# Patient Record
Sex: FEMALE | Race: WHITE | NOT HISPANIC OR LATINO | Employment: FULL TIME | URBAN - METROPOLITAN AREA
[De-identification: names, ages, dates, MRNs, and addresses within clinical notes are randomized per-mention and may not be internally consistent; named-entity substitution may affect disease eponyms.]

---

## 2017-08-17 RX ORDER — ESTRADIOL AND NORETHINDRONE ACETATE 1; .5 MG/1; MG/1
TABLET ORAL
Qty: 84 TABLET | Refills: 0 | Status: SHIPPED | OUTPATIENT
Start: 2017-08-17 | End: 2017-08-23 | Stop reason: SDUPTHER

## 2017-08-17 NOTE — TELEPHONE ENCOUNTER
----- Message from Rudy Ruff sent at 8/17/2017  9:56 AM CDT -----  Contact: pt  x_ 1st Request  _ 2nd Request  _ 3rd Request    Who: pt    Why: requesting hormone RX to be refilled and called in to her nearest [facility]: [phone] and is needing a g order     What Number to Call Back: [phone]     When to Expect a call back: (Before the end of the day)  -- if call after 3:00 call back will be tomorrow.

## 2017-08-23 ENCOUNTER — HOSPITAL ENCOUNTER (OUTPATIENT)
Dept: RADIOLOGY | Facility: HOSPITAL | Age: 57
Discharge: HOME | End: 2017-08-23
Attending: OBSTETRICS & GYNECOLOGY
Payer: COMMERCIAL

## 2017-08-23 ENCOUNTER — OFFICE VISIT (OUTPATIENT)
Dept: OBSTETRICS AND GYNECOLOGY | Facility: CLINIC | Age: 57
End: 2017-08-23
Attending: OBSTETRICS & GYNECOLOGY
Payer: COMMERCIAL

## 2017-08-23 VITALS
DIASTOLIC BLOOD PRESSURE: 72 MMHG | BODY MASS INDEX: 33.79 KG/M2 | SYSTOLIC BLOOD PRESSURE: 120 MMHG | WEIGHT: 183.63 LBS | HEIGHT: 62 IN

## 2017-08-23 DIAGNOSIS — Z01.419 WELL WOMAN EXAM WITH ROUTINE GYNECOLOGICAL EXAM: ICD-10-CM

## 2017-08-23 DIAGNOSIS — Z01.419 WELL WOMAN EXAM WITH ROUTINE GYNECOLOGICAL EXAM: Primary | ICD-10-CM

## 2017-08-23 DIAGNOSIS — Z79.890 HORMONE REPLACEMENT THERAPY: ICD-10-CM

## 2017-08-23 DIAGNOSIS — Z12.31 VISIT FOR SCREENING MAMMOGRAM: ICD-10-CM

## 2017-08-23 PROCEDURE — 77067 SCR MAMMO BI INCL CAD: CPT | Mod: TC

## 2017-08-23 PROCEDURE — 99396 PREV VISIT EST AGE 40-64: CPT | Mod: S$GLB,,, | Performed by: OBSTETRICS & GYNECOLOGY

## 2017-08-23 PROCEDURE — 88175 CYTOPATH C/V AUTO FLUID REDO: CPT

## 2017-08-23 PROCEDURE — 77063 BREAST TOMOSYNTHESIS BI: CPT | Mod: 26,,, | Performed by: RADIOLOGY

## 2017-08-23 PROCEDURE — 99999 PR PBB SHADOW E&M-EST. PATIENT-LVL III: CPT | Mod: PBBFAC,,, | Performed by: OBSTETRICS & GYNECOLOGY

## 2017-08-23 PROCEDURE — 77067 SCR MAMMO BI INCL CAD: CPT | Mod: 26,,, | Performed by: RADIOLOGY

## 2017-08-23 RX ORDER — ESTRADIOL AND NORETHINDRONE ACETATE 1; .5 MG/1; MG/1
TABLET ORAL
Qty: 84 TABLET | Refills: 4 | Status: SHIPPED | OUTPATIENT
Start: 2017-08-23 | End: 2019-03-21 | Stop reason: ALTCHOICE

## 2017-08-23 NOTE — PROGRESS NOTES
SUBJECTIVE:   57 y.o. female   for annual routine Pap and checkup. No LMP recorded. Patient is postmenopausal..      History reviewed. No pertinent past medical history.  Past Surgical History:   Procedure Laterality Date     SECTION      x2     Social History     Social History    Marital status:      Spouse name: N/A    Number of children: N/A    Years of education: N/A     Occupational History    Not on file.     Social History Main Topics    Smoking status: Former Smoker     Quit date: 2013    Smokeless tobacco: Never Used    Alcohol use Yes      Comment: occasionally    Drug use: No    Sexual activity: Not Currently     Other Topics Concern    Are You Pregnant Or Think You May Be? No     Social History Narrative    No narrative on file     Family History   Problem Relation Age of Onset    Breast cancer Paternal Aunt     Melanoma Neg Hx      OB History    Para Term  AB Living   2 2       2   SAB TAB Ectopic Multiple Live Births                  # Outcome Date GA Lbr Andreas/2nd Weight Sex Delivery Anes PTL Lv   2 Para      CS-Unspec  N    1 Para      CS-Unspec  N           Current Outpatient Prescriptions   Medication Sig Dispense Refill    AMPHETAMINE SALT COMBO 20 MG tablet   0    estradiol-norethindrone (ACTIVELLA) 1-0.5 mg per tablet Take 1 tablet by mouth  daily 84 tablet 4    hydrochlorothiazide (HYDRODIURIL) 25 MG tablet       lovastatin (MEVACOR) 10 MG tablet       MULTIVITAMIN ORAL Take by mouth.      tretinoin (RETIN-A) 0.025 % cream Apply topically nightly. 45 g 3     No current facility-administered medications for this visit.      Allergies: Review of patient's allergies indicates no known allergies.     CHIEF COMPLAINT: She has no unusual complaints.   Annual visit Yes      ROS:  Constitutional: no weight loss, weight gain, fever, fatigue  Eyes:  No vision changes, glasses/contacts  ENT/Mouth: No ulcers, sinus problems, ears ringing,  "headache  Cardiovascular: No inability to lie flat, chest pain, exercise intolerance, swelling, heart palpitations  Respiratory: No wheezing, coughing blood, shortness of breath, or cough  Gastrointestinal: No diarrhea, bloody stool, nausea/vomiting, constipation, gas, hemorrhoids  Genitourinary: No blood in urine, painful urination, urgency of urination, frequency of urination, incomplete emptying, incontinence, abnormal bleeding, painful periods, heavy periods, vaginal discharge, vaginal odor, painful intercourse, sexual problems, bleeding after intercourse.  Musculoskeletal: No muscle weakness  Skin/Breast: No painful breasts, nipple discharge, masses, rash, ulcers  Neurological: No passing out, seizures, numbness, headache  Endocrine: No diabetes, hypothyroid, hyperthyroid, hot flashes, hair loss, abnormal hair growth, ance  Psychiatric: No depression, crying  Hematologic: No bruises, bleeding, swollen lymph nodes, anemia.      OBJECTIVE:   The patient appears well, alert, oriented x 3, in no distress.  /72   Ht 5' 2" (1.575 m)   Wt 83.3 kg (183 lb 10.3 oz)   BMI 33.59 kg/m²   NECK: no thyromegaly, trachea midline  SKIN: no acne, striae, hirsutism  BREAST EXAM: breasts appear normal, no suspicious masses, no skin or nipple changes or axillary nodes  ABDOMEN: no hernias, masses, or hepatosplenomegaly  GENITALIA: normal external genitalia, no erythema, no discharge  URETHRA: normal urethra, normal urethral meatus  VAGINA: Normal  CERVIX: no lesions or cervical motion tenderness  UTERUS: normal size, contour, position, consistency, mobility, non-tender  ADNEXA: no mass, fullness, tenderness      ASSESSMENT:   1. Well woman exam with routine gynecological exam    2. Hormone replacement therapy        PLAN:   mammogram  pap smear  return annually or prn  refilled hrt   "

## 2017-08-24 ENCOUNTER — PATIENT MESSAGE (OUTPATIENT)
Dept: OBSTETRICS AND GYNECOLOGY | Facility: CLINIC | Age: 57
End: 2017-08-24

## 2017-08-27 ENCOUNTER — PATIENT MESSAGE (OUTPATIENT)
Dept: OBSTETRICS AND GYNECOLOGY | Facility: CLINIC | Age: 57
End: 2017-08-27

## 2018-09-04 ENCOUNTER — PATIENT MESSAGE (OUTPATIENT)
Dept: OBSTETRICS AND GYNECOLOGY | Facility: CLINIC | Age: 58
End: 2018-09-04

## 2018-09-26 ENCOUNTER — OFFICE VISIT (OUTPATIENT)
Dept: OBSTETRICS AND GYNECOLOGY | Facility: CLINIC | Age: 58
End: 2018-09-26
Payer: COMMERCIAL

## 2018-09-26 ENCOUNTER — HOSPITAL ENCOUNTER (OUTPATIENT)
Dept: RADIOLOGY | Facility: HOSPITAL | Age: 58
Discharge: HOME | End: 2018-09-26
Attending: OBSTETRICS & GYNECOLOGY
Payer: COMMERCIAL

## 2018-09-26 VITALS
DIASTOLIC BLOOD PRESSURE: 80 MMHG | WEIGHT: 185.88 LBS | HEIGHT: 62 IN | SYSTOLIC BLOOD PRESSURE: 132 MMHG | BODY MASS INDEX: 34.2 KG/M2

## 2018-09-26 DIAGNOSIS — Z79.890 HORMONE REPLACEMENT THERAPY (HRT): ICD-10-CM

## 2018-09-26 DIAGNOSIS — R53.83 FATIGUE, UNSPECIFIED TYPE: ICD-10-CM

## 2018-09-26 DIAGNOSIS — Z12.31 VISIT FOR SCREENING MAMMOGRAM: ICD-10-CM

## 2018-09-26 DIAGNOSIS — Z78.0 MENOPAUSE: ICD-10-CM

## 2018-09-26 DIAGNOSIS — Z01.419 WELL WOMAN EXAM WITH ROUTINE GYNECOLOGICAL EXAM: Primary | ICD-10-CM

## 2018-09-26 DIAGNOSIS — Z12.31 SCREENING MAMMOGRAM, ENCOUNTER FOR: ICD-10-CM

## 2018-09-26 DIAGNOSIS — Z12.31 VISIT FOR SCREENING MAMMOGRAM: Primary | ICD-10-CM

## 2018-09-26 DIAGNOSIS — R32 URINARY INCONTINENCE, UNSPECIFIED TYPE: ICD-10-CM

## 2018-09-26 PROCEDURE — 99396 PREV VISIT EST AGE 40-64: CPT | Mod: S$GLB,,, | Performed by: OBSTETRICS & GYNECOLOGY

## 2018-09-26 PROCEDURE — 77063 BREAST TOMOSYNTHESIS BI: CPT | Mod: 26,,, | Performed by: RADIOLOGY

## 2018-09-26 PROCEDURE — 77067 SCR MAMMO BI INCL CAD: CPT | Mod: TC

## 2018-09-26 PROCEDURE — 99999 PR PBB SHADOW E&M-EST. PATIENT-LVL III: CPT | Mod: PBBFAC,,, | Performed by: OBSTETRICS & GYNECOLOGY

## 2018-09-26 PROCEDURE — 77067 SCR MAMMO BI INCL CAD: CPT | Mod: 26,,, | Performed by: RADIOLOGY

## 2018-09-26 RX ORDER — ESTERIFIED ESTROGEN AND METHYLTESTOSTERONE 1.25; 2.5 MG/1; MG/1
1 TABLET ORAL DAILY
Qty: 30 TABLET | Refills: 5 | Status: SHIPPED | OUTPATIENT
Start: 2018-09-26 | End: 2019-02-05

## 2018-09-26 NOTE — PROGRESS NOTES
Subjective:       Patient ID: Rogelio Davila is a 58 y.o. female.    Chief Complaint:  Well Woman      History of Present Illness  HPI  This 58 yr old P4 female is here for routine gyn exam.  She is a patient of Dr Adamson and had normal pap and mammogram with low risk last yr.  She is taking activella and still having foggy thinking , hair loss , weight gain, bladder changes where she cannot hold it sometimes.  Inflammation all over and is on fluid pill every other day.  Most important is hair loss and general sense of being.  She is sleeping and no hot flashes.  She is not currently sexually active but paps are painful.  Pt is here for routine exam but wants to optimize her HRT.  We had a long discussion on risks and benefits and pt understands  GYN & OB History  No LMP recorded. Patient is postmenopausal.   Date of Last Pap: 2017    OB History    Para Term  AB Living   4 4 2     2   SAB TAB Ectopic Multiple Live Births                  # Outcome Date GA Lbr Andreas/2nd Weight Sex Delivery Anes PTL Lv   4 Term            3 Term            2 Para      CS-Unspec  N    1 Para      CS-Unspec  N           Review of Systems  Review of Systems   Constitutional: Positive for fatigue and unexpected weight change. Negative for chills and fever.   Respiratory: Negative for shortness of breath.    Cardiovascular: Negative for chest pain.   Gastrointestinal: Negative for abdominal pain, nausea and vomiting.   Genitourinary: Negative for difficulty urinating, dyspareunia, genital sores, menstrual problem, pelvic pain, vaginal bleeding, vaginal discharge and vaginal pain.   Skin: Negative for wound.   Hematological: Negative for adenopathy.           Objective:   Physical Exam:   Constitutional: She is oriented to person, place, and time. She appears well-developed and well-nourished.    HENT:   Head: Normocephalic.    Eyes: EOM are normal.    Neck: Normal range of motion.    Cardiovascular: Normal rate.      Pulmonary/Chest: Effort normal. She exhibits no mass and no tenderness. Right breast exhibits no inverted nipple, no mass, no skin change and no tenderness. Left breast exhibits no inverted nipple, no mass, no skin change and no tenderness.        Abdominal: Soft. She exhibits no distension. There is no tenderness.     Genitourinary: Vagina normal and uterus normal. There is no rash, tenderness or lesion on the right labia. There is no rash, tenderness or lesion on the left labia. Uterus is not tender. Cervix is normal. Right adnexum displays no mass, no tenderness and no fullness. Left adnexum displays no mass, no tenderness and no fullness. Cervix exhibits no discharge.           Musculoskeletal: Normal range of motion.       Neurological: She is alert and oriented to person, place, and time.    Skin: Skin is warm and dry.    Psychiatric: She has a normal mood and affect.          Assessment:        1. Well woman exam with routine gynecological exam    2. Menopause    3. Fatigue, unspecified type    4. Urinary incontinence, unspecified type    5. Screening mammogram, encounter for    6. Hormone replacement therapy (HRT)               Plan:       Will start with estratest fs and prometrium from patio 200 mg  Labs today to see where she is on current HRT  Refer to urogyn  Follow up in 3 months  Pap every 3 yrs  mammogrma yrly

## 2018-10-01 ENCOUNTER — PATIENT MESSAGE (OUTPATIENT)
Dept: OBSTETRICS AND GYNECOLOGY | Facility: CLINIC | Age: 58
End: 2018-10-01

## 2018-10-01 DIAGNOSIS — Z78.0 MENOPAUSE: ICD-10-CM

## 2018-10-02 ENCOUNTER — TELEPHONE (OUTPATIENT)
Dept: RADIOLOGY | Facility: HOSPITAL | Age: 58
End: 2018-10-02

## 2018-10-03 ENCOUNTER — HOSPITAL ENCOUNTER (OUTPATIENT)
Dept: RADIOLOGY | Facility: HOSPITAL | Age: 58
Discharge: HOME | End: 2018-10-03
Attending: OBSTETRICS & GYNECOLOGY
Payer: COMMERCIAL

## 2018-10-03 DIAGNOSIS — R92.8 ABNORMAL MAMMOGRAM: ICD-10-CM

## 2018-10-03 PROCEDURE — 77065 DX MAMMO INCL CAD UNI: CPT | Mod: TC,PO

## 2018-10-03 PROCEDURE — 77065 DX MAMMO INCL CAD UNI: CPT | Mod: 26,,, | Performed by: RADIOLOGY

## 2018-10-03 PROCEDURE — 76642 ULTRASOUND BREAST LIMITED: CPT | Mod: 26,RT,, | Performed by: RADIOLOGY

## 2018-10-03 PROCEDURE — 77061 BREAST TOMOSYNTHESIS UNI: CPT | Mod: TC,PO

## 2018-10-03 PROCEDURE — 76642 ULTRASOUND BREAST LIMITED: CPT | Mod: TC,PO,RT

## 2018-10-03 PROCEDURE — 77061 BREAST TOMOSYNTHESIS UNI: CPT | Mod: 26,,, | Performed by: RADIOLOGY

## 2018-10-10 ENCOUNTER — PATIENT MESSAGE (OUTPATIENT)
Dept: OBSTETRICS AND GYNECOLOGY | Facility: CLINIC | Age: 58
End: 2018-10-10

## 2019-02-01 ENCOUNTER — PATIENT MESSAGE (OUTPATIENT)
Dept: OBSTETRICS AND GYNECOLOGY | Facility: CLINIC | Age: 59
End: 2019-02-01

## 2019-02-01 DIAGNOSIS — Z78.0 MENOPAUSE: Primary | ICD-10-CM

## 2019-02-05 ENCOUNTER — PATIENT MESSAGE (OUTPATIENT)
Dept: OBSTETRICS AND GYNECOLOGY | Facility: CLINIC | Age: 59
End: 2019-02-05

## 2019-02-05 RX ORDER — ESTERIFIED ESTROGEN AND METHYLTESTOSTERONE .625; 1.25 MG/1; MG/1
1 TABLET ORAL DAILY
Qty: 30 TABLET | Refills: 5 | Status: SHIPPED | OUTPATIENT
Start: 2019-02-05 | End: 2019-08-13 | Stop reason: SDUPTHER

## 2019-02-13 ENCOUNTER — PATIENT MESSAGE (OUTPATIENT)
Dept: OBSTETRICS AND GYNECOLOGY | Facility: CLINIC | Age: 59
End: 2019-02-13

## 2019-02-18 DIAGNOSIS — Z78.0 MENOPAUSE: ICD-10-CM

## 2019-03-21 ENCOUNTER — OFFICE VISIT (OUTPATIENT)
Dept: OBSTETRICS AND GYNECOLOGY | Facility: CLINIC | Age: 59
End: 2019-03-21
Attending: OBSTETRICS & GYNECOLOGY
Payer: COMMERCIAL

## 2019-03-21 VITALS
SYSTOLIC BLOOD PRESSURE: 116 MMHG | HEIGHT: 62 IN | WEIGHT: 195.13 LBS | BODY MASS INDEX: 35.91 KG/M2 | DIASTOLIC BLOOD PRESSURE: 72 MMHG

## 2019-03-21 DIAGNOSIS — Z79.890 HORMONE REPLACEMENT THERAPY (HRT): ICD-10-CM

## 2019-03-21 DIAGNOSIS — Z78.0 MENOPAUSE: Primary | ICD-10-CM

## 2019-03-21 PROCEDURE — 99213 OFFICE O/P EST LOW 20 MIN: CPT | Mod: S$GLB,,, | Performed by: OBSTETRICS & GYNECOLOGY

## 2019-03-21 PROCEDURE — 3008F BODY MASS INDEX DOCD: CPT | Mod: CPTII,S$GLB,, | Performed by: OBSTETRICS & GYNECOLOGY

## 2019-03-21 PROCEDURE — 3008F PR BODY MASS INDEX (BMI) DOCUMENTED: ICD-10-PCS | Mod: CPTII,S$GLB,, | Performed by: OBSTETRICS & GYNECOLOGY

## 2019-03-21 PROCEDURE — 99213 PR OFFICE/OUTPT VISIT, EST, LEVL III, 20-29 MIN: ICD-10-PCS | Mod: S$GLB,,, | Performed by: OBSTETRICS & GYNECOLOGY

## 2019-03-21 NOTE — PROGRESS NOTES
Subjective:       Patient ID: Rogelio Davila is a 58 y.o. female.    Chief Complaint:  No chief complaint on file.      History of Present Illness  HPI  This 58 yr old P4 female is here to follow up changing from activella to estratest HS and prometrium 200 from patio.  She started this in Sept. She felt great for some time then had depression with holidays as usual and usually does come out of this on her own but did not.  Was groggy and depressed longer so we changed to estratest HS and decreased the dose of progesterone to 100.   She had ultrasound after her mammogram and back to yearly mammograms.  Pap normal 2017 with Dr Tirado.  She has gained about 10 lbs with the depression and holidays.   She is not taking any meds for depression.   and sister  16 yrs ago and comes and goes but usually can snap out of it.    She walks 30 min at work and we discussed and will start back with yoga and walking.  Has access to gym  Hair still thinning but just like her grandmother.    GYN & OB History  No LMP recorded. Patient is postmenopausal.   Date of Last Pap: 2017    OB History    Para Term  AB Living   4 4 2     2   SAB TAB Ectopic Multiple Live Births                  # Outcome Date GA Lbr Andreas/2nd Weight Sex Delivery Anes PTL Lv   4 Term            3 Term            2 Para      CS-Unspec  N    1 Para      CS-Unspec  N           Review of Systems  Review of Systems   Constitutional: Negative for chills and fever.   Respiratory: Negative for shortness of breath.    Cardiovascular: Negative for chest pain.   Gastrointestinal: Negative for abdominal pain, nausea and vomiting.   Genitourinary: Negative for difficulty urinating, dyspareunia, genital sores, menstrual problem, pelvic pain, vaginal bleeding, vaginal discharge and vaginal pain.   Skin: Negative for wound.   Hematological: Negative for adenopathy.           Objective:   Physical Exam       Assessment:        1. Menopause    2.  Hormone replacement therapy (HRT)               Plan:      As above try exercise and diet and nioxin products for hair  Will continue the above but pt may use the full strength and can go up if desires but keep the progesterone the same.

## 2019-03-28 ENCOUNTER — OFFICE VISIT (OUTPATIENT)
Dept: DERMATOLOGY | Facility: CLINIC | Age: 59
End: 2019-03-28
Payer: COMMERCIAL

## 2019-03-28 DIAGNOSIS — D22.9 MULTIPLE BENIGN NEVI: Primary | ICD-10-CM

## 2019-03-28 DIAGNOSIS — Z12.83 SKIN CANCER SCREENING: ICD-10-CM

## 2019-03-28 DIAGNOSIS — D18.00 ANGIOMA: ICD-10-CM

## 2019-03-28 DIAGNOSIS — L91.8 CUTANEOUS SKIN TAGS: ICD-10-CM

## 2019-03-28 DIAGNOSIS — L65.9 HAIR LOSS: ICD-10-CM

## 2019-03-28 DIAGNOSIS — L82.1 SEBORRHEIC KERATOSIS: ICD-10-CM

## 2019-03-28 PROCEDURE — 99202 PR OFFICE/OUTPT VISIT, NEW, LEVL II, 15-29 MIN: ICD-10-PCS | Mod: S$GLB,,, | Performed by: NURSE PRACTITIONER

## 2019-03-28 PROCEDURE — 99202 OFFICE O/P NEW SF 15 MIN: CPT | Mod: S$GLB,,, | Performed by: NURSE PRACTITIONER

## 2019-03-28 PROCEDURE — 99999 PR PBB SHADOW E&M-EST. PATIENT-LVL II: CPT | Mod: PBBFAC,,, | Performed by: NURSE PRACTITIONER

## 2019-03-28 PROCEDURE — 99999 PR PBB SHADOW E&M-EST. PATIENT-LVL II: ICD-10-PCS | Mod: PBBFAC,,, | Performed by: NURSE PRACTITIONER

## 2019-03-28 NOTE — PROGRESS NOTES
"  Subjective:       Patient ID:  Rogelio Davila is a 58 y.o. female who presents for   Chief Complaint   Patient presents with    Skin Check     UBSE      Patient is here today for a "mole" check.   Pt has a history of  moderate sun exposure in the past.   Pt recalls several blistering sunburns in the past- "plenty"  Pt has history of tanning bed use- couple weeks at a time for 2 josé  Pt has  had moles removed in the past- no  Pt has history of melanoma in first degree relatives-  No        Review of Systems   Constitutional: Negative for fever, chills and fatigue.   Skin: Positive for daily sunscreen use and activity-related sunscreen use.        Objective:    Physical Exam   Constitutional: She appears well-developed and well-nourished. No distress.   Neurological: She is alert and oriented to person, place, and time. She is not disoriented.   Psychiatric: She has a normal mood and affect.   Skin:   Areas Examined (abnormalities noted in diagram):   Head / Face Inspection Performed  Neck Inspection Performed  Chest / Axilla Inspection Performed  Back Inspection Performed  RUE Inspected  LUE Inspection Performed                   Diagram Legend     Erythematous scaling macule/papule c/w actinic keratosis       Vascular papule c/w angioma      Pigmented verrucoid papule/plaque c/w seborrheic keratosis      Yellow umbilicated papule c/w sebaceous hyperplasia      Irregularly shaped tan macule c/w lentigo     1-2 mm smooth white papules consistent with Milia      Movable subcutaneous cyst with punctum c/w epidermal inclusion cyst      Subcutaneous movable cyst c/w pilar cyst      Firm pink to brown papule c/w dermatofibroma      Pedunculated fleshy papule(s) c/w skin tag(s)      Evenly pigmented macule c/w junctional nevus     Mildly variegated pigmented, slightly irregular-bordered macule c/w mildly atypical nevus      Flesh colored to evenly pigmented papule c/w intradermal nevus       Pink pearly " papule/plaque c/w basal cell carcinoma      Erythematous hyperkeratotic cursted plaque c/w SCC      Surgical scar with no sign of skin cancer recurrence      Open and closed comedones      Inflammatory papules and pustules      Verrucoid papule consistent consistent with wart     Erythematous eczematous patches and plaques     Dystrophic onycholytic nail with subungual debris c/w onychomycosis     Umbilicated papule    Erythematous-base heme-crusted tan verrucoid plaque consistent with inflamed seborrheic keratosis     Erythematous Silvery Scaling Plaque c/w Psoriasis     See annotation      Assessment / Plan:        Multiple benign nevi  Upper body skin examination performed today including at least 6 points as noted in physical examination. No lesions suspicious for malignancy noted.    Seborrheic keratosis  These are benign inherited growths without a malignant potential. Reassurance given to patient. No treatment is necessary.     Angioma  This is a benign vascular lesion. Reassurance given. No treatment required.     Cutaneous skin tags  These are benign inherited growths without a malignant potential. Reassurance given to patient. No treatment is necessary.     Hair loss  Encouraged hair supplement daily  Encouraged Rogaine 5% foam to affected area 1x per day. Must use consistently and if you stop using, the hair it stimulated to grow may fall out over time. Generic acceptable.       Skin cancer screening  Discussed ABCDE's of nevi.  Monitor for new mole or moles that are becoming bigger, darker, irritated, or developing irregular borders. Brochure provided.             Follow up in about 1 year (around 3/28/2020).

## 2019-08-13 DIAGNOSIS — Z78.0 MENOPAUSE: ICD-10-CM

## 2019-08-13 NOTE — TELEPHONE ENCOUNTER
----- Message from Lisa Horton sent at 8/13/2019  4:17 PM CDT -----  Contact: MANJIT LEMON   Can the clinic reply in [facility]:     Please refill the medication(s) listed below. The patient can be reached at this phone number once it is called into the pharmacy. [phone]    Medication #1 estrogens,conjugated,-methyltestosterone 0.625-1.25mg (ESTRATEST HS) 0.625-1.25 mg per tablet    Medication #2    Preferred Pharmacy: [id number] [address] [place] [address] [place] AT [facility]

## 2019-08-14 RX ORDER — ESTERIFIED ESTROGEN AND METHYLTESTOSTERONE .625; 1.25 MG/1; MG/1
1 TABLET ORAL DAILY
Qty: 30 TABLET | Refills: 5 | Status: SHIPPED | OUTPATIENT
Start: 2019-08-14 | End: 2020-11-30 | Stop reason: SDUPTHER

## 2020-06-10 ENCOUNTER — LAB VISIT (OUTPATIENT)
Dept: PRIMARY CARE CLINIC | Facility: OTHER | Age: 60
End: 2020-06-10
Payer: COMMERCIAL

## 2020-06-10 DIAGNOSIS — Z11.59 SPECIAL SCREENING EXAMINATION FOR UNSPECIFIED VIRAL DISEASE: ICD-10-CM

## 2020-06-10 PROCEDURE — U0003 INFECTIOUS AGENT DETECTION BY NUCLEIC ACID (DNA OR RNA); SEVERE ACUTE RESPIRATORY SYNDROME CORONAVIRUS 2 (SARS-COV-2) (CORONAVIRUS DISEASE [COVID-19]), AMPLIFIED PROBE TECHNIQUE, MAKING USE OF HIGH THROUGHPUT TECHNOLOGIES AS DESCRIBED BY CMS-2020-01-R: HCPCS

## 2020-06-12 LAB — SARS-COV-2 RNA RESP QL NAA+PROBE: NOT DETECTED

## 2020-09-09 ENCOUNTER — OFFICE VISIT (OUTPATIENT)
Dept: DERMATOLOGY | Facility: CLINIC | Age: 60
End: 2020-09-09
Payer: COMMERCIAL

## 2020-09-09 DIAGNOSIS — D18.01 CHERRY ANGIOMA: ICD-10-CM

## 2020-09-09 DIAGNOSIS — D23.9 DERMATOFIBROMA: ICD-10-CM

## 2020-09-09 DIAGNOSIS — L98.8 RHYTIDES: ICD-10-CM

## 2020-09-09 DIAGNOSIS — L81.4 LENTIGINES: ICD-10-CM

## 2020-09-09 DIAGNOSIS — D22.9 MULTIPLE BENIGN NEVI: ICD-10-CM

## 2020-09-09 DIAGNOSIS — Z12.83 SKIN CANCER SCREENING: ICD-10-CM

## 2020-09-09 DIAGNOSIS — L73.8 SEBACEOUS HYPERPLASIA: ICD-10-CM

## 2020-09-09 DIAGNOSIS — L82.1 SK (SEBORRHEIC KERATOSIS): Primary | ICD-10-CM

## 2020-09-09 PROCEDURE — 99999 PR PBB SHADOW E&M-EST. PATIENT-LVL III: ICD-10-PCS | Mod: PBBFAC,,, | Performed by: DERMATOLOGY

## 2020-09-09 PROCEDURE — 99214 PR OFFICE/OUTPT VISIT, EST, LEVL IV, 30-39 MIN: ICD-10-PCS | Mod: S$GLB,,, | Performed by: DERMATOLOGY

## 2020-09-09 PROCEDURE — 99214 OFFICE O/P EST MOD 30 MIN: CPT | Mod: S$GLB,,, | Performed by: DERMATOLOGY

## 2020-09-09 PROCEDURE — 99999 PR PBB SHADOW E&M-EST. PATIENT-LVL III: CPT | Mod: PBBFAC,,, | Performed by: DERMATOLOGY

## 2020-09-09 RX ORDER — TRETINOIN 0.25 MG/G
CREAM TOPICAL NIGHTLY
Qty: 20 G | Refills: 3 | Status: SHIPPED | OUTPATIENT
Start: 2020-09-09 | End: 2021-02-25

## 2020-09-09 NOTE — PATIENT INSTRUCTIONS

## 2020-09-09 NOTE — PROGRESS NOTES
Subjective:       Patient ID:  Rogelio Davila is a 60 y.o. female who presents for   Chief Complaint   Patient presents with    Mole     HPI   Pt presents today for a mole check. Has 2 moles that lie on her bra line that are bothersome. Present x yrs, no prior tx.  No personal history of skin cancer or atypical moles. Would like something to use for bumps and wrinkles on face.  Has had many blistering sunburns.    Review of Systems   Constitutional: Negative for fever, chills, weight loss, weight gain, fatigue, night sweats and malaise.   Respiratory: Negative for cough and shortness of breath.    Skin: Positive for daily sunscreen use. Negative for itching, rash and wears hat.   Hematologic/Lymphatic: Does not bruise/bleed easily.        Objective:    Physical Exam   Constitutional: She appears well-developed and well-nourished. No distress.   Neurological: She is alert and oriented to person, place, and time. She is not disoriented.   Psychiatric: She has a normal mood and affect. She is not agitated.   Skin:   Areas Examined (abnormalities noted in diagram):   Scalp / Hair Palpated and Inspected  Head / Face Inspection Performed  Neck Inspection Performed  Chest / Axilla Inspection Performed  Abdomen Inspection Performed  Genitals / Buttocks / Groin Inspection Performed  Back Inspection Performed  RUE Inspected  LUE Inspection Performed  RLE Inspected  LLE Inspection Performed  Nails and Digits Inspection Performed                   Diagram Legend     Erythematous scaling macule/papule c/w actinic keratosis       Vascular papule c/w angioma      Pigmented verrucoid papule/plaque c/w seborrheic keratosis      Yellow umbilicated papule c/w sebaceous hyperplasia      Irregularly shaped tan macule c/w lentigo     1-2 mm smooth white papules consistent with Milia      Movable subcutaneous cyst with punctum c/w epidermal inclusion cyst      Subcutaneous movable cyst c/w pilar cyst      Firm pink to brown papule  c/w dermatofibroma      Pedunculated fleshy papule(s) c/w skin tag(s)      Evenly pigmented macule c/w junctional nevus     Mildly variegated pigmented, slightly irregular-bordered macule c/w mildly atypical nevus      Flesh colored to evenly pigmented papule c/w intradermal nevus       Pink pearly papule/plaque c/w basal cell carcinoma      Erythematous hyperkeratotic cursted plaque c/w SCC      Surgical scar with no sign of skin cancer recurrence      Open and closed comedones      Inflammatory papules and pustules      Verrucoid papule consistent consistent with wart     Erythematous eczematous patches and plaques     Dystrophic onycholytic nail with subungual debris c/w onychomycosis     Umbilicated papule    Erythematous-base heme-crusted tan verrucoid plaque consistent with inflamed seborrheic keratosis     Erythematous Silvery Scaling Plaque c/w Psoriasis     See annotation      Assessment / Plan:        SK (seborrheic keratosis)  These are benign inherited growths without a malignant potential. Reassurance given to patient. No treatment is necessary.   Treatment of benign, asymptomatic lesions may be considered cosmetic.  Warned about risk of hypo- or hyperpigmentation with treatment and risk of recurrence.    Cherry angioma  This is a benign vascular lesion. Reassurance given. No treatment required. Treatment of benign, asymptomatic lesions may be considered cosmetic.    Multiple benign nevi  Benign-appearing on exam today. Counseled pt to monitor mole(s) and return to clinic if any changes noted or symptoms (bleeding, itching, pain, etc) noted. Brochure provided.    Sebaceous hyperplasia  -     tretinoin (RETIN-A) 0.025 % cream; Apply topically nightly. Wash off in am and apply sunscreen.  Dispense: 20 g; Refill: 3    Lentigines  These are benign sun spots which should be monitored for changes. Patient instructed in importance of daily broad spectrum sunscreen use with spf at least 30. Sun avoidance and  topical protection/protective clothing discussed.    Rhytides  -     tretinoin (RETIN-A) 0.025 % cream; Apply topically nightly. Wash off in am and apply sunscreen.  Dispense: 20 g; Refill: 3  Counseled pt that the retinoid may make the skin dry, red, and irritated. May moisturize daily with a non-comedogenic moisturizer. If still dry, would recommend using the retinoid every other night or less frequently as tolerated. Avoid using around corners of eyes, nose, and mouth.  Patient instructed in importance of daily broad spectrum sunscreen use with spf at least 30. Sun avoidance and topical protection/protective clothing discussed.    Dermatofibroma  Reassurance given to patient. No treatment is necessary.  This is benign scar tissue from previous minor trauma to the skin such as a bug bite.    Skin cancer screening  Total body skin examination performed today including at least 12 points as noted in physical examination. No lesions suspicious for malignancy noted.  Patient instructed in importance of daily broad spectrum sunscreen use with spf at least 30. Sun avoidance and topical protection/protective clothing discussed.    Follow up in about 1 year (around 9/9/2021) for skin check or sooner for any concerns.

## 2020-11-11 ENCOUNTER — TELEPHONE (OUTPATIENT)
Dept: OBSTETRICS AND GYNECOLOGY | Facility: CLINIC | Age: 60
End: 2020-11-11

## 2020-11-11 DIAGNOSIS — Z12.31 VISIT FOR SCREENING MAMMOGRAM: Primary | ICD-10-CM

## 2020-11-19 ENCOUNTER — HOSPITAL ENCOUNTER (OUTPATIENT)
Dept: RADIOLOGY | Facility: HOSPITAL | Age: 60
Discharge: HOME | End: 2020-11-19
Attending: OBSTETRICS & GYNECOLOGY
Payer: COMMERCIAL

## 2020-11-19 DIAGNOSIS — Z12.31 VISIT FOR SCREENING MAMMOGRAM: ICD-10-CM

## 2020-11-19 PROCEDURE — 77067 MAMMO DIGITAL SCREENING BILAT WITH TOMO: ICD-10-PCS | Mod: 26,,, | Performed by: RADIOLOGY

## 2020-11-19 PROCEDURE — 77063 MAMMO DIGITAL SCREENING BILAT WITH TOMO: ICD-10-PCS | Mod: 26,,, | Performed by: RADIOLOGY

## 2020-11-19 PROCEDURE — 77067 SCR MAMMO BI INCL CAD: CPT | Mod: TC,PO

## 2020-11-19 PROCEDURE — 77067 SCR MAMMO BI INCL CAD: CPT | Mod: 26,,, | Performed by: RADIOLOGY

## 2020-11-19 PROCEDURE — 77063 BREAST TOMOSYNTHESIS BI: CPT | Mod: 26,,, | Performed by: RADIOLOGY

## 2020-11-24 ENCOUNTER — OFFICE VISIT (OUTPATIENT)
Dept: OBSTETRICS AND GYNECOLOGY | Facility: CLINIC | Age: 60
End: 2020-11-24
Attending: OBSTETRICS & GYNECOLOGY
Payer: COMMERCIAL

## 2020-11-24 VITALS
HEIGHT: 67 IN | SYSTOLIC BLOOD PRESSURE: 138 MMHG | BODY MASS INDEX: 30 KG/M2 | WEIGHT: 191.13 LBS | DIASTOLIC BLOOD PRESSURE: 78 MMHG

## 2020-11-24 DIAGNOSIS — N95.1 MENOPAUSAL SYNDROME: Primary | ICD-10-CM

## 2020-11-24 PROCEDURE — 99215 OFFICE O/P EST HI 40 MIN: CPT | Mod: S$GLB,,, | Performed by: OBSTETRICS & GYNECOLOGY

## 2020-11-24 PROCEDURE — 1126F AMNT PAIN NOTED NONE PRSNT: CPT | Mod: S$GLB,,, | Performed by: OBSTETRICS & GYNECOLOGY

## 2020-11-24 PROCEDURE — 3008F BODY MASS INDEX DOCD: CPT | Mod: CPTII,S$GLB,, | Performed by: OBSTETRICS & GYNECOLOGY

## 2020-11-24 PROCEDURE — 1126F PR PAIN SEVERITY QUANTIFIED, NO PAIN PRESENT: ICD-10-PCS | Mod: S$GLB,,, | Performed by: OBSTETRICS & GYNECOLOGY

## 2020-11-24 PROCEDURE — 3008F PR BODY MASS INDEX (BMI) DOCUMENTED: ICD-10-PCS | Mod: CPTII,S$GLB,, | Performed by: OBSTETRICS & GYNECOLOGY

## 2020-11-24 PROCEDURE — 99215 PR OFFICE/OUTPT VISIT, EST, LEVL V, 40-54 MIN: ICD-10-PCS | Mod: S$GLB,,, | Performed by: OBSTETRICS & GYNECOLOGY

## 2020-11-24 RX ORDER — DEXTROAMPHETAMINE SULFATE, DEXTROAMPHETAMINE SACCHARATE, AMPHETAMINE SULFATE AND AMPHETAMINE ASPARTATE 3.75; 3.75; 3.75; 3.75 MG/1; MG/1; MG/1; MG/1
15 CAPSULE, EXTENDED RELEASE ORAL DAILY
COMMUNITY
Start: 2020-11-05 | End: 2023-06-05

## 2020-11-24 RX ORDER — DEXTROAMPHETAMINE SACCHARATE, AMPHETAMINE ASPARTATE, DEXTROAMPHETAMINE SULFATE AND AMPHETAMINE SULFATE 2.5; 2.5; 2.5; 2.5 MG/1; MG/1; MG/1; MG/1
10 TABLET ORAL DAILY
COMMUNITY
Start: 2020-11-05 | End: 2023-06-05

## 2020-11-24 RX ORDER — ESCITALOPRAM OXALATE 20 MG/1
20 TABLET ORAL
COMMUNITY
Start: 2020-08-20 | End: 2022-03-30

## 2020-11-24 NOTE — PATIENT INSTRUCTIONS
Endometrial Biopsy    Endometrial biopsy is a procedure used to study the endometrium (lining of the uterus). It is usually done in your health care providers office. During the biopsy, small tissue samples are taken from inside the uterus. These are then sent to a lab for study. If any problems are found, you and your health care provider will discuss treatment options. The biopsy usually takes only a few minutes, and you can often go back to your normal routine as soon as the procedure is over.  Reasons for the procedure  Endometrial biopsy may help pinpoint the cause of certain problems. These include:  · Bleeding after menopause  · Heavy or irregular menstrual periods  · Bleeding associated with hormone therapy  · Prolonged bleeding  · Abnormal Pap test results  · Having certain types of cancer  · Trouble getting pregnant (fertility problems)  What are the risks?  Problems with endometrial biopsy are rare, but can include:  · Bleeding  · Infection  · Damage to the uterine wall (very rare)  Getting ready for the procedure  Your health care provider will ask about your health and any medicines you take, like blood thinners. Before your biopsy, you may have tests to make sure youre not pregnant or have an infection. You may also be asked to sign a consent form. A day or 2 before the procedure:   · Avoid using creams or other vaginal medicines.  · Avoid douching.  · Ask your health care provider if you should take pain medicines shortly before the test.  During the biopsy  During the biopsy, you will likely experience the following:  · You will be asked to lie on an exam table with your knees bent, just as you do for a Pap test.  · You may have a brief pelvic exam. An instrument called a speculum is then inserted into the vagina to hold it open.  · An antiseptic solution may be applied to the cervix. The cervix may also be numbed with an anesthetic or dilated to widen the opening.  · A small tube is passed  through the cervix into the uterus.  · It is normal to feel some cramping when the tube is inserted. But tell your health care provider if you have severe cramping or are very uncomfortable.  · Using mild suction, samples are taken from the uterine lining. You may feel pinching or additional cramping when this is done.  · The tube and speculum are then removed and the samples are sent to a lab for study.  After the procedure  After the procedure, you may experience the following:  · If you feel lightheaded or dizzy, you can rest on the table until youre ready to get dressed.  · For a few hours, you may feel some mild cramping. This can usually be relieved with over-the-counter pain medicines.  · You may have some bleeding for a few days. Use pads instead of tampons.  · Dont douche or use any vaginal medicines unless your health care provider says its OK.  · Ask your health care provider when its OK to have sex again.  Follow-up care  It will take about a week for the biopsy results to come back from the lab. Then you and your health care provider can discuss the results. These may show that no treatment is required. Or, you may be scheduled for a follow-up appointment and more tests. If your biopsy was done for fertility problems, be sure to record the day when your next period begins.     Call your health care provider   Contact your health care provider if you have any of the following:  · Heavy bleeding (more than a pad an hour for 2 hours).  · Severe cramping or increasing pain.  · Fever over 100.4°F (38.0°C)  · Foul-smelling or unusual vaginal discharge.   Date Last Reviewed: 5/10/2015  © 1262-9503 [employer]. [address] [place] [address], [place] [address]. All rights reserved. This information is not intended as a substitute for professional medical care. Always follow your healthcare professional's instructions.

## 2020-11-24 NOTE — PROGRESS NOTES
"   Rogelio Davila is a 60 y.o. female P2 (patient of Dr. Jaeger)  who presents to discuss ongoing hormone replacement therapy.  Menarche occurred at age 11 and the patient went into menopause at 50years of age, which was 10 years ago. The patient is taking hormone replacement therapy. She has been on Estratest HS for the past 2 years, but stopped taking Progesterone after a few months because she felt that it was causing her to have bleeding, and she states the bleeding stopped about 18 months ago , shortly after stopping the prometrium.  Patient reports post-menopausal vaginal bleeding(currently stopped). The patient is not currently sexually active.(She is  and has not resumed dating).  She does report hair loss which worsened on the Testosterone, and has recently had a Telemedicine consult with a hair specialist, and it was suggested that she might need to consider use of Finasteride.   Other than her hair loss, she currently feels "pretty good".  Denies Insomnia, hot flashes or night sweats.      She denies the following contraindications to HRT:   history of VTE/PE, thrombophilia,  breast cancer, or active liver disease.       [place] [facility] [place] labs: pending  Pap smear: 2017  Mammogram: 2020  DEXA: N/A  Colonoscopy: N/A    No visits with results within 3 Month(s) from this visit.   Latest known visit with results is:   Lab Visit on 06/10/2020   Component Date Value Ref Range Status    SARS-CoV2 (COVID-19) Qualitative P* 06/10/2020 Not Detected  Not Detected Final       Past Medical History:   Diagnosis Date    Abnormal Pap smear of cervix     Hyperlipidemia      Past Surgical History:   Procedure Laterality Date     SECTION      x2    COLPOSCOPY       Social History     Tobacco Use    Smoking status: Former Smoker     Quit date: 2013     Years since quittin.4    Smokeless tobacco: Former User   Substance Use Topics    Alcohol use: Yes     Comment: " "occasionally    Drug use: No     Family History   Problem Relation Age of Onset    Breast cancer Paternal Aunt 58    Cancer Maternal Grandfather         lung    Diabetes Maternal Grandfather     Stroke Father 62    Hypertension Father     Heart attacks under age 50 Father         smoker, drinker    Hypertension Mother     Melanoma Neg Hx     Ovarian cancer Neg Hx     Colon cancer Neg Hx      OB History    Para Term  AB Living   2 2 2     2   SAB TAB Ectopic Multiple Live Births           2      # Outcome Date GA Lbr Andreas/2nd Weight Sex Delivery Anes PTL Lv   2 Term      CS-Unspec  N LIDA   1 Term      CS-Unspec  N LIDA       Current Outpatient Medications:     escitalopram oxalate (LEXAPRO) 20 MG tablet, Take 20 mg by mouth., Disp: , Rfl:     ADDERALL XR 15 mg 24 hr capsule, , Disp: , Rfl:     dextroamphetamine-amphetamine 10 mg Tab, , Disp: , Rfl:     estrogens,conjugated,-methyltestosterone 0.625-1.25mg (ESTRATEST HS) 0.625-1.25 mg per tablet, Take 1 tablet by mouth once daily., Disp: 30 tablet, Rfl: 5    hydrochlorothiazide (HYDRODIURIL) 25 MG tablet, , Disp: , Rfl:     MULTIVITAMIN ORAL, Take by mouth., Disp: , Rfl:     tretinoin (RETIN-A) 0.025 % cream, Apply topically nightly. Wash off in am and apply sunscreen., Disp: 20 g, Rfl: 3    Review of Systems:  General: No fever, chills, or weight loss.  Chest: No chest pain, shortness of breath, or palpitations.  Breast: No pain, masses, or nipple discharge.  Vulva: No pain, lesions, or itching.  Vagina: No relaxation, itching, discharge, or lesions.  Abdomen: No pain, nausea, vomiting, diarrhea, or constipation.  Urinary: No incontinence, nocturia, frequency, or dysuria.  Extremities:  No leg cramps, edema, or calf pain.  Neurologic: No headaches, dizziness, or visual changes.    Vitals:    20 0906   BP: 138/78   Weight: 86.7 kg (191 lb 2.2 oz)   Height: 5' 7" (1.702 m)   PainSc: 0-No pain     Body mass index is 29.94 " kg/m².    Assessment:    Menopausal syndrome  -     Cancel: Estradiol; Future; Expected date: 11/24/2020  -     Cancel: Progesterone; Future; Expected date: 11/24/2020  -     Cancel: Testosterone, Free; Future; Expected date: 11/24/2020  -     US Pelvis Comp with Transvag NON-OB (xpd; Future; Expected date: 11/24/2020  -     Ambulatory referral/consult to Cardiology; Future; Expected date: 12/01/2020  -     Lipid panel; Future; Expected date: 11/24/2020  -     Estradiol; Future; Expected date: 11/24/2020  -     Testosterone, Free; Future; Expected date: 11/24/2020  -     Progesterone; Future; Expected date: 11/24/2020        Plan:   Risks and benefits of hormone replacement therapy were discussed.  Hormone replacement therapy options, including bioidentical versus non-bioidentical hormones, as well as alternatives discussed.  We discussed her Postmenopausal bleeding and current unopposed estrogen for the past year, will need evaluation of the endometrium prior to continuing HRT. ALso , in view of the family history will get Cardiology consult for clearance . Advised to resume taking prometrium.    Follow up in a few weeks  Will recheck labs once on typical optimal dose or if having side effects.  Instructed patient to call if she experiences any side effects or has any questions.  I spent 45 minutes with this patient today, >50% counseling.

## 2020-11-27 ENCOUNTER — HOSPITAL ENCOUNTER (OUTPATIENT)
Dept: RADIOLOGY | Facility: OTHER | Age: 60
Discharge: HOME | End: 2020-11-27
Attending: OBSTETRICS & GYNECOLOGY
Payer: COMMERCIAL

## 2020-11-27 DIAGNOSIS — N95.1 MENOPAUSAL SYNDROME: ICD-10-CM

## 2020-11-27 PROCEDURE — 76856 US EXAM PELVIC COMPLETE: CPT | Mod: 26,,, | Performed by: RADIOLOGY

## 2020-11-27 PROCEDURE — 76830 TRANSVAGINAL US NON-OB: CPT | Mod: TC

## 2020-11-27 PROCEDURE — 76856 US PELVIS COMP WITH TRANSVAG NON-OB (XPD): ICD-10-PCS | Mod: 26,,, | Performed by: RADIOLOGY

## 2020-11-27 PROCEDURE — 76830 US PELVIS COMP WITH TRANSVAG NON-OB (XPD): ICD-10-PCS | Mod: 26,,, | Performed by: RADIOLOGY

## 2020-11-27 PROCEDURE — 76830 TRANSVAGINAL US NON-OB: CPT | Mod: 26,,, | Performed by: RADIOLOGY

## 2020-11-29 LAB
CHOLEST SERPL-MCNC: 205 MG/DL (ref 100–199)
ESTRADIOL SERPL-MCNC: 26 PG/ML
HDLC SERPL-MCNC: 66 MG/DL
LDLC SERPL CALC-MCNC: 125 MG/DL (ref 0–99)
PROGEST SERPL-MCNC: 0.2 NG/ML
SPECIMEN STATUS REPORT: NORMAL
TESTOST FREE SERPL-MCNC: 1.7 PG/ML (ref 0–4.2)
TRIGL SERPL-MCNC: 81 MG/DL (ref 0–149)
VLDLC SERPL CALC-MCNC: 14 MG/DL (ref 5–40)

## 2020-11-30 ENCOUNTER — PATIENT MESSAGE (OUTPATIENT)
Dept: OBSTETRICS AND GYNECOLOGY | Facility: CLINIC | Age: 60
End: 2020-11-30

## 2020-11-30 DIAGNOSIS — Z78.0 MENOPAUSE: ICD-10-CM

## 2020-12-01 DIAGNOSIS — N95.1 MENOPAUSAL SYNDROME: Primary | ICD-10-CM

## 2020-12-01 RX ORDER — PROGESTERONE 100 MG/1
100 CAPSULE ORAL NIGHTLY
Qty: 30 CAPSULE | Refills: 12 | Status: SHIPPED | OUTPATIENT
Start: 2020-12-01 | End: 2020-12-28 | Stop reason: DRUGHIGH

## 2020-12-01 RX ORDER — ESTERIFIED ESTROGEN AND METHYLTESTOSTERONE .625; 1.25 MG/1; MG/1
1 TABLET ORAL DAILY
Qty: 30 TABLET | Refills: 5 | Status: SHIPPED | OUTPATIENT
Start: 2020-12-01 | End: 2020-12-28 | Stop reason: SDUPTHER

## 2020-12-01 NOTE — TELEPHONE ENCOUNTER
Called pt and scheduled EMBX on Wednesday 12/16/2020 at 9:45 am at [facility] location on the 5th floor.

## 2020-12-09 ENCOUNTER — OFFICE VISIT (OUTPATIENT)
Dept: URGENT CARE | Facility: CLINIC | Age: 60
End: 2020-12-09
Payer: COMMERCIAL

## 2020-12-09 VITALS
WEIGHT: 191 LBS | TEMPERATURE: 98 F | DIASTOLIC BLOOD PRESSURE: 93 MMHG | OXYGEN SATURATION: 95 % | SYSTOLIC BLOOD PRESSURE: 165 MMHG | HEIGHT: 67 IN | HEART RATE: 76 BPM | BODY MASS INDEX: 29.98 KG/M2

## 2020-12-09 DIAGNOSIS — Z20.822 CLOSE EXPOSURE TO COVID-19 VIRUS: Primary | ICD-10-CM

## 2020-12-09 LAB
CTP QC/QA: YES
SARS-COV-2 RDRP RESP QL NAA+PROBE: NEGATIVE

## 2020-12-09 PROCEDURE — U0002: ICD-10-PCS | Mod: QW,S$GLB,, | Performed by: INTERNAL MEDICINE

## 2020-12-09 PROCEDURE — 99214 PR OFFICE/OUTPT VISIT, EST, LEVL IV, 30-39 MIN: ICD-10-PCS | Mod: S$GLB,,, | Performed by: INTERNAL MEDICINE

## 2020-12-09 PROCEDURE — 3008F PR BODY MASS INDEX (BMI) DOCUMENTED: ICD-10-PCS | Mod: CPTII,S$GLB,, | Performed by: INTERNAL MEDICINE

## 2020-12-09 PROCEDURE — 3008F BODY MASS INDEX DOCD: CPT | Mod: CPTII,S$GLB,, | Performed by: INTERNAL MEDICINE

## 2020-12-09 PROCEDURE — U0002 COVID-19 LAB TEST NON-CDC: HCPCS | Mod: QW,S$GLB,, | Performed by: INTERNAL MEDICINE

## 2020-12-09 PROCEDURE — 99214 OFFICE O/P EST MOD 30 MIN: CPT | Mod: S$GLB,,, | Performed by: INTERNAL MEDICINE

## 2020-12-09 NOTE — PROGRESS NOTES
"Subjective:       Patient ID: Rogelio Davila is a 60 y.o. female.    Vitals:  height is 5' 7" (1.702 m) and weight is 86.6 kg (191 lb). Her temperature is 97.7 °F (36.5 °C). Her blood pressure is 165/93 (abnormal) and her pulse is 76. Her oxygen saturation is 95%.     Chief Complaint: COVID-19 Concerns      61 y/o female with PMHx of hyperlipidemia presents to urgent care c/o gradually worsening sore throat and headache that started 4-5 days ago. Symptoms have been constant and moderate since onset. Pt reports she was exposed to her son who she lives with who tested positive for COVID on Saturday 12/5/2020. Pt has been taking tylenol with minimal relief. Pt denies recent illness/travel, fever, chills, ear pain, nasal congestion, loss of smell/taste, cough, SOB, chest pain, N/V/D, abdominal pain, back pain, neck pain, vision changes.      URI   This is a new problem. The current episode started in the past 7 days. The problem has been unchanged. There has been no fever. Associated symptoms include headaches and a sore throat. Pertinent negatives include no chest pain, congestion, coughing, diarrhea, dysuria, nausea, rash or vomiting. She has tried acetaminophen for the symptoms. The treatment provided mild relief.       Constitution: Negative for chills, fatigue and fever.   HENT: Positive for sore throat. Negative for congestion.    Neck: Negative for painful lymph nodes.   Cardiovascular: Negative for chest pain and leg swelling.   Eyes: Negative for double vision and blurred vision.   Respiratory: Negative for cough and shortness of breath.    Gastrointestinal: Negative for nausea, vomiting and diarrhea.   Genitourinary: Negative for dysuria, frequency, urgency and history of kidney stones.   Musculoskeletal: Negative for joint pain, joint swelling, muscle cramps and muscle ache.   Skin: Negative for color change, pale, rash and bruising.   Allergic/Immunologic: Negative for seasonal allergies.   Neurological: " Positive for headaches. Negative for dizziness, history of vertigo, light-headedness and passing out.   Hematologic/Lymphatic: Negative for swollen lymph nodes.   Psychiatric/Behavioral: Negative for nervous/anxious, sleep disturbance and depression. The patient is not nervous/anxious.        Objective:      Physical Exam      Due to the state of emergency surrounding the COVID-19 pandemic, this exam was completed remotely per [facility]'s current protocol.    Assessment:       1. Close exposure to COVID-19 virus        Plan:         Close exposure to COVID-19 virus  -     POCT COVID-19 Rapid Screening      Results for orders placed or performed in visit on 12/09/20   POCT COVID-19 Rapid Screening   Result Value Ref Range    POC Rapid COVID Negative Negative     Acceptable Yes        *Discussed results/diagnosis/plan with patient in clinic. Educated extensively on COVID19. Answered all of patient's questions and concerns. Patient was given strict ED instructions. Patient verbally understood and agreed with treatment plan.       Patient Instructions   Below are suggestions for symptomatic relief:    - Tylenol every 4 hours OR Ibuprofen every 6 hours as needed for pain/fever/chills/body aches  - Salt water gargles to soothe throat pain.  - Chloroseptic spray also helps to numb throat pain.  - Warm face compresses to help with facial sinus pain/pressure.  - Vicks vapor rub at night.  - Flonase OTC nasal spray for nasal congestion.  - Simple foods like chicken noodle soup, smoothies, hot tea with lemon and honey  - If you were not given a prescription cough medication, then Delsym OTC helps with coughing at night  - Zyrtec/Claritin during the day & Benadryl at night may help with any allergies     If you DO NOT have Hypertension or any history of palpitations, it is ok to take over the counter Sudafed or Mucinex D or Allegra-D/Claritin-D/Zyrtec-D.  If you do take one of the above, it is ok to combine that  with plain over the counter Mucinex or Allegra or Claritin or Zyrtec. If, for example, you are taking Zyrtec -D, you can combine that with Mucinex, but not Mucinex-D.  If you are taking Mucinex-D, you can combine that with plain Allegra or Claritin or Zyrtec.     If you DO have Hypertension or palpitations, it is safe to take Coricidin HBP for relief of sinus symptoms.      *Please follow up with your primary care provider within 2-5 days if your signs and symptoms have not resolved or worsen.    *Please go immediately to the Emergency Department if you develop shortness of breath, chest pain, and uncontrollable fever above 100.4F       Please arrange follow up with your primary care doctor as soon as possible. You must understand that you've received an Urgent Care treatment only and that you may be released before all of your medical problems are known or treated. You, the patient, will arrange for follow up as instructed. If your symptoms worsen or fail to improve you should go to the Emergency Room.       SOCIAL DISTANCE + WEAR A MASK :)      Instructions for Patients with Confirmed or Suspected COVID-19    If you are awaiting your test result, you will either be called or it will be released to the patient portal.  If you have any questions about your test, please visit [email or web address] or call our COVID-19 information line at [phone].      Instructions for non-hospitalized or discharged patients with confirmed or suspected COVID-19:       Stay home except to get medical care.    Separate yourself from other people and animals in your home.    Call ahead before visiting your doctor.    Wear a face mask.    Cover your coughs and sneezes.    Clean your hands often.    Avoid sharing personal household items.    Clean all high-touch surfaces every day.    Monitor your symptoms. Seek prompt medical attention if your illness is worsening (e.g., difficulty breathing). Before seeking care,  call your healthcare provider.    If you have a medical emergency and must call 911, notify the dispatcher that you have or are being evaluated for COVID-19. If possible, put on a face mask before emergency medical services arrive.    Use the following symptom-based strategy to return to normal activity following a suspected or confirmed case of COVID-19. Continue isolation until:   o At least 3 days (72 hours) have passed since recovery defined as resolution of fever without the use of fever-reducing medications and improvement in respiratory symptoms (e.g. cough, shortness of breath), and   o At least 10 days have passed since the first positive test.       As one of the next steps, you will receive a call or text from the [place] Department of Health ([facility]) COVID-19 Tracing Team. See the contact information below so you know not to ignore the health departments call. It is important that you contact them back immediately so they can help.     Contact Tracer Number:  [phone]  Caller ID for most carriers: [facility]    What is contact tracing?   Contact tracing is a process that helps identify everyone who has been in close contact with an infected person. Contact tracers let those people know they may have been exposed and guide them on next steps. Confidentiality is important for everyone; no one will be told who may have exposed them to the virus.   Your involvement is important. The more we know about where and how this virus is spreading, the better chance we have at stopping it from spreading further.  What does exposure mean?   Exposure means you have been within 6 feet for more than 15 minutes with a person who has or had COVID-19.  What kind of questions do the contact tracers ask?   A contact tracer will confirm your basic contact information including name, address, phone number, and next of kin, as well as asking about any symptoms you may have had. Theyll also ask you how you think  you may have gotten sick, such as places where you may have been exposed to the virus, and people you were with. Those names will never be shared with anyone outside of that call, and will only be used to help trace and stop the spread of the virus.   I have privacy concerns. How will the state use my information?   Your privacy about your health is important. All calls are completed using call centers that use the appropriate health privacy protection measures (HIPAA compliance), meaning that your patient information is safe. No one will ever ask you any questions related to immigration status. Your health comes first.   Do I have to participate?   You do not have to participate, but we strongly encourage you to. Contact tracing can help us catch and control new outbreaks as theyre developing to keep your friends and family safe.   What if I dont hear from anyone?   If you dont receive a call within 24 hours, you can call the number above right away to inquire about your status. That line is open from 8:00 am - 8:00 p.m., 7 days a week.  Contact tracing saves lives! Together, we have the power to beat this virus and keep our loved ones and neighbors safe.       Instructions for household members, intimate partners and caregivers in a non-healthcare setting of a patient with confirmed or suspected COVID-19:         Close contacts should monitor their health and call their healthcare provider right away if they develop symptoms suggestive of COVID-19 (e.g., fever, cough, shortness of breath).    Stay home except to get medical care. Separate yourself from other people and animals in the home.   Monitor the patients symptoms. If the patient is getting sicker, call his or her healthcare provider. If the patient has a medical emergency and you need to call 911, notify the dispatch personnel that the patient has or is being evaluated for COVID-19.    Wear a facemask when around other people such as sharing a  room or vehicle and before entering a healthcare provider's office.   Cover coughs and sneezes with a tissue. Throw used tissues in a lined trash can immediately and wash hands.   Clean hands often with soap and water for at least 20 seconds or with an alcohol-based hand , rubbing hands together until they feel dry. Avoid touching your eyes, nose, and mouth with unwashed hands.   Clean all high-touch; surfaces every day, including counters, tabletops, doorknobs, bathroom fixtures, toilets, phones, keyboards, tablets, bedside tables, etc. Use a household cleaning spray or wipe according to label instructions.   Avoid sharing personal household items such as dishes, drinking glasses, cups, towels, bedding, etc. After these items are used, they should be washed thoroughly with soap and water.   Continue isolation until:   At least 3 days (72 hours) have passed since recovery defined as resolution of fever without the use of fever-reducing medications and improvement in respiratory symptoms (e.g. cough, shortness of breath), and    At least 10 days have passed since the patients first positive test.    https://www.cdc.gov/coronavirus/2019-ncov/your-health/index.htm

## 2020-12-09 NOTE — PATIENT INSTRUCTIONS
Below are suggestions for symptomatic relief:    - Tylenol every 4 hours OR Ibuprofen every 6 hours as needed for pain/fever/chills/body aches  - Salt water gargles to soothe throat pain.  - Chloroseptic spray also helps to numb throat pain.  - Warm face compresses to help with facial sinus pain/pressure.  - Vicks vapor rub at night.  - Flonase OTC nasal spray for nasal congestion.  - Simple foods like chicken noodle soup, smoothies, hot tea with lemon and honey  - If you were not given a prescription cough medication, then Delsym OTC helps with coughing at night  - Zyrtec/Claritin during the day & Benadryl at night may help with any allergies     If you DO NOT have Hypertension or any history of palpitations, it is ok to take over the counter Sudafed or Mucinex D or Allegra-D/Claritin-D/Zyrtec-D.  If you do take one of the above, it is ok to combine that with plain over the counter Mucinex or Allegra or Claritin or Zyrtec. If, for example, you are taking Zyrtec -D, you can combine that with Mucinex, but not Mucinex-D.  If you are taking Mucinex-D, you can combine that with plain Allegra or Claritin or Zyrtec.     If you DO have Hypertension or palpitations, it is safe to take Coricidin HBP for relief of sinus symptoms.      *Please follow up with your primary care provider within 2-5 days if your signs and symptoms have not resolved or worsen.    *Please go immediately to the Emergency Department if you develop shortness of breath, chest pain, and uncontrollable fever above 100.4F       Please arrange follow up with your primary care doctor as soon as possible. You must understand that you've received an Urgent Care treatment only and that you may be released before all of your medical problems are known or treated. You, the patient, will arrange for follow up as instructed. If your symptoms worsen or fail to improve you should go to the Emergency Room.       SOCIAL DISTANCE + WEAR A MASK :)      Instructions for  Patients with Confirmed or Suspected COVID-19    If you are awaiting your test result, you will either be called or it will be released to the patient portal.  If you have any questions about your test, please visit [email or web address] or call our COVID-19 information line at [phone].      Instructions for non-hospitalized or discharged patients with confirmed or suspected COVID-19:       Stay home except to get medical care.    Separate yourself from other people and animals in your home.    Call ahead before visiting your doctor.    Wear a face mask.    Cover your coughs and sneezes.    Clean your hands often.    Avoid sharing personal household items.    Clean all high-touch surfaces every day.    Monitor your symptoms. Seek prompt medical attention if your illness is worsening (e.g., difficulty breathing). Before seeking care, call your healthcare provider.    If you have a medical emergency and must call 911, notify the dispatcher that you have or are being evaluated for COVID-19. If possible, put on a face mask before emergency medical services arrive.    Use the following symptom-based strategy to return to normal activity following a suspected or confirmed case of COVID-19. Continue isolation until:   o At least 3 days (72 hours) have passed since recovery defined as resolution of fever without the use of fever-reducing medications and improvement in respiratory symptoms (e.g. cough, shortness of breath), and   o At least 10 days have passed since the first positive test.       As one of the next steps, you will receive a call or text from the [place] Department of Health ([facility]) COVID-19 Tracing Team. See the contact information below so you know not to ignore the health departments call. It is important that you contact them back immediately so they can help.     Contact Tracer Number:  [phone]  Caller ID for most carriers: [place] Dept Health    What is contact  tracing?   Contact tracing is a process that helps identify everyone who has been in close contact with an infected person. Contact tracers let those people know they may have been exposed and guide them on next steps. Confidentiality is important for everyone; no one will be told who may have exposed them to the virus.   Your involvement is important. The more we know about where and how this virus is spreading, the better chance we have at stopping it from spreading further.  What does exposure mean?   Exposure means you have been within 6 feet for more than 15 minutes with a person who has or had COVID-19.  What kind of questions do the contact tracers ask?   A contact tracer will confirm your basic contact information including name, address, phone number, and next of kin, as well as asking about any symptoms you may have had. Theyll also ask you how you think you may have gotten sick, such as places where you may have been exposed to the virus, and people you were with. Those names will never be shared with anyone outside of that call, and will only be used to help trace and stop the spread of the virus.   I have privacy concerns. How will the state use my information?   Your privacy about your health is important. All calls are completed using call centers that use the appropriate health privacy protection measures (HIPAA compliance), meaning that your patient information is safe. No one will ever ask you any questions related to immigration status. Your health comes first.   Do I have to participate?   You do not have to participate, but we strongly encourage you to. Contact tracing can help us catch and control new outbreaks as theyre developing to keep your friends and family safe.   What if I dont hear from anyone?   If you dont receive a call within 24 hours, you can call the number above right away to inquire about your status. That line is open from 8:00 am - 8:00 p.m., 7 days a  week.  Contact tracing saves lives! Together, we have the power to beat this virus and keep our loved ones and neighbors safe.       Instructions for household members, intimate partners and caregivers in a non-healthcare setting of a patient with confirmed or suspected COVID-19:         Close contacts should monitor their health and call their healthcare provider right away if they develop symptoms suggestive of COVID-19 (e.g., fever, cough, shortness of breath).    Stay home except to get medical care. Separate yourself from other people and animals in the home.   Monitor the patients symptoms. If the patient is getting sicker, call his or her healthcare provider. If the patient has a medical emergency and you need to call 911, notify the dispatch personnel that the patient has or is being evaluated for COVID-19.    Wear a facemask when around other people such as sharing a room or vehicle and before entering a healthcare provider's office.   Cover coughs and sneezes with a tissue. Throw used tissues in a lined trash can immediately and wash hands.   Clean hands often with soap and water for at least 20 seconds or with an alcohol-based hand , rubbing hands together until they feel dry. Avoid touching your eyes, nose, and mouth with unwashed hands.   Clean all high-touch; surfaces every day, including counters, tabletops, doorknobs, bathroom fixtures, toilets, phones, keyboards, tablets, bedside tables, etc. Use a household cleaning spray or wipe according to label instructions.   Avoid sharing personal household items such as dishes, drinking glasses, cups, towels, bedding, etc. After these items are used, they should be washed thoroughly with soap and water.   Continue isolation until:   At least 3 days (72 hours) have passed since recovery defined as resolution of fever without the use of fever-reducing medications and improvement in respiratory symptoms (e.g. cough, shortness of breath),  and    At least 10 days have passed since the patients first positive test.    https://www.cdc.gov/coronavirus/2019-ncov/your-health/index.htm

## 2020-12-09 NOTE — LETTER
[address], [address] ? [place], [address] ? Phone [phone] ? Fax [phone]           Return to Work/School    Patient: Rogelio Davila  YOB: 1960   Date: 12/09/2020      To Whom It May Concern:     Rogelio Davila was in contact with/seen in my office on 12/09/2020. COVID-19 is present in our communities across the [facility]. Not all patients are eligible or appropriate to be tested. In this situation, your employee meets the following criteria:     Rogelio Davila has met the criteria for COVID-19 testing and has a NEGATIVE result. The employee can return to work once they quarantine for 14 days from initial exposure (12/5/2020) AND are asymptomatic for 24 hours without the use of fever reducing medications (Tylenol, Motrin, etc).     If you have any questions or concerns, or if I can be of further assistance, please do not hesitate to contact me.     Sincerely,    Pretty Jay PA-C

## 2020-12-14 ENCOUNTER — CLINICAL SUPPORT (OUTPATIENT)
Dept: URGENT CARE | Facility: CLINIC | Age: 60
End: 2020-12-14
Payer: COMMERCIAL

## 2020-12-14 ENCOUNTER — PATIENT MESSAGE (OUTPATIENT)
Dept: OBSTETRICS AND GYNECOLOGY | Facility: CLINIC | Age: 60
End: 2020-12-14

## 2020-12-14 DIAGNOSIS — Z11.9 SCREENING EXAMINATION FOR UNSPECIFIED INFECTIOUS DISEASE: Primary | ICD-10-CM

## 2020-12-14 LAB
CTP QC/QA: YES
SARS-COV-2 RDRP RESP QL NAA+PROBE: NEGATIVE

## 2020-12-14 PROCEDURE — U0002 COVID-19 LAB TEST NON-CDC: HCPCS | Mod: QW,S$GLB,, | Performed by: PHYSICIAN ASSISTANT

## 2020-12-14 PROCEDURE — U0002: ICD-10-PCS | Mod: QW,S$GLB,, | Performed by: PHYSICIAN ASSISTANT

## 2020-12-14 NOTE — PATIENT INSTRUCTIONS
"NEGATIVE COVID TEST  -You have tested negative for COVID-19 today.  If you did not have a close exposure (as defined below) you can return to your normal daily activities to include social distancing, wearing a mask and frequent handwashing.  -A "close exposure" is defined as anyone who has had an exposure (masked or unmasked) to a known COVID -19 positive person within 6 ft for longer than 15 minutes. If your exposure meets this definition, you are required by CDC guidelines to quarantine for at least 7-10 days from time of exposure.  -The CDC states that a test can be performed for an asymptomatic patient (someone who does not have any symptoms) after a close exposure, and that a test should be done if you develop symptoms after a close exposure as described above.  -Specifically, you can test at day 5 or later if asymptomatic in order to get released from quarantine on day 7 or later.  If you develop symptoms sooner, you should test when your symptoms start.  -If you developed symptoms since the exposure, and your test was negative today and less than 5 days from your exposure, you still have to quarantine for 7-10 days from the date of the exposure.  -The 7-10 day quarantine begins from the day you were exposed, not the day of your test.  For example, if your exposure was on a Monday, and you waited until Friday of the same week to get tested and it was negative, your 7-10 day quarantine begins from that Monday, not the Friday you tested negative.  -Please note, if you decide to test as an asymptomatic during your quarantine and you are positive, you will be restarting your quarantine and moving from a possible 10 day quarantine (if you do not test), to a 11 day or greater quarantine.  "

## 2020-12-14 NOTE — LETTER
[address], [place] [address]  Phone: [phone]  Fax: [phone]          Return to Work/School    Patient: Rogelio Davila  YOB: 1960   Date: 12/14/2020     To Whom It May Concern:     Rogelio Davila was in contact with/seen in my office on 12/14/2020. COVID-19 is present in our communities across the state. There is limited testing for COVID at this time, so not all patients can be tested. In this situation, your employee meets the following criteria:     Rogelio Davila has met the criteria for COVID-19 testing and has a NEGATIVE result. The employee can return to work once they are asymptomatic for 24 hours without the use of fever reducing medications (Tylenol, Motrin, etc).     If you have any questions or concerns, or if I can be of further assistance, please do not hesitate to contact me.     Sincerely,    NURSE URGENT CARE, [facility]

## 2020-12-14 NOTE — PROGRESS NOTES
CDC Testing and Quarantine Guidelines for patients with exposure to a known-positive COVID-19 person:  -A close exposure is defined as anyone who has had an exposure (masked or unmasked) to a known COVID -19 positive person within 6 ft for longer than 15 minutes. If your exposure meets this definition you are required by CDC guidelines to quarantine for at least 7-10 days from time of exposure. The CDC states that a test can be performed for an asymptomatic patient (someone who does not have any symptoms) after a close exposure, and that a test should be done if you develop symptoms after a close exposure as described above.  Specifically, you can test at day 5 or later if asymptomatic, in order to get released from quarantine on day 7 or later.  -If you develop symptoms sooner, you should test when your symptoms start.  -If you meet the definition of a close exposure, it will not matter whether you are experiencing symptoms- a quarantine for at least 7-10 days after a close exposure is required by CDC guidelines.  -Please note, if you decide to test as an asymptomatic during your quarantine and you are positive, you will be restarting your quarantine and moving from a possible 10 day quarantine (if you do not test), to a 11 day or greater quarantine.  -The CDC also suggests people still monitor for symptoms for a full 14 days and remember that the shorter quarantine options do not replace initial CDC guidance.  The CDC continues to recommend quarantining for 14 days as the best way to reduce risk for spreading COVID-19 - however, this is only a recommendation.  -If your exposure does not meet the above definition, you can return to your normal daily activities to include social distancing, wearing a mask and frequent handwashing.

## 2020-12-14 NOTE — LETTER
"     [address], [address] [place] [address]  Phone: [phone]  Fax: [phone]          Return to Work/School    Patient: Rogelio Davila  YOB: 1960   Date: 12/14/2020     To Whom It May Concern:     Rogelio Davila was in contact with/seen in my office on 12/14/2020. COVID-19 is present in our communities across the state. There is limited testing for COVID at this time, so not all patients can be tested. In this situation, your employee meets the following criteria:     Rogelio Davila has met the criteria for COVID-19 testing and has a NEGATIVE result. The employee can return to work once they are asymptomatic for 24 hours without the use of fever reducing medications (Tylenol, Motrin, etc).  NEGATIVE COVID TEST  -You have tested negative for COVID-19 today.  If you did not have a close exposure (as defined below) you can return to your normal daily activities to include social distancing, wearing a mask and frequent handwashing.  -A "close exposure" is defined as anyone who has had an exposure (masked or unmasked) to a known COVID -19 positive person within 6 ft for longer than 15 minutes. If your exposure meets this definition, you are required by CDC guidelines to quarantine for at least 7-10 days from time of exposure.  -The CDC states that a test can be performed for an asymptomatic patient (someone who does not have any symptoms) after a close exposure, and that a test should be done if you develop symptoms after a close exposure as described above.  -Specifically, you can test at day 5 or later if asymptomatic in order to get released from quarantine on day 7 or later.  If you develop symptoms sooner, you should test when your symptoms start.  -If you developed symptoms since the exposure, and your test was negative today and less than 5 days from your exposure, you still have to quarantine for 7-10 days from the date of the exposure.  -The 7-10 day quarantine begins " from the day you were exposed, not the day of your test.  For example, if your exposure was on a Monday, and you waited until Friday of the same week to get tested and it was negative, your 7-10 day quarantine begins from that Monday, not the Friday you tested negative.  -Please note, if you decide to test as an asymptomatic during your quarantine and you are positive, you will be restarting your quarantine and moving from a possible 10 day quarantine (if you do not test), to a 11 day or greater quarantine.       If you have any questions or concerns, or if I can be of further assistance, please do not hesitate to contact me.     Sincerely,    NURSE URGENT CARE, [facility]

## 2020-12-16 ENCOUNTER — TELEPHONE (OUTPATIENT)
Dept: OBSTETRICS AND GYNECOLOGY | Facility: CLINIC | Age: 60
End: 2020-12-16

## 2020-12-16 ENCOUNTER — PROCEDURE VISIT (OUTPATIENT)
Dept: OBSTETRICS AND GYNECOLOGY | Facility: CLINIC | Age: 60
End: 2020-12-16
Payer: COMMERCIAL

## 2020-12-16 VITALS
WEIGHT: 193.81 LBS | DIASTOLIC BLOOD PRESSURE: 86 MMHG | BODY MASS INDEX: 30.42 KG/M2 | HEIGHT: 67 IN | SYSTOLIC BLOOD PRESSURE: 162 MMHG

## 2020-12-16 DIAGNOSIS — R93.89 THICKENED ENDOMETRIUM: Primary | ICD-10-CM

## 2020-12-16 LAB
B-HCG UR QL: NEGATIVE
CTP QC/QA: YES

## 2020-12-16 PROCEDURE — 88305 TISSUE EXAM BY PATHOLOGIST: CPT | Mod: 26,,, | Performed by: PATHOLOGY

## 2020-12-16 PROCEDURE — 58100 ENDOMETRIAL BIOPSY: ICD-10-PCS | Mod: S$GLB,,, | Performed by: OBSTETRICS & GYNECOLOGY

## 2020-12-16 PROCEDURE — 88305 TISSUE EXAM BY PATHOLOGIST: CPT | Performed by: PATHOLOGY

## 2020-12-16 PROCEDURE — 58100 BIOPSY OF UTERUS LINING: CPT | Mod: S$GLB,,, | Performed by: OBSTETRICS & GYNECOLOGY

## 2020-12-16 PROCEDURE — 88305 TISSUE EXAM BY PATHOLOGIST: ICD-10-PCS | Mod: 26,,, | Performed by: PATHOLOGY

## 2020-12-16 NOTE — PROCEDURES
Endometrial biopsy    Date/Time: 2020 9:45 AM  Performed by: Rachell Brewster MD  Authorized by: Rachell Brewster MD     Consent:     Consent obtained:  Written    Consent given by:  Patient    Procedural risks discussed:  Bleeding and infection    Patient questions answered: yes      Patient agrees, verbalizes understanding, and wants to proceed: yes      Educational handouts given: yes      Instructions and paperwork completed: yes    Indication:     Indications: Post-menopausal bleeding      Progression of post-menopausal bleeding:  Resolved  Pre-procedure:     Pre-procedure timeout performed: yes    Procedure:     A bivalve speculum was placed in the vagina: yes      Cervix cleaned and prepped: yes      A paracervical block was performed: no      An intracervical block was performed: no      The cervix was dilated: no      Uterus sounded: yes      Uterus sound depth (cm):  8    Specimen collected: specimen collected and sent to pathology      Patient tolerated procedure well with no complications: yes    Comments:     Procedure comments:  CC: ENDOMETRIAL BIOPSPY    Rogelio Davila is a 60 y.o. female  presents for an endometrial biopsy secondary to PMB when first began HRT, stopped her progesterone a year ago , and has thickened endometrium on ultrasound.       UPT is negative.    PRE ENDOMETRIAL BIOPSY COUNSELING:  The patient was informed of the risk of bleeding, infection, uterine perforation and pain and that the test will rule-out endometrial cancer with accuracy greater than 95%. She was counseled on the alternatives to endometrial biopsy and agrees to proceed.    TIME OUT PERFORMED.  The cervix was visualized with a speculum and prepped with betadine  A sterile endometrial pipelle was passed without difficulty to a depth of 8 cm.   Scant Endometrial tissue was obtained.    The specimen was placed in formalyn and sent to Pathology for histology evaluation.  THe patient tolerated the  procedure well.          ASSESSMENT and PLAN  Thickened endometrium  (primary encounter diagnosis)  Plan: Endometrial biopsy, Specimen to Pathology,         Ob/Gyn, POCT Urine Pregnancy      POST ENDOMETRIAL BIOPSY COUNSELING:  Manage post biopsy cramping with NSAIDs or Tylenol.  Expect spotting or light bleeding for a few days.  Report bleeding heavier than a period, fever > 101.0 F, worsening pain or a foul smelling vaginal discharge.    FOLLOW-UP: Pending biopsy results.  To call us in several days to discuss biopsy results and treatment plan.

## 2020-12-21 LAB
FINAL PATHOLOGIC DIAGNOSIS: NORMAL
GROSS: NORMAL

## 2020-12-28 ENCOUNTER — OFFICE VISIT (OUTPATIENT)
Dept: OBSTETRICS AND GYNECOLOGY | Facility: CLINIC | Age: 60
End: 2020-12-28
Attending: OBSTETRICS & GYNECOLOGY
Payer: COMMERCIAL

## 2020-12-28 VITALS
SYSTOLIC BLOOD PRESSURE: 140 MMHG | WEIGHT: 192.38 LBS | BODY MASS INDEX: 30.19 KG/M2 | HEIGHT: 67 IN | DIASTOLIC BLOOD PRESSURE: 82 MMHG

## 2020-12-28 DIAGNOSIS — Z78.0 MENOPAUSE: ICD-10-CM

## 2020-12-28 DIAGNOSIS — R93.89 THICKENED ENDOMETRIUM: ICD-10-CM

## 2020-12-28 DIAGNOSIS — Z01.419 ENCOUNTER FOR GYNECOLOGICAL EXAMINATION WITHOUT ABNORMAL FINDING: Primary | ICD-10-CM

## 2020-12-28 DIAGNOSIS — Z12.4 PAP SMEAR FOR CERVICAL CANCER SCREENING: ICD-10-CM

## 2020-12-28 PROCEDURE — 99396 PREV VISIT EST AGE 40-64: CPT | Mod: S$GLB,,, | Performed by: OBSTETRICS & GYNECOLOGY

## 2020-12-28 PROCEDURE — 3008F PR BODY MASS INDEX (BMI) DOCUMENTED: ICD-10-PCS | Mod: CPTII,S$GLB,, | Performed by: OBSTETRICS & GYNECOLOGY

## 2020-12-28 PROCEDURE — 88142 CYTOPATH C/V THIN LAYER: CPT

## 2020-12-28 PROCEDURE — 87624 HPV HI-RISK TYP POOLED RSLT: CPT

## 2020-12-28 PROCEDURE — 1126F AMNT PAIN NOTED NONE PRSNT: CPT | Mod: S$GLB,,, | Performed by: OBSTETRICS & GYNECOLOGY

## 2020-12-28 PROCEDURE — 99396 PR PREVENTIVE VISIT,EST,40-64: ICD-10-PCS | Mod: S$GLB,,, | Performed by: OBSTETRICS & GYNECOLOGY

## 2020-12-28 PROCEDURE — 1126F PR PAIN SEVERITY QUANTIFIED, NO PAIN PRESENT: ICD-10-PCS | Mod: S$GLB,,, | Performed by: OBSTETRICS & GYNECOLOGY

## 2020-12-28 PROCEDURE — 3008F BODY MASS INDEX DOCD: CPT | Mod: CPTII,S$GLB,, | Performed by: OBSTETRICS & GYNECOLOGY

## 2020-12-28 RX ORDER — ESTERIFIED ESTROGEN AND METHYLTESTOSTERONE .625; 1.25 MG/1; MG/1
1 TABLET ORAL DAILY
Qty: 30 TABLET | Refills: 5 | Status: SHIPPED | OUTPATIENT
Start: 2020-12-28 | End: 2021-02-01

## 2020-12-28 RX ORDER — PROGESTERONE 200 MG/1
200 CAPSULE ORAL NIGHTLY
Qty: 30 CAPSULE | Refills: 12 | Status: SHIPPED | OUTPATIENT
Start: 2020-12-28 | End: 2022-01-18

## 2020-12-28 NOTE — PROGRESS NOTES
Subjective:       Patient ID: Rogelio Davila is a 60 y.o. female.    Chief Complaint:  Well Woman      History of Present Illness  HPI    Rogelio Davila is a 60 y.o. female  here for her annual GYN exam. (former patient of Dr. Jaeger)  She underwent an EMB about 2 weeks ago for thickened endometrium on Ultrasound after having taken unopposed estrogen for about 9 months and the biopsy was insufficient showing:  Endometrium (curettage):   - Scant benign  endocervical tissue   - Insufficient endometrial tissue present for evaluation     She describes her periods as STOPPED WITH MENOPAUSE about 10 years ago, but had breakthrough bleeding last year  So she had stopped her progesterone as she thought it was the cause of her bleeding.   denies break through bleeding.   denies vaginal itching or irritation.  Denies vaginal discharge.  She is not sexually active. ()   History of abnormal pap: Yes - several years ago  Last Pap: approximate date  and was normal  Last MMG: normal-2020-routine follow-up in 12 months  Last Colonoscopy:  colonoscopy 10 years ago without abnormalities.  denies domestic violence. She does feel safe at home.     Past Medical History:   Diagnosis Date    Abnormal Pap smear of cervix     Hyperlipidemia      Past Surgical History:   Procedure Laterality Date     SECTION      x2    COLPOSCOPY       Social History     Socioeconomic History    Marital status:      Spouse name: Not on file    Number of children: Not on file    Years of education: Not on file    Highest education level: Not on file   Occupational History    Not on file   Social Needs    Financial resource strain: Not on file    Food insecurity     Worry: Not on file     Inability: Not on file    Transportation needs     Medical: Not on file     Non-medical: Not on file   Tobacco Use    Smoking status: Former Smoker     Quit date: 2013     Years since quittin.4    Smokeless  "tobacco: Former User   Substance and Sexual Activity    Alcohol use: Yes     Comment: occasionally    Drug use: No    Sexual activity: Not Currently     Comment:    Lifestyle    Physical activity     Days per week: Not on file     Minutes per session: Not on file    Stress: Not on file   Relationships    Social connections     Talks on phone: Not on file     Gets together: Not on file     Attends [employer] service: Not on file     Active member of club or organization: Not on file     Attends meetings of clubs or organizations: Not on file     Relationship status: Not on file   Other Topics Concern    Are you pregnant or think you may be? No    Breast-feeding Not Asked   Social History Narrative    Not on file     Family History   Problem Relation Age of Onset    Breast cancer Paternal Aunt 58    Cancer Maternal Grandfather         lung    Diabetes Maternal Grandfather     Stroke Father 62    Hypertension Father     Heart attacks under age 50 Father         smoker, drinker    Hypertension Mother     Melanoma Neg Hx     Ovarian cancer Neg Hx     Colon cancer Neg Hx      OB History        2    Para   2    Term   2            AB        Living   2       SAB        TAB        Ectopic        Multiple        Live Births   2                 BP (!) 140/82   Ht 5' 7" (1.702 m)   Wt 87.2 kg (192 lb 5.6 oz)   LMP 2010   BMI 30.13 kg/m²         GYN & OB History  Patient's last menstrual period was 2010.   Date of Last Pap: 2020    OB History    Para Term  AB Living   2 2 2     2   SAB TAB Ectopic Multiple Live Births           2      # Outcome Date GA Lbr Andreas/2nd Weight Sex Delivery Anes PTL Lv   2 Term      CS-Unspec  N LIDA   1 Term      CS-Unspec  N LIDA       Review of Systems  Review of Systems   Constitutional: Negative for activity change, appetite change, fatigue and unexpected weight change.   HENT: Negative.    Eyes: Negative for visual " disturbance.   Respiratory: Negative for shortness of breath and wheezing.    Cardiovascular: Negative for chest pain, palpitations and leg swelling.   Gastrointestinal: Negative for abdominal pain, bloating and blood in stool.   Endocrine: Negative for diabetes, hair loss and hot flashes.   Genitourinary: Positive for postmenopausal bleeding and vaginal dryness. Negative for decreased libido, dyspareunia and hot flashes.   Musculoskeletal: Negative for back pain and joint swelling.   Integumentary:  Negative for acne, hair changes and nipple discharge.   Neurological: Negative for headaches.   Hematological: Does not bruise/bleed easily.   Psychiatric/Behavioral: Negative for depression and sleep disturbance. The patient is not nervous/anxious.    Breast: Negative for mastodynia and nipple discharge          Objective:      Physical Exam:   Constitutional: She is oriented to person, place, and time. She appears well-developed and well-nourished.    HENT:   Head: Normocephalic and atraumatic.    Eyes: Pupils are equal, round, and reactive to light. EOM are normal.    Neck: Normal range of motion. Neck supple.    Cardiovascular: Normal rate and regular rhythm.     Pulmonary/Chest: Effort normal and breath sounds normal.   BREASTS:  no mass, no tenderness, no deformity and no retraction. Right breast exhibits no inverted nipple, no mass, no nipple discharge, no skin change, no tenderness, no bleeding and no swelling. Left breast exhibits no inverted nipple, no mass, no nipple discharge, no skin change, no tenderness, no bleeding and no swelling. Breasts are symmetrical.                Abdominal: Soft. Bowel sounds are normal.     Genitourinary:    Pelvic exam was performed with patient supine.      Genitourinary Comments: PELVIC: Normal external genitalia without lesions.  Normal hair distribution.  Adequate perineal body, normal urethral meatus.  Vagina  Dry and poorly rugated, atrophic, without lesions or discharge.   Cervix pink, without lesions, discharge or tenderness.  No significant cystocele or rectocele.  Bimanual exam shows uterus to be NOT PALPABLE SECONDARY TO HABITUS, nontender.  Adnexa without masses or tenderness.    RECTAL:Deferred               Musculoskeletal: Normal range of motion and moves all extremeties.       Neurological: She is alert and oriented to person, place, and time.    Skin: Skin is warm and dry.    Psychiatric: She has a normal mood and affect.              Assessment:        1. Encounter for gynecological examination without abnormal finding    2. Pap smear for cervical cancer screening    3. Thickened endometrium    4. Menopause                  Plan:        1. Encounter for gynecological examination without abnormal finding  COUNSELING:  The patient was counseled today on regular weight bearing exercise. Patient was counseled today on the new ACS guidelines for cervical cytology screening as well as the current recommendations for breast cancer screening. Counseling session lasted approximately 10 minutes, and all her questions were answered. She was advised to see her primary care physician for all other health maintenance.   FOLLOW-UP with me for next routine visit.         2. Pap smear for cervical cancer screening      - Liquid-Based Pap Smear, Screening  - HPV High Risk Genotypes, PCR    3. Thickened endometrium  (HAD emb, SHOWED ONLY ENDOCERVICAL CELLS.WILL NEED HYSTEROSCOPY, AS PATIENT WOULD PREFER TO AVOID HOSPITAL, WILL PLAN ON OFFICE HYSTEROSCOPY).    4. Menopause      - estrogens,conjugated,-methyltestosterone 0.625-1.25mg (ESTRATEST HS) 0.625-1.25 mg per tablet; Take 1 tablet by mouth once daily.  Dispense: 30 tablet; Refill: 5  - progesterone (PROMETRIUM) 200 MG capsule; Take 1 capsule (200 mg total) by mouth nightly.  Dispense: 30 capsule; Refill: 12       Follow up if symptoms worsen or fail to improve.

## 2021-01-08 ENCOUNTER — OFFICE VISIT (OUTPATIENT)
Dept: CARDIOLOGY | Facility: CLINIC | Age: 61
End: 2021-01-08
Attending: OBSTETRICS & GYNECOLOGY
Payer: COMMERCIAL

## 2021-01-08 VITALS
WEIGHT: 193.44 LBS | HEIGHT: 62 IN | SYSTOLIC BLOOD PRESSURE: 138 MMHG | BODY MASS INDEX: 35.6 KG/M2 | HEART RATE: 73 BPM | DIASTOLIC BLOOD PRESSURE: 93 MMHG

## 2021-01-08 DIAGNOSIS — N95.1 MENOPAUSAL SYNDROME: ICD-10-CM

## 2021-01-08 DIAGNOSIS — Z91.89 AT RISK FOR CORONARY ARTERY DISEASE: Primary | ICD-10-CM

## 2021-01-08 DIAGNOSIS — E66.9 OBESITY (BMI 30-39.9): ICD-10-CM

## 2021-01-08 DIAGNOSIS — I10 ESSENTIAL HYPERTENSION: ICD-10-CM

## 2021-01-08 PROCEDURE — 1126F AMNT PAIN NOTED NONE PRSNT: CPT | Mod: S$GLB,,, | Performed by: INTERNAL MEDICINE

## 2021-01-08 PROCEDURE — 3080F DIAST BP >= 90 MM HG: CPT | Mod: CPTII,S$GLB,, | Performed by: INTERNAL MEDICINE

## 2021-01-08 PROCEDURE — 1126F PR PAIN SEVERITY QUANTIFIED, NO PAIN PRESENT: ICD-10-PCS | Mod: S$GLB,,, | Performed by: INTERNAL MEDICINE

## 2021-01-08 PROCEDURE — 3075F PR MOST RECENT SYSTOLIC BLOOD PRESS GE 130-139MM HG: ICD-10-PCS | Mod: CPTII,S$GLB,, | Performed by: INTERNAL MEDICINE

## 2021-01-08 PROCEDURE — 99205 PR OFFICE/OUTPT VISIT, NEW, LEVL V, 60-74 MIN: ICD-10-PCS | Mod: S$GLB,,, | Performed by: INTERNAL MEDICINE

## 2021-01-08 PROCEDURE — 99205 OFFICE O/P NEW HI 60 MIN: CPT | Mod: S$GLB,,, | Performed by: INTERNAL MEDICINE

## 2021-01-08 PROCEDURE — 3008F PR BODY MASS INDEX (BMI) DOCUMENTED: ICD-10-PCS | Mod: CPTII,S$GLB,, | Performed by: INTERNAL MEDICINE

## 2021-01-08 PROCEDURE — 3008F BODY MASS INDEX DOCD: CPT | Mod: CPTII,S$GLB,, | Performed by: INTERNAL MEDICINE

## 2021-01-08 PROCEDURE — 3080F PR MOST RECENT DIASTOLIC BLOOD PRESSURE >= 90 MM HG: ICD-10-PCS | Mod: CPTII,S$GLB,, | Performed by: INTERNAL MEDICINE

## 2021-01-08 PROCEDURE — 3075F SYST BP GE 130 - 139MM HG: CPT | Mod: CPTII,S$GLB,, | Performed by: INTERNAL MEDICINE

## 2021-01-08 RX ORDER — CALCIUM CARBONATE 600 MG
600 TABLET ORAL ONCE
COMMUNITY

## 2021-01-12 LAB
HPV HR 12 DNA SPEC QL NAA+PROBE: NEGATIVE
HPV16 AG SPEC QL: NEGATIVE
HPV18 DNA SPEC QL NAA+PROBE: NEGATIVE

## 2021-01-22 ENCOUNTER — HOSPITAL ENCOUNTER (OUTPATIENT)
Dept: RADIOLOGY | Facility: HOSPITAL | Age: 61
Discharge: HOME | End: 2021-01-22
Attending: INTERNAL MEDICINE
Payer: COMMERCIAL

## 2021-01-22 DIAGNOSIS — Z91.89 AT RISK FOR CORONARY ARTERY DISEASE: ICD-10-CM

## 2021-01-22 PROCEDURE — 75571 CT HRT W/O DYE W/CA TEST: CPT | Mod: TC

## 2021-01-22 PROCEDURE — 75571 CT HRT W/O DYE W/CA TEST: CPT | Mod: 26,,, | Performed by: RADIOLOGY

## 2021-01-22 PROCEDURE — 75571 CT CALCIUM SCORING CARDIAC: ICD-10-PCS | Mod: 26,,, | Performed by: RADIOLOGY

## 2021-01-25 ENCOUNTER — TELEPHONE (OUTPATIENT)
Dept: OBSTETRICS AND GYNECOLOGY | Facility: CLINIC | Age: 61
End: 2021-01-25

## 2021-01-25 ENCOUNTER — TELEPHONE (OUTPATIENT)
Dept: CARDIOLOGY | Facility: CLINIC | Age: 61
End: 2021-01-25

## 2021-01-28 ENCOUNTER — PATIENT MESSAGE (OUTPATIENT)
Dept: OBSTETRICS AND GYNECOLOGY | Facility: CLINIC | Age: 61
End: 2021-01-28

## 2021-01-29 ENCOUNTER — TELEPHONE (OUTPATIENT)
Dept: OBSTETRICS AND GYNECOLOGY | Facility: CLINIC | Age: 61
End: 2021-01-29

## 2021-02-01 ENCOUNTER — TELEPHONE (OUTPATIENT)
Dept: OBSTETRICS AND GYNECOLOGY | Facility: CLINIC | Age: 61
End: 2021-02-01

## 2021-02-01 DIAGNOSIS — N95.1 MENOPAUSAL SYNDROME: Primary | ICD-10-CM

## 2021-02-01 LAB
FINAL PATHOLOGIC DIAGNOSIS: NORMAL
Lab: NORMAL

## 2021-02-01 RX ORDER — ESTRADIOL 0.05 MG/D
1 FILM, EXTENDED RELEASE TRANSDERMAL
Qty: 8 PATCH | Refills: 12 | Status: SHIPPED | OUTPATIENT
Start: 2021-02-01 | End: 2022-02-21 | Stop reason: SDUPTHER

## 2021-02-19 ENCOUNTER — PATIENT MESSAGE (OUTPATIENT)
Dept: OBSTETRICS AND GYNECOLOGY | Facility: CLINIC | Age: 61
End: 2021-02-19

## 2021-02-19 DIAGNOSIS — N88.2 STRICTURE AND STENOSIS OF CERVIX: Primary | ICD-10-CM

## 2021-02-19 RX ORDER — MISOPROSTOL 200 UG/1
200 TABLET ORAL EVERY 12 HOURS
Qty: 2 TABLET | Refills: 0 | Status: SHIPPED | OUTPATIENT
Start: 2021-02-19 | End: 2021-03-08

## 2021-02-25 ENCOUNTER — PROCEDURE VISIT (OUTPATIENT)
Dept: OBSTETRICS AND GYNECOLOGY | Facility: CLINIC | Age: 61
End: 2021-02-25
Payer: COMMERCIAL

## 2021-02-25 VITALS
DIASTOLIC BLOOD PRESSURE: 84 MMHG | SYSTOLIC BLOOD PRESSURE: 128 MMHG | WEIGHT: 191.56 LBS | HEIGHT: 62 IN | BODY MASS INDEX: 35.25 KG/M2

## 2021-02-25 DIAGNOSIS — N95.0 PMB (POSTMENOPAUSAL BLEEDING): Primary | ICD-10-CM

## 2021-02-25 DIAGNOSIS — R93.89 THICKENED ENDOMETRIUM: ICD-10-CM

## 2021-02-25 PROCEDURE — 58558 PR HYSTEROSCOPY,W/ENDO BX: ICD-10-PCS | Mod: S$GLB,,, | Performed by: OBSTETRICS & GYNECOLOGY

## 2021-02-25 PROCEDURE — 88305 TISSUE EXAM BY PATHOLOGIST: ICD-10-PCS | Mod: 26,,, | Performed by: PATHOLOGY

## 2021-02-25 PROCEDURE — 96372 THER/PROPH/DIAG INJ SC/IM: CPT | Mod: 59,S$GLB,, | Performed by: OBSTETRICS & GYNECOLOGY

## 2021-02-25 PROCEDURE — 58558 HYSTEROSCOPY BIOPSY: CPT | Mod: S$GLB,,, | Performed by: OBSTETRICS & GYNECOLOGY

## 2021-02-25 PROCEDURE — 88305 TISSUE EXAM BY PATHOLOGIST: CPT | Mod: 26,,, | Performed by: PATHOLOGY

## 2021-02-25 PROCEDURE — 88305 TISSUE EXAM BY PATHOLOGIST: CPT | Performed by: PATHOLOGY

## 2021-02-25 PROCEDURE — 96372 PR INJECTION,THERAP/PROPH/DIAG2ST, IM OR SUBCUT: ICD-10-PCS | Mod: 59,S$GLB,, | Performed by: OBSTETRICS & GYNECOLOGY

## 2021-02-25 RX ORDER — KETOROLAC TROMETHAMINE 30 MG/ML
30 INJECTION, SOLUTION INTRAMUSCULAR; INTRAVENOUS
Status: COMPLETED | OUTPATIENT
Start: 2021-02-25 | End: 2021-02-25

## 2021-02-25 RX ADMIN — KETOROLAC TROMETHAMINE 30 MG: 30 INJECTION, SOLUTION INTRAMUSCULAR; INTRAVENOUS at 09:02

## 2021-03-02 LAB
FINAL PATHOLOGIC DIAGNOSIS: NORMAL
GROSS: NORMAL

## 2021-03-08 ENCOUNTER — OFFICE VISIT (OUTPATIENT)
Dept: CARDIOLOGY | Facility: CLINIC | Age: 61
End: 2021-03-08
Payer: COMMERCIAL

## 2021-03-08 VITALS
HEART RATE: 75 BPM | SYSTOLIC BLOOD PRESSURE: 139 MMHG | HEIGHT: 62 IN | DIASTOLIC BLOOD PRESSURE: 76 MMHG | BODY MASS INDEX: 35.41 KG/M2 | WEIGHT: 192.44 LBS | OXYGEN SATURATION: 95 %

## 2021-03-08 DIAGNOSIS — Z91.89 AT RISK FOR CORONARY ARTERY DISEASE: ICD-10-CM

## 2021-03-08 DIAGNOSIS — E78.00 HYPERCHOLESTEREMIA: Primary | ICD-10-CM

## 2021-03-08 DIAGNOSIS — E66.9 OBESITY (BMI 30-39.9): ICD-10-CM

## 2021-03-08 DIAGNOSIS — I10 ESSENTIAL HYPERTENSION: ICD-10-CM

## 2021-03-08 PROCEDURE — 99214 OFFICE O/P EST MOD 30 MIN: CPT | Mod: S$GLB,,, | Performed by: INTERNAL MEDICINE

## 2021-03-08 PROCEDURE — 1126F AMNT PAIN NOTED NONE PRSNT: CPT | Mod: S$GLB,,, | Performed by: INTERNAL MEDICINE

## 2021-03-08 PROCEDURE — 3008F BODY MASS INDEX DOCD: CPT | Mod: CPTII,S$GLB,, | Performed by: INTERNAL MEDICINE

## 2021-03-08 PROCEDURE — 99999 PR PBB SHADOW E&M-EST. PATIENT-LVL IV: ICD-10-PCS | Mod: PBBFAC,,, | Performed by: INTERNAL MEDICINE

## 2021-03-08 PROCEDURE — 99999 PR PBB SHADOW E&M-EST. PATIENT-LVL IV: CPT | Mod: PBBFAC,,, | Performed by: INTERNAL MEDICINE

## 2021-03-08 PROCEDURE — 1126F PR PAIN SEVERITY QUANTIFIED, NO PAIN PRESENT: ICD-10-PCS | Mod: S$GLB,,, | Performed by: INTERNAL MEDICINE

## 2021-03-08 PROCEDURE — 3078F PR MOST RECENT DIASTOLIC BLOOD PRESSURE < 80 MM HG: ICD-10-PCS | Mod: CPTII,S$GLB,, | Performed by: INTERNAL MEDICINE

## 2021-03-08 PROCEDURE — 3078F DIAST BP <80 MM HG: CPT | Mod: CPTII,S$GLB,, | Performed by: INTERNAL MEDICINE

## 2021-03-08 PROCEDURE — 3008F PR BODY MASS INDEX (BMI) DOCUMENTED: ICD-10-PCS | Mod: CPTII,S$GLB,, | Performed by: INTERNAL MEDICINE

## 2021-03-08 PROCEDURE — 3075F PR MOST RECENT SYSTOLIC BLOOD PRESS GE 130-139MM HG: ICD-10-PCS | Mod: CPTII,S$GLB,, | Performed by: INTERNAL MEDICINE

## 2021-03-08 PROCEDURE — 99214 PR OFFICE/OUTPT VISIT, EST, LEVL IV, 30-39 MIN: ICD-10-PCS | Mod: S$GLB,,, | Performed by: INTERNAL MEDICINE

## 2021-03-08 PROCEDURE — 3075F SYST BP GE 130 - 139MM HG: CPT | Mod: CPTII,S$GLB,, | Performed by: INTERNAL MEDICINE

## 2021-03-08 RX ORDER — ASPIRIN 81 MG/1
TABLET ORAL DAILY
COMMUNITY
Start: 2015-07-01

## 2021-03-08 RX ORDER — EZETIMIBE 10 MG/1
10 TABLET ORAL DAILY
Qty: 90 TABLET | Refills: 3 | Status: SHIPPED | OUTPATIENT
Start: 2021-03-08 | End: 2023-06-05

## 2021-03-08 RX ORDER — ALIROCUMAB 75 MG/ML
INJECTION, SOLUTION SUBCUTANEOUS
Qty: 6 SYRINGE | Refills: 3 | OUTPATIENT
Start: 2021-03-08 | End: 2021-04-01 | Stop reason: SDUPTHER

## 2021-03-12 ENCOUNTER — SPECIALTY PHARMACY (OUTPATIENT)
Dept: PHARMACY | Facility: CLINIC | Age: 61
End: 2021-03-12

## 2021-03-13 LAB
25(OH)D3+25(OH)D2 SERPL-MCNC: 49.2 NG/ML (ref 30–100)
ALBUMIN SERPL-MCNC: 4.6 G/DL (ref 3.8–4.8)
ALP SERPL-CCNC: 87 IU/L (ref 39–117)
ALT SERPL-CCNC: 24 IU/L (ref 0–32)
AST SERPL-CCNC: 24 IU/L (ref 0–40)
BILIRUB DIRECT SERPL-MCNC: 0.08 MG/DL (ref 0–0.4)
BILIRUB SERPL-MCNC: 0.3 MG/DL (ref 0–1.2)
CHOLEST SERPL-MCNC: 227 MG/DL (ref 100–199)
HDLC SERPL-MCNC: 64 MG/DL
LDLC SERPL CALC-MCNC: 145 MG/DL (ref 0–99)
PROT SERPL-MCNC: 6.8 G/DL (ref 6–8.5)
TRIGL SERPL-MCNC: 105 MG/DL (ref 0–149)
VLDLC SERPL CALC-MCNC: 18 MG/DL (ref 5–40)

## 2021-03-15 ENCOUNTER — TELEPHONE (OUTPATIENT)
Dept: CARDIOLOGY | Facility: CLINIC | Age: 61
End: 2021-03-15

## 2021-03-29 ENCOUNTER — PATIENT MESSAGE (OUTPATIENT)
Dept: CARDIOLOGY | Facility: CLINIC | Age: 61
End: 2021-03-29

## 2021-04-01 DIAGNOSIS — E78.00 HYPERCHOLESTEREMIA: ICD-10-CM

## 2021-04-05 RX ORDER — ALIROCUMAB 75 MG/ML
INJECTION, SOLUTION SUBCUTANEOUS
Qty: 6 SYRINGE | Refills: 3 | Status: SHIPPED | OUTPATIENT
Start: 2021-04-05 | End: 2021-09-16

## 2021-09-14 ENCOUNTER — OFFICE VISIT (OUTPATIENT)
Dept: URGENT CARE | Facility: CLINIC | Age: 61
End: 2021-09-14
Payer: COMMERCIAL

## 2021-09-14 VITALS
RESPIRATION RATE: 18 BRPM | OXYGEN SATURATION: 97 % | SYSTOLIC BLOOD PRESSURE: 157 MMHG | HEART RATE: 69 BPM | DIASTOLIC BLOOD PRESSURE: 99 MMHG | WEIGHT: 192 LBS | BODY MASS INDEX: 35.33 KG/M2 | TEMPERATURE: 98 F | HEIGHT: 62 IN

## 2021-09-14 DIAGNOSIS — S52.125A CLOSED NONDISPLACED FRACTURE OF HEAD OF LEFT RADIUS, INITIAL ENCOUNTER: Primary | ICD-10-CM

## 2021-09-14 DIAGNOSIS — R29.6 FREQUENT FALLS: ICD-10-CM

## 2021-09-14 LAB
CTP QC/QA: YES
SARS-COV-2 RDRP RESP QL NAA+PROBE: NEGATIVE

## 2021-09-14 PROCEDURE — 3077F SYST BP >= 140 MM HG: CPT | Mod: CPTII,S$GLB,, | Performed by: INTERNAL MEDICINE

## 2021-09-14 PROCEDURE — 73070 X-RAY EXAM OF ELBOW: CPT | Mod: LT,S$GLB,, | Performed by: RADIOLOGY

## 2021-09-14 PROCEDURE — 3008F PR BODY MASS INDEX (BMI) DOCUMENTED: ICD-10-PCS | Mod: CPTII,S$GLB,, | Performed by: INTERNAL MEDICINE

## 2021-09-14 PROCEDURE — U0002: ICD-10-PCS | Mod: QW,S$GLB,, | Performed by: INTERNAL MEDICINE

## 2021-09-14 PROCEDURE — 3080F DIAST BP >= 90 MM HG: CPT | Mod: CPTII,S$GLB,, | Performed by: INTERNAL MEDICINE

## 2021-09-14 PROCEDURE — 1159F MED LIST DOCD IN RCRD: CPT | Mod: CPTII,S$GLB,, | Performed by: INTERNAL MEDICINE

## 2021-09-14 PROCEDURE — 3077F PR MOST RECENT SYSTOLIC BLOOD PRESSURE >= 140 MM HG: ICD-10-PCS | Mod: CPTII,S$GLB,, | Performed by: INTERNAL MEDICINE

## 2021-09-14 PROCEDURE — 3008F BODY MASS INDEX DOCD: CPT | Mod: CPTII,S$GLB,, | Performed by: INTERNAL MEDICINE

## 2021-09-14 PROCEDURE — 99215 PR OFFICE/OUTPT VISIT, EST, LEVL V, 40-54 MIN: ICD-10-PCS | Mod: 25,S$GLB,CS, | Performed by: INTERNAL MEDICINE

## 2021-09-14 PROCEDURE — 1160F RVW MEDS BY RX/DR IN RCRD: CPT | Mod: CPTII,S$GLB,, | Performed by: INTERNAL MEDICINE

## 2021-09-14 PROCEDURE — U0002 COVID-19 LAB TEST NON-CDC: HCPCS | Mod: QW,S$GLB,, | Performed by: INTERNAL MEDICINE

## 2021-09-14 PROCEDURE — 73030 X-RAY EXAM OF SHOULDER: CPT | Mod: LT,S$GLB,, | Performed by: RADIOLOGY

## 2021-09-14 PROCEDURE — 1160F PR REVIEW ALL MEDS BY PRESCRIBER/CLIN PHARMACIST DOCUMENTED: ICD-10-PCS | Mod: CPTII,S$GLB,, | Performed by: INTERNAL MEDICINE

## 2021-09-14 PROCEDURE — 1159F PR MEDICATION LIST DOCUMENTED IN MEDICAL RECORD: ICD-10-PCS | Mod: CPTII,S$GLB,, | Performed by: INTERNAL MEDICINE

## 2021-09-14 PROCEDURE — 3080F PR MOST RECENT DIASTOLIC BLOOD PRESSURE >= 90 MM HG: ICD-10-PCS | Mod: CPTII,S$GLB,, | Performed by: INTERNAL MEDICINE

## 2021-09-14 PROCEDURE — 96372 PR INJECTION,THERAP/PROPH/DIAG2ST, IM OR SUBCUT: ICD-10-PCS | Mod: S$GLB,,, | Performed by: EMERGENCY MEDICINE

## 2021-09-14 PROCEDURE — 96372 THER/PROPH/DIAG INJ SC/IM: CPT | Mod: S$GLB,,, | Performed by: EMERGENCY MEDICINE

## 2021-09-14 PROCEDURE — 73030 XR SHOULDER COMPLETE 2 OR MORE VIEWS LEFT: ICD-10-PCS | Mod: LT,S$GLB,, | Performed by: RADIOLOGY

## 2021-09-14 PROCEDURE — 99215 OFFICE O/P EST HI 40 MIN: CPT | Mod: 25,S$GLB,CS, | Performed by: INTERNAL MEDICINE

## 2021-09-14 PROCEDURE — 73070 XR ELBOW 2 VIEWS LEFT: ICD-10-PCS | Mod: LT,S$GLB,, | Performed by: RADIOLOGY

## 2021-09-14 RX ORDER — NAPROXEN 500 MG/1
500 TABLET ORAL 2 TIMES DAILY WITH MEALS
Qty: 14 TABLET | Refills: 0 | Status: SHIPPED | OUTPATIENT
Start: 2021-09-14 | End: 2021-09-16 | Stop reason: SDUPTHER

## 2021-09-14 RX ORDER — KETOROLAC TROMETHAMINE 30 MG/ML
30 INJECTION, SOLUTION INTRAMUSCULAR; INTRAVENOUS
Status: COMPLETED | OUTPATIENT
Start: 2021-09-14 | End: 2021-09-14

## 2021-09-14 RX ADMIN — KETOROLAC TROMETHAMINE 30 MG: 30 INJECTION, SOLUTION INTRAMUSCULAR; INTRAVENOUS at 02:09

## 2021-09-15 ENCOUNTER — TELEPHONE (OUTPATIENT)
Dept: NEUROLOGY | Facility: CLINIC | Age: 61
End: 2021-09-15

## 2021-09-16 ENCOUNTER — OFFICE VISIT (OUTPATIENT)
Dept: ORTHOPEDICS | Facility: CLINIC | Age: 61
End: 2021-09-16
Payer: COMMERCIAL

## 2021-09-16 ENCOUNTER — TELEPHONE (OUTPATIENT)
Dept: NEUROLOGY | Facility: CLINIC | Age: 61
End: 2021-09-16

## 2021-09-16 VITALS
TEMPERATURE: 99 F | WEIGHT: 193.56 LBS | HEIGHT: 62 IN | SYSTOLIC BLOOD PRESSURE: 136 MMHG | HEART RATE: 70 BPM | DIASTOLIC BLOOD PRESSURE: 80 MMHG | BODY MASS INDEX: 35.62 KG/M2 | OXYGEN SATURATION: 96 % | RESPIRATION RATE: 18 BRPM

## 2021-09-16 DIAGNOSIS — S52.125A CLOSED NONDISPLACED FRACTURE OF HEAD OF LEFT RADIUS, INITIAL ENCOUNTER: ICD-10-CM

## 2021-09-16 DIAGNOSIS — Z00.00 HEALTHCARE MAINTENANCE: Primary | ICD-10-CM

## 2021-09-16 PROCEDURE — 1159F MED LIST DOCD IN RCRD: CPT | Mod: CPTII,S$GLB,, | Performed by: ORTHOPAEDIC SURGERY

## 2021-09-16 PROCEDURE — 99204 PR OFFICE/OUTPT VISIT, NEW, LEVL IV, 45-59 MIN: ICD-10-PCS | Mod: S$GLB,,, | Performed by: ORTHOPAEDIC SURGERY

## 2021-09-16 PROCEDURE — 3008F PR BODY MASS INDEX (BMI) DOCUMENTED: ICD-10-PCS | Mod: CPTII,S$GLB,, | Performed by: ORTHOPAEDIC SURGERY

## 2021-09-16 PROCEDURE — 99999 PR PBB SHADOW E&M-EST. PATIENT-LVL V: ICD-10-PCS | Mod: PBBFAC,,, | Performed by: ORTHOPAEDIC SURGERY

## 2021-09-16 PROCEDURE — 1160F PR REVIEW ALL MEDS BY PRESCRIBER/CLIN PHARMACIST DOCUMENTED: ICD-10-PCS | Mod: CPTII,S$GLB,, | Performed by: ORTHOPAEDIC SURGERY

## 2021-09-16 PROCEDURE — 3079F PR MOST RECENT DIASTOLIC BLOOD PRESSURE 80-89 MM HG: ICD-10-PCS | Mod: CPTII,S$GLB,, | Performed by: ORTHOPAEDIC SURGERY

## 2021-09-16 PROCEDURE — 1160F RVW MEDS BY RX/DR IN RCRD: CPT | Mod: CPTII,S$GLB,, | Performed by: ORTHOPAEDIC SURGERY

## 2021-09-16 PROCEDURE — 3075F SYST BP GE 130 - 139MM HG: CPT | Mod: CPTII,S$GLB,, | Performed by: ORTHOPAEDIC SURGERY

## 2021-09-16 PROCEDURE — 99999 PR PBB SHADOW E&M-EST. PATIENT-LVL V: CPT | Mod: PBBFAC,,, | Performed by: ORTHOPAEDIC SURGERY

## 2021-09-16 PROCEDURE — 99204 OFFICE O/P NEW MOD 45 MIN: CPT | Mod: S$GLB,,, | Performed by: ORTHOPAEDIC SURGERY

## 2021-09-16 PROCEDURE — 1159F PR MEDICATION LIST DOCUMENTED IN MEDICAL RECORD: ICD-10-PCS | Mod: CPTII,S$GLB,, | Performed by: ORTHOPAEDIC SURGERY

## 2021-09-16 PROCEDURE — 3075F PR MOST RECENT SYSTOLIC BLOOD PRESS GE 130-139MM HG: ICD-10-PCS | Mod: CPTII,S$GLB,, | Performed by: ORTHOPAEDIC SURGERY

## 2021-09-16 PROCEDURE — 3079F DIAST BP 80-89 MM HG: CPT | Mod: CPTII,S$GLB,, | Performed by: ORTHOPAEDIC SURGERY

## 2021-09-16 PROCEDURE — 3008F BODY MASS INDEX DOCD: CPT | Mod: CPTII,S$GLB,, | Performed by: ORTHOPAEDIC SURGERY

## 2021-09-16 RX ORDER — AMOXICILLIN AND CLAVULANATE POTASSIUM 875; 125 MG/1; MG/1
TABLET, FILM COATED ORAL
COMMUNITY
Start: 2021-09-13 | End: 2021-09-20

## 2021-09-16 RX ORDER — NAPROXEN 500 MG/1
500 TABLET ORAL 2 TIMES DAILY WITH MEALS
Qty: 60 TABLET | Refills: 0 | Status: SHIPPED | OUTPATIENT
Start: 2021-09-16 | End: 2021-11-09

## 2021-09-17 ENCOUNTER — PATIENT MESSAGE (OUTPATIENT)
Dept: ORTHOPEDICS | Facility: CLINIC | Age: 61
End: 2021-09-17

## 2021-10-13 DIAGNOSIS — M25.522 LEFT ELBOW PAIN: Primary | ICD-10-CM

## 2021-10-14 ENCOUNTER — APPOINTMENT (OUTPATIENT)
Dept: RADIOLOGY | Facility: HOSPITAL | Age: 61
End: 2021-10-14
Attending: ORTHOPAEDIC SURGERY
Payer: COMMERCIAL

## 2021-10-14 ENCOUNTER — OFFICE VISIT (OUTPATIENT)
Dept: ORTHOPEDICS | Facility: CLINIC | Age: 61
End: 2021-10-14
Payer: COMMERCIAL

## 2021-10-14 VITALS
BODY MASS INDEX: 34.93 KG/M2 | WEIGHT: 189.81 LBS | DIASTOLIC BLOOD PRESSURE: 86 MMHG | HEIGHT: 62 IN | RESPIRATION RATE: 18 BRPM | HEART RATE: 76 BPM | SYSTOLIC BLOOD PRESSURE: 138 MMHG | OXYGEN SATURATION: 97 %

## 2021-10-14 DIAGNOSIS — S52.125A CLOSED NONDISPLACED FRACTURE OF HEAD OF LEFT RADIUS, INITIAL ENCOUNTER: Primary | ICD-10-CM

## 2021-10-14 DIAGNOSIS — M25.522 LEFT ELBOW PAIN: ICD-10-CM

## 2021-10-14 PROCEDURE — 1160F RVW MEDS BY RX/DR IN RCRD: CPT | Mod: CPTII,S$GLB,, | Performed by: ORTHOPAEDIC SURGERY

## 2021-10-14 PROCEDURE — 73080 X-RAY EXAM OF ELBOW: CPT | Mod: 26,LT,, | Performed by: RADIOLOGY

## 2021-10-14 PROCEDURE — 3008F BODY MASS INDEX DOCD: CPT | Mod: CPTII,S$GLB,, | Performed by: ORTHOPAEDIC SURGERY

## 2021-10-14 PROCEDURE — 73080 X-RAY EXAM OF ELBOW: CPT | Mod: TC,FY,PN,LT

## 2021-10-14 PROCEDURE — 99999 PR PBB SHADOW E&M-EST. PATIENT-LVL IV: CPT | Mod: PBBFAC,,, | Performed by: ORTHOPAEDIC SURGERY

## 2021-10-14 PROCEDURE — 3008F PR BODY MASS INDEX (BMI) DOCUMENTED: ICD-10-PCS | Mod: CPTII,S$GLB,, | Performed by: ORTHOPAEDIC SURGERY

## 2021-10-14 PROCEDURE — 1159F MED LIST DOCD IN RCRD: CPT | Mod: CPTII,S$GLB,, | Performed by: ORTHOPAEDIC SURGERY

## 2021-10-14 PROCEDURE — 3075F SYST BP GE 130 - 139MM HG: CPT | Mod: CPTII,S$GLB,, | Performed by: ORTHOPAEDIC SURGERY

## 2021-10-14 PROCEDURE — 99212 OFFICE O/P EST SF 10 MIN: CPT | Mod: S$GLB,,, | Performed by: ORTHOPAEDIC SURGERY

## 2021-10-14 PROCEDURE — 99212 PR OFFICE/OUTPT VISIT, EST, LEVL II, 10-19 MIN: ICD-10-PCS | Mod: S$GLB,,, | Performed by: ORTHOPAEDIC SURGERY

## 2021-10-14 PROCEDURE — 3079F DIAST BP 80-89 MM HG: CPT | Mod: CPTII,S$GLB,, | Performed by: ORTHOPAEDIC SURGERY

## 2021-10-14 PROCEDURE — 3079F PR MOST RECENT DIASTOLIC BLOOD PRESSURE 80-89 MM HG: ICD-10-PCS | Mod: CPTII,S$GLB,, | Performed by: ORTHOPAEDIC SURGERY

## 2021-10-14 PROCEDURE — 1159F PR MEDICATION LIST DOCUMENTED IN MEDICAL RECORD: ICD-10-PCS | Mod: CPTII,S$GLB,, | Performed by: ORTHOPAEDIC SURGERY

## 2021-10-14 PROCEDURE — 1160F PR REVIEW ALL MEDS BY PRESCRIBER/CLIN PHARMACIST DOCUMENTED: ICD-10-PCS | Mod: CPTII,S$GLB,, | Performed by: ORTHOPAEDIC SURGERY

## 2021-10-14 PROCEDURE — 3075F PR MOST RECENT SYSTOLIC BLOOD PRESS GE 130-139MM HG: ICD-10-PCS | Mod: CPTII,S$GLB,, | Performed by: ORTHOPAEDIC SURGERY

## 2021-10-14 PROCEDURE — 99999 PR PBB SHADOW E&M-EST. PATIENT-LVL IV: ICD-10-PCS | Mod: PBBFAC,,, | Performed by: ORTHOPAEDIC SURGERY

## 2021-10-14 PROCEDURE — 73080 XR ELBOW COMPLETE 3 VIEW LEFT: ICD-10-PCS | Mod: 26,LT,, | Performed by: RADIOLOGY

## 2021-11-19 ENCOUNTER — PATIENT MESSAGE (OUTPATIENT)
Dept: OBSTETRICS AND GYNECOLOGY | Facility: CLINIC | Age: 61
End: 2021-11-19
Payer: COMMERCIAL

## 2021-11-19 ENCOUNTER — TELEPHONE (OUTPATIENT)
Dept: OBSTETRICS AND GYNECOLOGY | Facility: CLINIC | Age: 61
End: 2021-11-19
Payer: COMMERCIAL

## 2021-11-19 DIAGNOSIS — Z12.31 SCREENING MAMMOGRAM, ENCOUNTER FOR: Primary | ICD-10-CM

## 2021-11-23 ENCOUNTER — HOSPITAL ENCOUNTER (OUTPATIENT)
Dept: RADIOLOGY | Facility: HOSPITAL | Age: 61
Discharge: HOME | End: 2021-11-23
Attending: OBSTETRICS & GYNECOLOGY
Payer: COMMERCIAL

## 2021-11-23 DIAGNOSIS — Z12.31 SCREENING MAMMOGRAM, ENCOUNTER FOR: ICD-10-CM

## 2021-11-23 PROCEDURE — 77067 SCR MAMMO BI INCL CAD: CPT | Mod: TC,PO

## 2021-11-23 PROCEDURE — 77067 SCR MAMMO BI INCL CAD: CPT | Mod: 26,,, | Performed by: RADIOLOGY

## 2021-11-23 PROCEDURE — 77063 BREAST TOMOSYNTHESIS BI: CPT | Mod: 26,,, | Performed by: RADIOLOGY

## 2021-11-23 PROCEDURE — 77063 MAMMO DIGITAL SCREENING BILAT WITH TOMO: ICD-10-PCS | Mod: 26,,, | Performed by: RADIOLOGY

## 2021-11-23 PROCEDURE — 77067 MAMMO DIGITAL SCREENING BILAT WITH TOMO: ICD-10-PCS | Mod: 26,,, | Performed by: RADIOLOGY

## 2021-12-15 ENCOUNTER — OFFICE VISIT (OUTPATIENT)
Dept: NEUROLOGY | Facility: CLINIC | Age: 61
End: 2021-12-15
Payer: COMMERCIAL

## 2021-12-15 VITALS
HEART RATE: 77 BPM | BODY MASS INDEX: 34.78 KG/M2 | DIASTOLIC BLOOD PRESSURE: 94 MMHG | WEIGHT: 189 LBS | HEIGHT: 62 IN | SYSTOLIC BLOOD PRESSURE: 150 MMHG

## 2021-12-15 DIAGNOSIS — G44.86 CERVICOGENIC HEADACHE: ICD-10-CM

## 2021-12-15 DIAGNOSIS — H81.92 VESTIBULOPATHY OF LEFT EAR: ICD-10-CM

## 2021-12-15 DIAGNOSIS — F09 COGNITIVE AND NEUROBEHAVIORAL DYSFUNCTION FOLLOWING BRAIN INJURY: ICD-10-CM

## 2021-12-15 DIAGNOSIS — S13.4XXA WHIPLASH, INITIAL ENCOUNTER: ICD-10-CM

## 2021-12-15 DIAGNOSIS — F07.81 POST-CONCUSSION SYNDROME: ICD-10-CM

## 2021-12-15 DIAGNOSIS — R42 DIZZINESS AND GIDDINESS: ICD-10-CM

## 2021-12-15 DIAGNOSIS — G31.89 COGNITIVE AND NEUROBEHAVIORAL DYSFUNCTION FOLLOWING BRAIN INJURY: ICD-10-CM

## 2021-12-15 DIAGNOSIS — G44.329 CHRONIC POST-TRAUMATIC HEADACHE, NOT INTRACTABLE: ICD-10-CM

## 2021-12-15 DIAGNOSIS — R29.6 FALLS FREQUENTLY: ICD-10-CM

## 2021-12-15 DIAGNOSIS — S06.0X1A CONCUSSION WITH LOSS OF CONSCIOUSNESS OF 30 MINUTES OR LESS, INITIAL ENCOUNTER: Primary | ICD-10-CM

## 2021-12-15 DIAGNOSIS — M54.81 BILATERAL OCCIPITAL NEURALGIA: ICD-10-CM

## 2021-12-15 DIAGNOSIS — S06.9XAS COGNITIVE AND NEUROBEHAVIORAL DYSFUNCTION FOLLOWING BRAIN INJURY: ICD-10-CM

## 2021-12-15 DIAGNOSIS — Z86.73 HISTORY OF TIA (TRANSIENT ISCHEMIC ATTACK): ICD-10-CM

## 2021-12-15 PROCEDURE — 99999 PR PBB SHADOW E&M-EST. PATIENT-LVL IV: CPT | Mod: PBBFAC,,, | Performed by: PSYCHIATRY & NEUROLOGY

## 2021-12-15 PROCEDURE — 99204 PR OFFICE/OUTPT VISIT, NEW, LEVL IV, 45-59 MIN: ICD-10-PCS | Mod: S$GLB,,, | Performed by: PSYCHIATRY & NEUROLOGY

## 2021-12-15 PROCEDURE — 99204 OFFICE O/P NEW MOD 45 MIN: CPT | Mod: S$GLB,,, | Performed by: PSYCHIATRY & NEUROLOGY

## 2021-12-15 PROCEDURE — 99999 PR PBB SHADOW E&M-EST. PATIENT-LVL IV: ICD-10-PCS | Mod: PBBFAC,,, | Performed by: PSYCHIATRY & NEUROLOGY

## 2021-12-15 RX ORDER — GABAPENTIN 300 MG/1
300 CAPSULE ORAL NIGHTLY
Qty: 30 CAPSULE | Refills: 3 | Status: SHIPPED | OUTPATIENT
Start: 2021-12-15 | End: 2022-03-30

## 2021-12-23 ENCOUNTER — CLINICAL SUPPORT (OUTPATIENT)
Dept: REHABILITATION | Facility: HOSPITAL | Age: 61
End: 2021-12-23
Attending: PSYCHIATRY & NEUROLOGY
Payer: COMMERCIAL

## 2021-12-23 DIAGNOSIS — S13.4XXA WHIPLASH, INITIAL ENCOUNTER: ICD-10-CM

## 2021-12-23 DIAGNOSIS — H81.92 VESTIBULOPATHY OF LEFT EAR: ICD-10-CM

## 2021-12-23 DIAGNOSIS — G44.309 POST-TRAUMATIC HEADACHE, NOT INTRACTABLE, UNSPECIFIED CHRONICITY PATTERN: ICD-10-CM

## 2021-12-23 DIAGNOSIS — R29.898 DECREASED ROM OF NECK: ICD-10-CM

## 2021-12-23 DIAGNOSIS — F07.81 POST-CONCUSSION SYNDROME: ICD-10-CM

## 2021-12-23 DIAGNOSIS — Z74.09 IMPAIRED FUNCTIONAL MOBILITY, BALANCE, AND ENDURANCE: ICD-10-CM

## 2021-12-23 DIAGNOSIS — H81.12 BPPV (BENIGN PAROXYSMAL POSITIONAL VERTIGO), LEFT: ICD-10-CM

## 2021-12-23 PROCEDURE — 95992 CANALITH REPOSITIONING PROC: CPT | Mod: PN | Performed by: PHYSICAL THERAPIST

## 2021-12-23 PROCEDURE — 97161 PT EVAL LOW COMPLEX 20 MIN: CPT | Mod: PN | Performed by: PHYSICAL THERAPIST

## 2021-12-24 PROBLEM — Z74.09 IMPAIRED FUNCTIONAL MOBILITY, BALANCE, AND ENDURANCE: Status: ACTIVE | Noted: 2021-12-24

## 2021-12-24 PROBLEM — G44.309 POST-TRAUMATIC HEADACHE, NOT INTRACTABLE: Status: ACTIVE | Noted: 2021-12-24

## 2021-12-24 PROBLEM — R29.898 DECREASED ROM OF NECK: Status: ACTIVE | Noted: 2021-12-24

## 2021-12-24 PROBLEM — H81.12 BPPV (BENIGN PAROXYSMAL POSITIONAL VERTIGO), LEFT: Status: ACTIVE | Noted: 2021-12-24

## 2021-12-24 PROBLEM — H81.92 VESTIBULOPATHY OF LEFT EAR: Status: ACTIVE | Noted: 2021-12-24

## 2021-12-29 ENCOUNTER — CLINICAL SUPPORT (OUTPATIENT)
Dept: REHABILITATION | Facility: HOSPITAL | Age: 61
End: 2021-12-29
Attending: PSYCHIATRY & NEUROLOGY
Payer: COMMERCIAL

## 2021-12-29 DIAGNOSIS — H81.92 VESTIBULOPATHY OF LEFT EAR: ICD-10-CM

## 2021-12-29 DIAGNOSIS — H81.12 BPPV (BENIGN PAROXYSMAL POSITIONAL VERTIGO), LEFT: ICD-10-CM

## 2021-12-29 DIAGNOSIS — R29.898 DECREASED ROM OF NECK: ICD-10-CM

## 2021-12-29 DIAGNOSIS — Z74.09 IMPAIRED FUNCTIONAL MOBILITY, BALANCE, AND ENDURANCE: ICD-10-CM

## 2021-12-29 DIAGNOSIS — G44.309 POST-TRAUMATIC HEADACHE, NOT INTRACTABLE, UNSPECIFIED CHRONICITY PATTERN: ICD-10-CM

## 2021-12-29 PROCEDURE — 97110 THERAPEUTIC EXERCISES: CPT | Mod: PN | Performed by: PHYSICAL THERAPIST

## 2021-12-29 PROCEDURE — 97112 NEUROMUSCULAR REEDUCATION: CPT | Mod: PN,59 | Performed by: PHYSICAL THERAPIST

## 2021-12-29 PROCEDURE — 95992 CANALITH REPOSITIONING PROC: CPT | Mod: PN | Performed by: PHYSICAL THERAPIST

## 2021-12-30 ENCOUNTER — DOCUMENTATION ONLY (OUTPATIENT)
Dept: REHABILITATION | Facility: HOSPITAL | Age: 61
End: 2021-12-30
Payer: COMMERCIAL

## 2021-12-30 ENCOUNTER — HOSPITAL ENCOUNTER (OUTPATIENT)
Dept: RADIOLOGY | Facility: HOSPITAL | Age: 61
Discharge: HOME | End: 2021-12-30
Attending: PSYCHIATRY & NEUROLOGY
Payer: COMMERCIAL

## 2021-12-30 DIAGNOSIS — Z86.73 HISTORY OF TIA (TRANSIENT ISCHEMIC ATTACK): ICD-10-CM

## 2021-12-30 DIAGNOSIS — R42 DIZZINESS AND GIDDINESS: ICD-10-CM

## 2021-12-30 DIAGNOSIS — R29.6 FALLS FREQUENTLY: ICD-10-CM

## 2021-12-30 PROCEDURE — 70551 MRI BRAIN STEM W/O DYE: CPT | Mod: 26,,, | Performed by: RADIOLOGY

## 2021-12-30 PROCEDURE — 70551 MRI BRAIN WITHOUT CONTRAST: ICD-10-PCS | Mod: 26,,, | Performed by: RADIOLOGY

## 2021-12-30 PROCEDURE — 70551 MRI BRAIN STEM W/O DYE: CPT | Mod: TC

## 2022-01-04 ENCOUNTER — OFFICE VISIT (OUTPATIENT)
Dept: NEUROLOGY | Facility: CLINIC | Age: 62
End: 2022-01-04
Payer: COMMERCIAL

## 2022-01-04 ENCOUNTER — CLINICAL SUPPORT (OUTPATIENT)
Dept: REHABILITATION | Facility: HOSPITAL | Age: 62
End: 2022-01-04
Attending: PSYCHIATRY & NEUROLOGY
Payer: COMMERCIAL

## 2022-01-04 DIAGNOSIS — H81.92 VESTIBULOPATHY OF LEFT EAR: ICD-10-CM

## 2022-01-04 DIAGNOSIS — H81.12 BPPV (BENIGN PAROXYSMAL POSITIONAL VERTIGO), LEFT: ICD-10-CM

## 2022-01-04 DIAGNOSIS — F41.1 GAD (GENERALIZED ANXIETY DISORDER): Primary | ICD-10-CM

## 2022-01-04 DIAGNOSIS — F90.0 ATTENTION DEFICIT HYPERACTIVITY DISORDER (ADHD), PREDOMINANTLY INATTENTIVE TYPE: ICD-10-CM

## 2022-01-04 DIAGNOSIS — R29.898 DECREASED ROM OF NECK: ICD-10-CM

## 2022-01-04 DIAGNOSIS — Z74.09 IMPAIRED FUNCTIONAL MOBILITY, BALANCE, AND ENDURANCE: ICD-10-CM

## 2022-01-04 DIAGNOSIS — G44.309 POST-TRAUMATIC HEADACHE, NOT INTRACTABLE, UNSPECIFIED CHRONICITY PATTERN: ICD-10-CM

## 2022-01-04 PROCEDURE — 90791 PR PSYCHIATRIC DIAGNOSTIC EVALUATION: ICD-10-PCS | Mod: 95,,, | Performed by: CLINICAL NEUROPSYCHOLOGIST

## 2022-01-04 PROCEDURE — 99499 NO LOS: ICD-10-PCS | Mod: 95,,, | Performed by: CLINICAL NEUROPSYCHOLOGIST

## 2022-01-04 PROCEDURE — 97110 THERAPEUTIC EXERCISES: CPT | Mod: PN | Performed by: PHYSICAL THERAPIST

## 2022-01-04 PROCEDURE — 99499 UNLISTED E&M SERVICE: CPT | Mod: 95,,, | Performed by: CLINICAL NEUROPSYCHOLOGIST

## 2022-01-04 PROCEDURE — 90791 PSYCH DIAGNOSTIC EVALUATION: CPT | Mod: 95,,, | Performed by: CLINICAL NEUROPSYCHOLOGIST

## 2022-01-04 PROCEDURE — 97112 NEUROMUSCULAR REEDUCATION: CPT | Mod: PN | Performed by: PHYSICAL THERAPIST

## 2022-01-04 NOTE — ASSESSMENT & PLAN NOTE
Assessment:  -longstanding symptoms primarily consisting of somatic symptoms (sleep trouble, muscle tension) and worry (milder)  -worse since her concussion and likely impacting cognition  -also, this is a risk factor for prolonged congitive sxs post-concussion  Plan:  -will assess in neuropsych exam to what extent anxiety is primarily or partially causing cognitive symptoms and make treatment plan for her

## 2022-01-04 NOTE — PROGRESS NOTES
NEUROPSYCHOLOGY CONSULT (TELEHEALTH)    Referral Information  Name: Rogelio Davila  MRN: [id number]  : 1960  Age: 61 y.o.  Gender: female  Referring Provider: Derik Tejada Iii, Md  [address]  [address]  [place] [address]  Billing: See below for details as coding/billing has changed   Telemedicine:   The patient location is: Home  The provider location is: [facility]  The chief complaint leading to consultation/medical necessity is: Cognitive and mood concerns s/p concussion  Visit type: Virtual visit with synchronous audio and video     Total time spent with patient: 38-minutes with patient + 15-minutes records/note  Each patient to whom he or she provides medical services by telemedicine is:  (1) informed of the relationship between the physician and patient and the respective role of any other health care provider with respect to management of the patient; and (2) notified that he or she may decline to receive medical services by telemedicine and may withdraw from such care at any time.  Consent/Emergency Plan: The patient expressed an understanding of the purpose of the evaluation and consented to all procedures.     SUMMARY/TREATMENT PLAN   Results from the interview indicate the following diagnoses and treatment plan recommendations. This was discussed with the patient who can follow a treatment plan independently.    Diagnoses/Plan:  Problem List Items Addressed This Visit        Psychiatric    Attention deficit hyperactivity disorder (ADHD), predominantly inattentive type    SUN (generalized anxiety disorder) - Primary    Current Assessment & Plan     Assessment:  -longstanding symptoms primarily consisting of somatic symptoms (sleep trouble, muscle tension) and worry (milder)  -worse since her concussion and likely impacting cognition  -also, this is a risk factor for prolonged congitive sxs post-concussion  Plan:  -will assess in neuropsych exam to what extent anxiety is primarily or partially  "causing cognitive symptoms and make treatment plan for her             Other Visit Diagnoses     Mild neurocognitive disorder due to traumatic brain injury, sequela        neuropsych testing upcoming               HISTORY OF PRESENT ILLNESS AND CURRENT SYMPTOMS     Ms. Davila has active problems noted below.    Cognitive Symptoms:   Onset: Historically, ADHD just treated 3-years ago. Has noticed some waning stimulant effectiveness in the past year (predating concussion) with med changes to good effect.  In August 2021, she experienced fall and mild concussion.  Afterwards she noticed various concussion symptoms including cognitive slowing, increased attention trouble, and more forgetfulness.   Course: Some cognitive improvement over the past 5-months. Currently, she is 75% of her baseline and she particularly notices slowed processing speed and forgetfulness.  No significant day-to-day cognitive variability but worsening of vertigo and nausea do worsen cognitive symptoms.    Current Functional Status/Needs:  ADLs  Self-Care Eating Safety   Bathing: Independent  Bathroom: Independent  Other: Independent Independent     Instrumental IADLs:   Driving Medications/Health Household Finances   -Independent -Independent -Independent -Independent     Psychiatric/Behavioral Symptoms:  Mood:  Depression/Dysphoria Anxiety/Fearfulness Irritability   -Feels more depressed (flat, less interactive) and attributes to staying home and more restrictions 2/2 vertigo -"I'm an optimist" but more open later to acknowledging anxiety  -Currently, anxiety is quite significant but reported longstanding anxiety since young. Sxs are characterized by muscle tension, sleep trouble, some looping around triggering interpersonal interactions.  -None     Behavior:  Agitation/Resistance Delusions/Paranoia Hallucinations   None None None     Apathy/Motivation Repetitive/Restlessness Other   None None None     Neurovegetative:  Sleep/Nighttime  " "[employer]   -"Off and on" attributes to neck tension long term  -Recently, gabapentin is helping somewhat Good Good with Adderall but notices she fatigues more     Suicidal/Homicidal Ideation: None reported    Physical Symptoms:   +Somatic anxiety symptoms (clenching, neck tightening) longstanding and worse post-concussion  +Vestibular symptoms post August 2021 concussion and "a lot better" with vestibular rehab     PERTINENT BACKGROUND INFORMATION   SOCIAL HISTORY  · Family Status:  in 2003 + 1-son (lives with her for the past 2-years) and 1-daughter  · Current Living Situation: Home with adult son  · Primary Source of Support: Son  · Daily Activities: Walks her dog, socializing, but reduced in the past 5-6months 2/2 vertigo  · Stressors: Some family stressors, health,   · Other Factors:  · Educational Level: HS + 1-year  · Longstanding attention trouble but could compensate fairly well.   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 ) for 18-years    Family History   Problem Relation Age of Onset    Breast cancer Paternal Aunt 58    Cancer Maternal Grandfather         lung    Diabetes Maternal Grandfather     Stroke Father 62    Hypertension Father     Heart attacks under age 50 Father         smoker, drinker    Hypertension Mother     Melanoma Neg Hx     Ovarian cancer Neg Hx     Colon cancer Neg Hx      Family Neurologic History: Negative for heritable risk factors  Family Psychiatric History: Anxiety, Alcohol trouble (Father)    MEDICAL STATUS  Patient Active Problem List   Diagnosis    Obesity    Menopause present    BPPV (benign paroxysmal positional vertigo), left    Impaired functional mobility, balance, and endurance    Post-traumatic headache, not intractable    Decreased ROM of neck    Vestibulopathy of left ear    Attention deficit hyperactivity disorder (ADHD), predominantly inattentive type    SUN " (generalized anxiety disorder)     Past Medical History:   Diagnosis Date    Abnormal Pap smear of cervix     Hyperlipidemia      Past Surgical History:   Procedure Laterality Date     SECTION      x2    COLPOSCOPY         Updated/Relevant Neurologic History:  · Falls: Yes previously but none recently  · TBI: Yes, mild concussion 20 (see ED notes and Neurology follow-up)  · Seizures: None  · Stroke: None  · Movement Concerns: None      Recent Labs  No results found for: [id number]  No results found for: RPR  No results found for: FOLATE  Lab Results   Component Value Date    TSH 1.237 2018    Y3JKKYJ 113 2007    D1ANIBP 6.7 2007     Lab Results   Component Value Date    HGBA1C 5.7 2011     No results found for: HIV1X2, ABV79LGCM    Imaging  Results for orders placed or performed during the hospital encounter of 21   MRI Brain Without Contrast    Narrative    EXAMINATION:  MRI BRAIN WITHOUT CONTRAST    CLINICAL HISTORY:  Dizziness, persistent/recurrent, cardiac or vascular cause suspected; Repeated falls    TECHNIQUE:  Multiplanar multisequence MR imaging of the brain was performed without contrast.    COMPARISON:  None.    FINDINGS:  Additional comment: Examination is mildly motion compromised    Parenchyma: There is no restricted diffusion to suggest acute or subacute ischemic infarct.Generalized pattern of age-related parenchymal volume loss is noted.  Scattered areas of nonspecific frontoparietal predominant T2/FLAIR hyperintense signal are nonspecific, but may relate to sequela of chronic small vessel ischemic disease.    Additional comments: There is no midline shift, abnormal extra-axial fluid collection, or acute intracranial hemorrhage. The basal cisterns are patent.    Ventricles: Normal.    Flow voids: The normal major intracranial arterial flow voids are visualized.    Sinuses and mastoid air cells: Essentially clear    Orbits: Normal    Midline structures:  The pituitary and craniocervical junction are normal.    Marrow: Normal        Impression    1. No acute intracranial findings.  No definite etiology of the patient's symptoms identified.  2. Nonspecific areas of frontoparietal white matter signal abnormality could relate to sequelae of chronic small vessel ischemic disease.      Electronically signed by: Pancho Rosales  Date:    2021  Time:    08:35         Current Outpatient Medications:     ADDERALL XR 15 mg 24 hr capsule, Take 15 mg by mouth once daily. , Disp: , Rfl:     aspirin (ASPIRIN LOW DOSE) 81 MG EC tablet, once daily., Disp: , Rfl:     calcium carbonate (CALCIUM 600) 600 mg calcium (1,500 mg) Tab, Take 600 mg by mouth once., Disp: , Rfl:     dextroamphetamine-amphetamine 10 mg Tab, Take 10 mg by mouth once daily. Pt states she takes in the afternoon, Disp: , Rfl:     escitalopram oxalate (LEXAPRO) 20 MG tablet, Take 20 mg by mouth., Disp: , Rfl:     estradiol 0.05 mg/24 hr td ptsw (VIVELLE-DOT) 0.05 mg/24 hr, Place 1 patch onto the skin twice a week., Disp: 8 patch, Rfl: 12    ezetimibe (ZETIA) 10 mg tablet, Take 1 tablet (10 mg total) by mouth once daily., Disp: 90 tablet, Rfl: 3    gabapentin (NEURONTIN) 300 MG capsule, Take 1 capsule (300 mg total) by mouth every evening., Disp: 30 capsule, Rfl: 3    hydrochlorothiazide (HYDRODIURIL) 25 MG tablet, Take 25 mg by mouth once daily. , Disp: , Rfl:     MULTIVITAMIN ORAL, Take by mouth., Disp: , Rfl:     naproxen (EC NAPROSYN) 500 MG EC tablet, TAKE 1 TABLET(500 MG) BY MOUTH TWICE DAILY WITH MEALS, Disp: 60 tablet, Rfl: 0    progesterone (PROMETRIUM) 200 MG capsule, Take 1 capsule (200 mg total) by mouth nightly., Disp: 30 capsule, Rfl: 12    Updated/Relevant Psychiatric History:  +Adulthood:   ++Symptoms: Longstanding ADHD and anxiety but no treatment until later in adulthood  ++Treatment:  2003: Some grief counseling after     -Current: Diagnosedwith ADHD and prescribed  "Adderall (Current: 15mg XR and 10mg in afternoon)  2020-Current: Started lexapro for depression and anxiety by PCP  Substance Use: Occasional etoh use    MENTAL STATUS AND OBSERVATIONS:  APPEARANCE: Casually dressed and adequate grooming/hygiene.   ALERTNESS/ORIENTATION: Attentive and alert. Fully oriented (x5) to time and place  GAIT: Not assessed  MOTOR MOVEMENTS/MANNERISMS: Not assessed  SPEECH/LANGUAGE: Normal in rate, rhythm, tone, and volume. No significant word finding difficulty noted. Expressive and receptive language was normal.  STATED MOOD/AFFECT: The patients stated mood was " anxious" Affect was congruent with stated mood.   INTERPERSONAL BEHAVIOR: Rapport was quickly and easily established   SUICIDALITY/HOMICIDALITY: None reported  HALLUCINATIONS/DELUSIONS: None evidenced or endorsed  THOUGHT PROCESSES/INSIGHT: Thoughts seemed logical and goal-directed.     BILLING  Service Description CPT Code Minutes Units   Psychiatric diagnostic evaluation by physician [phone]   Neurobehavioral status exam by physician [id number]  0   Each additional hour by physician [id number]  0                     "

## 2022-01-04 NOTE — PATIENT INSTRUCTIONS
Levator Stretch        Grasp seat or sit on hand on side to be stretched. Turn head toward other side and look down. Use hand on head to gently stretch neck in that position. Hold 15-30 seconds. Repeat on other side.  Repeat 3 times each side. Do 1-2 sessions per day.  Upper Trapezius Stretch        Look straight ahead. Gently grasp right side of head while reaching behind back with other hand. Tilt head away until a gentle stretch is felt. Hold 15-30 seconds. Repeat on opposite side.   Repeat 3 times each side. Do 1-2 sessions per day.    Copyright © 4687-9362 [employer]

## 2022-01-06 ENCOUNTER — CLINICAL SUPPORT (OUTPATIENT)
Dept: REHABILITATION | Facility: HOSPITAL | Age: 62
End: 2022-01-06
Attending: PSYCHIATRY & NEUROLOGY
Payer: COMMERCIAL

## 2022-01-06 DIAGNOSIS — G44.309 POST-TRAUMATIC HEADACHE, NOT INTRACTABLE, UNSPECIFIED CHRONICITY PATTERN: ICD-10-CM

## 2022-01-06 DIAGNOSIS — Z74.09 IMPAIRED FUNCTIONAL MOBILITY, BALANCE, AND ENDURANCE: ICD-10-CM

## 2022-01-06 DIAGNOSIS — R29.898 DECREASED ROM OF NECK: ICD-10-CM

## 2022-01-06 DIAGNOSIS — H81.92 VESTIBULOPATHY OF LEFT EAR: ICD-10-CM

## 2022-01-06 DIAGNOSIS — H81.12 BPPV (BENIGN PAROXYSMAL POSITIONAL VERTIGO), LEFT: ICD-10-CM

## 2022-01-06 PROCEDURE — 97140 MANUAL THERAPY 1/> REGIONS: CPT | Mod: PN

## 2022-01-06 PROCEDURE — 97110 THERAPEUTIC EXERCISES: CPT | Mod: PN

## 2022-01-06 NOTE — PROGRESS NOTES
Physical Therapy Daily Treatment Note     Name: Rogelio Davila  Clinic Number: [id number]    Therapy Diagnosis:   Encounter Diagnoses   Name Primary?    BPPV (benign paroxysmal positional vertigo), left     Impaired functional mobility, balance, and endurance     Post-traumatic headache, not intractable, unspecified chronicity pattern     Decreased ROM of neck     Vestibulopathy of left ear      Physician: Derik Tejada III, MD  Visit Date: 1/6/2022    Physician Orders: Derik Tejada III, MD  Physician Orders: PT Eval and Treat vestibular rehab, neck  Medical Diagnosis from Referral:   S13.4XXA (ICD-10-CM) - Whiplash, initial encounter   H81.92 (ICD-10-CM) - Vestibulopathy of left ear   F07.81 (ICD-10-CM) - Post-concussion syndrome      Evaluation Date: 12/23/2021  Authorization Period Expiration: 12/31/2022  Plan of Care Expiration: 2/18/2022  Visit # / Visits authorized: 2/ 20 (+ eval)     Time In: 1115  Time Out: 1200  Total Billable Time: 45 minutes    Precautions: Standard    Subjective     Pt reports: dizziness/veritgo symptoms have improved dramatically. Continues to suffer with significant tension/tightness in neck and shoulders  She was compliant with home exercise program for balance  Response to previous treatment:nauseous, but then felt good the next day  Functional change: denies dizziness    Pain: 5/10  Location: headache/B neck+shoulders     Objective     Rogelio participated in neuromuscular re-education activities to improve: Balance and vestibular system for 20 minutes. The following activities were included:  Functional Gait Assessment:   1. Gait on level surface =  3, 5.5 sec   (3) Normal: less than 5.5 sec, no A.D., no imbalance, normal gait pattern, deviates< 6in   (2) Mild impairment: 7-5.6 sec, uses A.D., mild gait deviations, or deviates 6-10 in   (1) Moderate impairment: > 7 sec, slow speed, imbalance, deviates 10-15 in.   (0) Severe impairment: needs assist, deviates >15 in,  reach/touch wall  2. Change in Gait Speed = 3   (3) Normal: smooth change w/o loss of balance or gait deviation, deviates < 6 in, significant difference between speeds   (2) Mild impairment: changes speed, but demonstrates mild gait deviations, deviates 6-10 in, OR no deviations but unable to significantly speed, OR uses A.D.   (1) Moderate impairment: minor changes to speed, OR changes speed w/ significant deviations, deviates 10-15 in, OR  Changes speed , but loses balance & recovers   (0) Severe impairment: cannot change speed, deviates >15 in, or loses balance & needs assist  3. Gait with horizontal head turns  = 2   (3) Normal: no change in gait, deviates <6 in   (2) Mild impairment: slight change in speed, deviates 6-10 in, OR uses A.D.   (1) Moderate impairment: moderate change in speed, deviates 10-15 in   (0) Severe impairment: severe disruption of gait, deviates >15in  4. Gait with vertical head turns = 3   (3) Normal: no change in gait, deviates <6 in   (2) Mild impairment: slight change in speed, deviates 6-10 in OR uses A.D.   (1) Moderate impairment: moderate change in speed, deviates 10-15 in   (0) Severe impairment: severe disruption of gait, deviates >15 in  5. Gait with pivot turns = 3   (3) Normal: performs safely in 3 sec, no LOB   (2) Mild impairment: performs in >3 sec & no LOB, OR turns safely & requires several steps to regain LOB   (1) Moderate impairment: turns slow, OR requires several small steps for balance following turn & stop   (0) Severe impairment: cannot turn safely, needs assist  6. Step over obstacle = 3   (3) Normal: steps over 2 stacked boxes w/o change in speed or LOB   (2) Mild impairment: able to step over 1 box w/o change in speed or LOB   (1) Moderate impairment: steps over 1 box but must slow down, may require VC   (0) Severe impairment: cannot perform w/o assist  7. Gait with Narrow COMPA = 3   (3) Normal: 10 steps no staggering   (2) Mild impairment: 7-9 steps   (1)  Moderate impairment: 4-7 steps   (0) Severe impairment: < 4 steps or cannot perform w/o assist  8. Gait with eyes closed = 0- veers L   (3) Normal: < 7 sec, no A.D., no LOB, normal gait pattern, deviates <6 in   (2) Mild impairment: 7.1-9 sec, mild gait deviations, deviates 6-10 in   (1) Moderate impairment: > 9 sec, abnormal pattern, LOB, deviates 10-15 in   (0) Severe impairment: cannot perform w/o assist, LOB, deviates >15in  9. Ambulating Backwards = 2   (3) Normal: no A.D., no LOB, normal gait pattern, deviates <6in   (2) Mild impairment: uses A.D., slower speed, mild gait deviations, deviates 6-10 in   (1) Moderate impairment: slow speed, abnormal gait pattern, LOB, deviates 10-15 in   (0) Severe impairment: severe gait deviations or LOB, deviates >15in  10. Steps = 3   (3) Normal: alternating feet, no rail   (2) Mild Impairment: alternating feet, uses rail   (1) Moderate impairment: step-to, uses rail   (0) Severe impairment: cannot perform safely  Score 25/30     Score:   <22/30 fall risk   <20/30 fall risk in older adults   <18/30 fall risk in Parkinsons      // bar:    Foam, romberg, horizontal head turns 2 x 30 sec= min-mod sway   Foam, romberg, EC 2 x30 sec= mod sway   Tandem stance on foam x 30 sec   Tandem, horizontal head turns 2 x 30 sec= min-mod sway   Tandem, firm, EC x 30 sec= poor to fair balance      Rogelio received therapeutic exercises to develop strength, flexibility, posture and core stabilization for 23 minutes including:  UBE L1 3/3    Sitting: Upper trapezius  Stretch 2 x 30 sec   Levator scapulae  Stretch 2 x 30 sec   Cervical isometrics for flexion and SB YTB 10x    Standing: wall slides with pillow case 3 x 10   Serratus stars YTB x10   Rowing YTB 2 x 10   scapular depression YTB 2 x 10       Rogelio received manual therapy with soft tissue mobilization to B Cervical 25 minutes including:  Dry Needling    Pt cleared of contraindications and verbal and written consent acquired. Pt given  option of copy of consent form. Pt educated on benefits and potential side effects of DN. DN performed for 25 minutes with Pt in prone position with involved side B Cervical. DN performed at B Upper Trap Trigger-point and Cervical Paraspinal/suboccipital musculature (4 needles) for migraine protocol with NMES + winding.     Patient provided written and verbal consent to receive functional dry needling at today's visit (see consent form scanned into chart). FDN performed to reduce pain and muscle tension, promote blood flow, and improve ROM and function x 25 minutes. Pt tolerated tx well without adverse effects. Pt was educated on what to expect following the procedure and Pt verbalized understanding.    Home Exercises Provided and Patient Education Provided     Education provided:   - continue with HEP, advanced to include stretching and cervical isometrics    Written Home Exercises Provided: Patient instructed to cont prior HEP.  Exercises were reviewed and Rogelio was able to demonstrate them prior to the end of the session.  Rogelio demonstrated good  understanding of the education provided.     See EMR under Patient Instructions for exercises provided 12/29/2021.    Assessment     Rogelio tolerated treatment session very well, presented with no dizziness symptoms at beginning of session, chief complaint of neck tightness and tenison headache. Pt consented to dry needling to B upper trap and cervical paraspinal/suboccipital musculature; no adverse events; significant reduction of symptoms at conclusion of manual techniques. Increased tissue elongation sesnation noted during flexibility training following FDN today.      Rogelio Is progressing well towards her goals.   Pt prognosis is Good.     Pt will continue to benefit from skilled outpatient physical therapy to address the deficits listed in the problem list box on initial evaluation, provide pt/family education and to maximize pt's level of independence in the home  and community environment.     Pt's spiritual, cultural and educational needs considered and pt agreeable to plan of care and goals.     Anticipated barriers to physical therapy: history of prior vertigo    Goals:   Short Term Goals: 5 weeks   1. Pt will present with a (-) [place] Hallpike/ roll test to demonstrate a resolution of BPPV. ongoing  2. Pt will report decreased impact of dizziness on daily life to moderate by scoring 45/100 on DHI. ongoing  3. Assess FGA and set goals as needed. Met 1/4/22- 25/30  4. Pt will report decreased maximal cervical tightness to 5/10 to demonstrate improved muscle integrity for decreasing headache occurrence. ongoing  5. Pt will demonstrate ability to maintain a chin tuck for 20 sec to demonstrate improved endurance of postural muscles for decreased headaches and cervical tightness.  ongoing  6. Pt will demonstrate normal Visual Dynamic Acuity by performing with <4 line difference for improved tolerance to positional changes. ongoing     Long Term Goals: 10 weeks   1. Pt will perform HEP at least 3x/week for balance and cervical impairments to improve self management of sx. ongoing  2. Pt will report decreased impact of dizziness on daily life to minimal by scoring 20/100 on DHI. ongoing  3. Pt will deny significant dizziness associated with positional changes x 2 weeks to demonstrate improved ability to complete normal household tasks. ongoing  4. Pt will demonstrate improved cervical rotation to >65 deg to improve safety with driving. ongoing  5. Pt will report decreased headache occurrence to <25% of the time to demonstrate improved tolerance to daily mobility/ tasks. ongoing  6. Pt will perform romberg on foam with EC x 30 sec to demonstrate improvement of vestibular function for improved balance in dim environments. ongoing     Plan   Outpatient Physical Therapy 2 times weekly to include the following interventions: Cervical/Lumbar Traction, Manual Therapy, Moist Heat/ Ice,  Neuromuscular Re-ed, Patient Education, Therapeutic Activities, Therapeutic Exercise and canalith repositioning manuevers.      Address balance with horizontal head turns and eyes closed. Perform cervical strengthening and stretching. perform activities to inhibit UT recruitment.    Assess response to dry needling     Dashawn Seth, PT  1/6/2022

## 2022-01-10 ENCOUNTER — OFFICE VISIT (OUTPATIENT)
Dept: NEUROLOGY | Facility: CLINIC | Age: 62
End: 2022-01-10
Payer: COMMERCIAL

## 2022-01-10 DIAGNOSIS — S06.9XAS COGNITIVE AND NEUROBEHAVIORAL DYSFUNCTION FOLLOWING BRAIN INJURY: Primary | ICD-10-CM

## 2022-01-10 DIAGNOSIS — Z86.73 HISTORY OF TIA (TRANSIENT ISCHEMIC ATTACK): ICD-10-CM

## 2022-01-10 DIAGNOSIS — F41.1 GAD (GENERALIZED ANXIETY DISORDER): ICD-10-CM

## 2022-01-10 DIAGNOSIS — F07.81 POST-CONCUSSION SYNDROME: ICD-10-CM

## 2022-01-10 DIAGNOSIS — F09 COGNITIVE AND NEUROBEHAVIORAL DYSFUNCTION FOLLOWING BRAIN INJURY: Primary | ICD-10-CM

## 2022-01-10 DIAGNOSIS — F90.0 ATTENTION DEFICIT HYPERACTIVITY DISORDER (ADHD), PREDOMINANTLY INATTENTIVE TYPE: ICD-10-CM

## 2022-01-10 DIAGNOSIS — G31.89 COGNITIVE AND NEUROBEHAVIORAL DYSFUNCTION FOLLOWING BRAIN INJURY: Primary | ICD-10-CM

## 2022-01-10 PROCEDURE — 96139 PR PSYCH/NEUROPSYCH TEST ADMIN/SCORING, BY TECH, 2+ TESTS, EA ADDTL 30 MIN: ICD-10-PCS | Mod: S$GLB,,, | Performed by: CLINICAL NEUROPSYCHOLOGIST

## 2022-01-10 PROCEDURE — 96138 PSYCL/NRPSYC TECH 1ST: CPT | Mod: S$GLB,,, | Performed by: CLINICAL NEUROPSYCHOLOGIST

## 2022-01-10 PROCEDURE — 96132 NRPSYC TST EVAL PHYS/QHP 1ST: CPT | Mod: S$GLB,,, | Performed by: CLINICAL NEUROPSYCHOLOGIST

## 2022-01-10 PROCEDURE — 99499 NO LOS: ICD-10-PCS | Mod: S$GLB,,, | Performed by: CLINICAL NEUROPSYCHOLOGIST

## 2022-01-10 PROCEDURE — 96133 NRPSYC TST EVAL PHYS/QHP EA: CPT | Mod: S$GLB,,, | Performed by: CLINICAL NEUROPSYCHOLOGIST

## 2022-01-10 PROCEDURE — 96138 PR PSYCH/NEUROPSYCH TEST ADMIN/SCORING, BY TECH, 2+ TESTS, 1ST 30 MIN: ICD-10-PCS | Mod: S$GLB,,, | Performed by: CLINICAL NEUROPSYCHOLOGIST

## 2022-01-10 PROCEDURE — 99499 UNLISTED E&M SERVICE: CPT | Mod: S$GLB,,, | Performed by: CLINICAL NEUROPSYCHOLOGIST

## 2022-01-10 PROCEDURE — 96132 PR NEUROPSYCHOLOGIC TEST EVAL SVCS, 1ST HR: ICD-10-PCS | Mod: S$GLB,,, | Performed by: CLINICAL NEUROPSYCHOLOGIST

## 2022-01-10 PROCEDURE — 96133 PR NEUROPSYCHOLOGIC TEST EVAL SVCS, EA ADDTL HR: ICD-10-PCS | Mod: S$GLB,,, | Performed by: CLINICAL NEUROPSYCHOLOGIST

## 2022-01-10 PROCEDURE — 96139 PSYCL/NRPSYC TST TECH EA: CPT | Mod: S$GLB,,, | Performed by: CLINICAL NEUROPSYCHOLOGIST

## 2022-01-12 ENCOUNTER — CLINICAL SUPPORT (OUTPATIENT)
Dept: REHABILITATION | Facility: HOSPITAL | Age: 62
End: 2022-01-12
Attending: PSYCHIATRY & NEUROLOGY
Payer: COMMERCIAL

## 2022-01-12 DIAGNOSIS — Z74.09 IMPAIRED FUNCTIONAL MOBILITY, BALANCE, AND ENDURANCE: ICD-10-CM

## 2022-01-12 DIAGNOSIS — G44.319 ACUTE POST-TRAUMATIC HEADACHE, NOT INTRACTABLE: ICD-10-CM

## 2022-01-12 DIAGNOSIS — H81.12 BPPV (BENIGN PAROXYSMAL POSITIONAL VERTIGO), LEFT: Primary | ICD-10-CM

## 2022-01-12 DIAGNOSIS — H81.92 VESTIBULOPATHY OF LEFT EAR: ICD-10-CM

## 2022-01-12 DIAGNOSIS — R29.898 DECREASED ROM OF NECK: ICD-10-CM

## 2022-01-12 PROCEDURE — 97112 NEUROMUSCULAR REEDUCATION: CPT | Mod: PN,CQ

## 2022-01-12 PROCEDURE — 97110 THERAPEUTIC EXERCISES: CPT | Mod: PN,CQ

## 2022-01-12 NOTE — PROGRESS NOTES
Physical Therapy Daily Treatment Note     Name: Rogelio Davila  Clinic Number: [id number]    Therapy Diagnosis:   Encounter Diagnoses   Name Primary?    BPPV (benign paroxysmal positional vertigo), left Yes    Impaired functional mobility, balance, and endurance     Acute post-traumatic headache, not intractable     Decreased ROM of neck     Vestibulopathy of left ear      Physician: Derik Tejada III, MD    Visit Date: 1/12/2022    Physician Orders: Derik Tejada III, MD     Physician Orders: PT Eval and Treat vestibular rehab, neck  Medical Diagnosis from Referral:   S13.4XXA (ICD-10-CM) - Whiplash, initial encounter   H81.92 (ICD-10-CM) - Vestibulopathy of left ear   F07.81 (ICD-10-CM) - Post-concussion syndrome      Evaluation Date: 12/23/2021  Authorization Period Expiration: 12/31/2022  Plan of Care Expiration: 2/18/2022  Visit # / Visits authorized: 3/ 20 (+ eval)     Time In: 945  Time Out: 1030  Total Billable Time: 45 minutes    Precautions: Standard    Subjective     Pt reports:she is feeling great. Feels like the therapy is working. She was very painful the day after needles but was better day 2.  She was compliant with home exercise program for balance  Response to previous treatment:nauseous, but then felt good the next day  Functional change: denies dizziness    Pain: 0/10  Location: headache     Objective     Rogelio participated in neuromuscular re-education activities to improve: Balance and vestibular system for 18 minutes. The following activities were included:    // bar:    Foam, romberg, horizontal head turns 2 x 30 sec= min-mod sway   Foam, romberg, EC 2 x30 sec=min sway   Tandem stance on foam x 30 sec              Foam, romberg, vertical head turns [id number] min sway   Tandem, horizontal head turns 2 x 30 sec= min-mod sway   Tandem, firm, EC x 30 sec= poor to fair balance      Rogelio received therapeutic exercises to develop strength, flexibility, posture and core stabilization for 23  minutes including:  UBE L1 2/2    Sitting: Upper trapezius  Stretch 2 x 30 sec   Levator scapulae  Stretch 2 x 30 sec   Cervical isometrics for flexion and SB YTB 10x    Standing: wall slides with pillow case 3 x 10   Serratus stars YTB x10   Rowing YTB 2 x 10   scapular depression YTB 2 x 10       Home Exercises Provided and Patient Education Provided     Education provided:   - continue with HEP, advanced to include stretching and cervical isometrics    Written Home Exercises Provided: Patient instructed to cont prior HEP.  Exercises were reviewed and Rogelio was able to demonstrate them prior to the end of the session.  Rogelio demonstrated good  understanding of the education provided.     See EMR under Patient Instructions for exercises provided 12/29/2021.    Assessment   Rogelio tolerated PT session well. Pt denies dizziness and headache. Pt performed well with all therex and balance activities. Added deep cervical flexor strengthening with chin tucks and supine horizontal abduction for periscapular strengthening. Pt showed decreased sway with all balance activities. Slight semi tandem stance for balance to decrease sway.       Rogelio Is progressing well towards her goals.   Pt prognosis is Good.     Pt will continue to benefit from skilled outpatient physical therapy to address the deficits listed in the problem list box on initial evaluation, provide pt/family education and to maximize pt's level of independence in the home and community environment.     Pt's spiritual, cultural and educational needs considered and pt agreeable to plan of care and goals.     Anticipated barriers to physical therapy: history of prior vertigo    Goals:   Short Term Goals: 5 weeks   1. Pt will present with a (-) [place] Hallpike/ roll test to demonstrate a resolution of BPPV. ongoing  2. Pt will report decreased impact of dizziness on daily life to moderate by scoring 45/100 on DHI. ongoing  3. Assess FGA and set goals as needed. Met  1/4/22- 25/30  4. Pt will report decreased maximal cervical tightness to 5/10 to demonstrate improved muscle integrity for decreasing headache occurrence. ongoing  5. Pt will demonstrate ability to maintain a chin tuck for 20 sec to demonstrate improved endurance of postural muscles for decreased headaches and cervical tightness.  ongoing  6. Pt will demonstrate normal Visual Dynamic Acuity by performing with <4 line difference for improved tolerance to positional changes. ongoing     Long Term Goals: 10 weeks   1. Pt will perform HEP at least 3x/week for balance and cervical impairments to improve self management of sx. ongoing  2. Pt will report decreased impact of dizziness on daily life to minimal by scoring 20/100 on DHI. ongoing  3. Pt will deny significant dizziness associated with positional changes x 2 weeks to demonstrate improved ability to complete normal household tasks. ongoing  4. Pt will demonstrate improved cervical rotation to >65 deg to improve safety with driving. ongoing  5. Pt will report decreased headache occurrence to <25% of the time to demonstrate improved tolerance to daily mobility/ tasks. ongoing  6. Pt will perform romberg on foam with EC x 30 sec to demonstrate improvement of vestibular function for improved balance in dim environments. ongoing     Plan   Outpatient Physical Therapy 2 times weekly to include the following interventions: Cervical/Lumbar Traction, Manual Therapy, Moist Heat/ Ice, Neuromuscular Re-ed, Patient Education, Therapeutic Activities, Therapeutic Exercise and canalith repositioning manuevers.      Address balance with horizontal head turns and eyes closed. Perform cervical strengthening and stretching. perform activities to inhibit UT recruitment.      Rigoberto Tsang, PTA  1/12/2022

## 2022-01-13 PROBLEM — F43.23 ADJUSTMENT DISORDER WITH MIXED ANXIETY AND DEPRESSED MOOD: Status: ACTIVE | Noted: 2022-01-04

## 2022-01-13 NOTE — ASSESSMENT & PLAN NOTE
Assessment:  -increased depression and anxiety s/p concussion and associated restrictions/symptoms  -Will discuss increasing activity and reassuring her no lingering cognitive effects post-concussion  -likely negatively impacting sleep as well.   Plan:  Mental Health Follow-up · PCP: Will recommend she discuss medications optimizing.   · Therapy: Will also discuss brief problem focused therapy for anxiety/depression if needed since she has benefited from therapy in the past.

## 2022-01-13 NOTE — PROGRESS NOTES
Outpatient Neuropsychological Evaluation     Referral Information  Name: Rogelio Davila  MRN: [id number]  : 1960  Age: 61 y.o.  Gender: female  Referring Provider: Derik Tejada Iii, Md  [address] [facility] [address]  [place] [address]  Billing: See below for details as coding/billing has changed   The chief complaint leading to consultation/medical necessity is: Cognitive and mood concerns s/p concussion  Visit type:  Testing, report, treatment plan with initial visit on 2022    SUMMARY/TREATMENT PLAN   Results indicate the following diagnoses and treatment plan recommendations. This will be discussed in a separately scheduled feedback session.    Diagnoses/Plan:  Problem List Items Addressed This Visit        Psychiatric    Attention deficit hyperactivity disorder (ADHD), predominantly inattentive type    Current Assessment & Plan     Assessment:  -Cognitive Testing:  Most results were largely normal and revealed a very intelligent and high functioning individual.  Scores related to complex attention (sustaining attention, focus) were below expectations - even on her normal stimulant medication.   -Etiology/Cause/Severity:   1. Longstanding ADHD-Inattentive type is primarily causing her cognitive sxs  2. No evidence for lingering cognitive effects post-concussion, which is great news  3. Day to day cognitive symptom variability is more commonly due to her recently worse anxiety/depression, chronically variable sleep, and potentially medication effects (e.g., ?tolerance effects when on a stimulant for a long time).   Plan:  Neuropsychology Follow-up · Feedback session to review results/plan of care   · Will review various strategies to optimize attention/focus day to day  · No follow-up.  If Ms. Davila or others notice reductions in functioning, repeat evaluation is recommended.   Neurology Follow-up  Recommend revisiting activity restrictions, if not already done so   Reassure no cognitive  issues on testing related to concussion   Mental Health Follow-up · Psychiatry: Will recommend she discuss medication efficacy with psychiatry and potential for tolerance effects impacting efficacy.   · Therapy: Will also discuss behavioral treatments for ADHD         Recommendations for Ms. Davila and Caregivers/Family:   Brain Health: · Engage in regular exercise, which increases alertness and arousal and can improve attention and focus.    · Get a good nights sleep, as this can enhance alertness and cognition.  · Eat healthy foods and balanced meals. It is notable that research indicates certain nutrients may aid in brain function, such as B vitamins (especially B6, B12, and folic acid), antioxidants (such as vitamins C and E, and beta carotene), and Omega-3 fatty acids. Talk with your physician or nutritionist about whats right for you.   · Stay socially engaged. Continue staying active with your family and friends.   Sleep Tips · Poor sleep has a negative effect on cognition. Several strategies have been shown to improve sleep:   · Caffeine intake in the afternoon and evening, as well as stuffing oneself at supper, can decrease the quality of restful sleep throughout the night.   · Bedtime and wake-up times should be consistent every night and morning so the body becomes used to a single routine, even on the weekends.  · Engage in daily physical activity, but not 2-3 hours before bedtime.   · No technology use (television, computer, iPad) 1-2 hours before bed.   · Have a wind down routine (e.g., soft lights in the house, bath before bed, reduced fluid intake, songs, reading, less noise) to promote sleep readiness.   · Visit the www.sleepfoundation.org for more strategies.   Attention Tips · Remember that inattention and lack of focus are major culprits to forgetting information so be sure and practice paying attention for adequate learning of information. If you rely on passive attention to remembering  something (e.g., yeah, uh-huh approach), youll find you cannot recall it later. I recommend the following to improve attention, which may aid in later recall:   · Reduce distractions as much as possible.  · Look at the person as they are speaking to you.   · Paraphrase as they are speaking  · Write down important pieces of information   · Ask people to repeat if you zone out.    · Have visual cues  (posted to-do-list, daily schedule) to remind you if you need to do something later.   Processing Speed Tips · Using multiple modalities (e.g., listening, writing notes, asking questions, recording) to learn new information is likely to allow additional time for processing, thus improving memory for the material.   · Allowing sufficient time to complete tasks will reduce frustration and help to ensure completion.  · Spend a lot of up-front time planning in advance how long a task may take and then chunk steps in the task so you don't wear yourself out.   Executive Functioning Tips: · Dont attempt to multi-task.  Separate tasks so that each can be completed one at a time.  · Consider using a calendar/day planner, as that may be effective to help you plan and stay on track.  Color-coding specific tasks by importance may add additional benefit to your planner.  · Break down large projects into smaller tasks and write down the steps to completing the task.    Memory Tips- Learning something (initially reading something, talking to someone) · Rehearse - Immediately after seeing/hearing something, try to recall it.  Wait a few minutes, then check again.  Gradually lengthen the intervals between rehearsals.  · Repetition of learned material is critical to ensure storage of information to be learned. Self-test at home to ensure learning.  · Write down important information to improve your attention and focus and to have something to look back on when you need to recall it.  · Make sure the person doesnt rattle off, but  presents in a clear, logical, and unhurried manner.    Memory Tips - Remembering after a period of time · Jog your memory - Lose something?  Think back to when you last had it.  What did you do next?  And after that?  Mentally walk yourself through each activity that followed.  Prodding your memory this way may enable you to recall the location of the missing item.  · Use a cue - Symbolic reminders (the proverbial string around the finger) are helpful.  So too are memos, timers, calendar notes, etc.--keep them in visible, appropriate places.  · Get organized - Have fixed locations for all important papers, key phone numbers, medications, keys, wallet, glasses, tools, etc.  · Develop routines - Routines can anchor memories so they do not drift away.                   Adjustment disorder with mixed anxiety and depressed mood    Current Assessment & Plan     Assessment:  -increased depression and anxiety s/p concussion and associated restrictions/symptoms  -Will discuss increasing activity and reassuring her no lingering cognitive effects post-concussion  -likely negatively impacting sleep as well.   Plan:  Mental Health Follow-up · PCP: Will recommend she discuss medications optimizing.   · Therapy: Will also discuss brief problem focused therapy for anxiety/depression if needed since she has benefited from therapy in the past.                 Other Visit Diagnoses     Cognitive and neurobehavioral dysfunction following brain injury    -  Primary    Post-concussion syndrome        History of TIA (transient ischemic attack)                  HISTORY OF PRESENT ILLNESS AND CURRENT SYMPTOMS     Ms. Davila has active problems noted below.    Cognitive Symptoms:   Onset: Historically, ADHD just treated 3-years ago. Has noticed some waning stimulant effectiveness in the past year (predating concussion) with med changes to good effect.  In August 2021, she experienced fall and mild concussion.  Afterwards she noticed  "various concussion symptoms including cognitive slowing, increased attention trouble, and more forgetfulness.   Course: Some cognitive improvement over the past 5-months. Currently, she is 75% of her baseline and she particularly notices slowed processing speed and forgetfulness.  No significant day-to-day cognitive variability but worsening of vertigo and nausea do worsen cognitive symptoms.    Current Functional Status/Needs:  ADLs  Self-Care Eating Safety   Bathing: Independent  Bathroom: Independent  Other: Independent Independent     Instrumental IADLs:   Driving Medications/Health Household Finances   -Independent -Independent -Independent -Independent     Psychiatric/Behavioral Symptoms:  Mood:  Depression/Dysphoria Anxiety/Fearfulness Irritability   -Feels more depressed (flat, less interactive) and attributes to staying home and more restrictions 2/2 vertigo -"I'm an optimist" but more open later to acknowledging anxiety  -Currently, anxiety is quite significant but reported longstanding anxiety since young. Sxs are characterized by muscle tension, sleep trouble, some looping around triggering interpersonal interactions.  -None     Behavior:  Agitation/Resistance Delusions/Paranoia Hallucinations   None None None     Apathy/Motivation Repetitive/Restlessness Other   None None None     Neurovegetative:  Sleep/Nighttime  Appetite Energy   -"Off and on" attributes to neck tension long term  -Recently, gabapentin is helping somewhat Good Good with Adderall but notices she fatigues more     Suicidal/Homicidal Ideation: None reported    Physical Symptoms:   +Somatic anxiety symptoms (clenching, neck tightening) longstanding and worse post-concussion  +Vestibular symptoms post August 2021 concussion and "a lot better" with vestibular rehab     PERTINENT BACKGROUND INFORMATION   SOCIAL HISTORY  · Family Status:  in 2003 + 1-son (lives with her for the past 2-years) and 1-daughter  · Current Living " Situation: Home with adult son  · Primary Source of Support: Son  · Daily Activities: Walks her dog, socializing, but reduced in the past 5-6months 2/2 vertigo  · Stressors: Some family stressors, health,   · Other Factors:  · Educational Level: HS + 1-year  · Longstanding attention trouble but could compensate fairly well.   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 ) for 18-years    Family History   Problem Relation Age of Onset    Breast cancer Paternal Aunt 58    Cancer Maternal Grandfather         lung    Diabetes Maternal Grandfather     Stroke Father 62    Hypertension Father     Heart attacks under age 50 Father         smoker, drinker    Hypertension Mother     Melanoma Neg Hx     Ovarian cancer Neg Hx     Colon cancer Neg Hx      Family Neurologic History: Negative for heritable risk factors  Family Psychiatric History: Anxiety, Alcohol trouble (Father)    MEDICAL STATUS  Patient Active Problem List   Diagnosis    Obesity    Menopause present    BPPV (benign paroxysmal positional vertigo), left    Impaired functional mobility, balance, and endurance    Post-traumatic headache, not intractable    Decreased ROM of neck    Vestibulopathy of left ear    Attention deficit hyperactivity disorder (ADHD), predominantly inattentive type    Adjustment disorder with mixed anxiety and depressed mood     Past Medical History:   Diagnosis Date    Abnormal Pap smear of cervix     Hyperlipidemia      Past Surgical History:   Procedure Laterality Date     SECTION      x2    COLPOSCOPY         Updated/Relevant Neurologic History:  · Falls: Yes previously but none recently  · TBI: Yes, mild concussion 20 (see ED notes and Neurology follow-up)  · Seizures: None  · Stroke: None  · Movement Concerns: None      Recent Labs  No results found for: [id number]  No results found for: RPR  No results found for: FOLATE  Lab Results    Component Value Date    TSH 1.237 09/26/2018    C7UEVKK 113 12/06/2007    Y3NBTRL 6.7 12/06/2007     Lab Results   Component Value Date    HGBA1C 5.7 03/26/2011     No results found for: HIV1X2, [id number]    Imaging  Results for orders placed or performed during the hospital encounter of 12/30/21   MRI Brain Without Contrast    Narrative    EXAMINATION:  MRI BRAIN WITHOUT CONTRAST    CLINICAL HISTORY:  Dizziness, persistent/recurrent, cardiac or vascular cause suspected; Repeated falls    TECHNIQUE:  Multiplanar multisequence MR imaging of the brain was performed without contrast.    COMPARISON:  None.    FINDINGS:  Additional comment: Examination is mildly motion compromised    Parenchyma: There is no restricted diffusion to suggest acute or subacute ischemic infarct.Generalized pattern of age-related parenchymal volume loss is noted.  Scattered areas of nonspecific frontoparietal predominant T2/FLAIR hyperintense signal are nonspecific, but may relate to sequela of chronic small vessel ischemic disease.    Additional comments: There is no midline shift, abnormal extra-axial fluid collection, or acute intracranial hemorrhage. The basal cisterns are patent.    Ventricles: Normal.    Flow voids: The normal major intracranial arterial flow voids are visualized.    Sinuses and mastoid air cells: Essentially clear    Orbits: Normal    Midline structures: The pituitary and craniocervical junction are normal.    Marrow: Normal        Impression    1. No acute intracranial findings.  No definite etiology of the patient's symptoms identified.  2. Nonspecific areas of frontoparietal white matter signal abnormality could relate to sequelae of chronic small vessel ischemic disease.      Electronically signed by: Pancho Rosales  Date:    12/30/2021  Time:    08:35         Current Outpatient Medications:     ADDERALL XR 15 mg 24 hr capsule, Take 15 mg by mouth once daily. , Disp: , Rfl:     aspirin (ASPIRIN LOW DOSE) 81 MG  EC tablet, once daily., Disp: , Rfl:     calcium carbonate (CALCIUM 600) 600 mg calcium (1,500 mg) Tab, Take 600 mg by mouth once., Disp: , Rfl:     dextroamphetamine-amphetamine 10 mg Tab, Take 10 mg by mouth once daily. Pt states she takes in the afternoon, Disp: , Rfl:     escitalopram oxalate (LEXAPRO) 20 MG tablet, Take 20 mg by mouth., Disp: , Rfl:     estradiol 0.05 mg/24 hr td ptsw (VIVELLE-DOT) 0.05 mg/24 hr, Place 1 patch onto the skin twice a week., Disp: 8 patch, Rfl: 12    ezetimibe (ZETIA) 10 mg tablet, Take 1 tablet (10 mg total) by mouth once daily., Disp: 90 tablet, Rfl: 3    gabapentin (NEURONTIN) 300 MG capsule, Take 1 capsule (300 mg total) by mouth every evening., Disp: 30 capsule, Rfl: 3    hydrochlorothiazide (HYDRODIURIL) 25 MG tablet, Take 25 mg by mouth once daily. , Disp: , Rfl:     MULTIVITAMIN ORAL, Take by mouth., Disp: , Rfl:     naproxen (EC NAPROSYN) 500 MG EC tablet, TAKE 1 TABLET(500 MG) BY MOUTH TWICE DAILY WITH MEALS, Disp: 60 tablet, Rfl: 0    progesterone (PROMETRIUM) 200 MG capsule, Take 1 capsule (200 mg total) by mouth nightly., Disp: 30 capsule, Rfl: 12    Updated/Relevant Psychiatric History:  +Adulthood:   ++Symptoms: Longstanding ADHD and anxiety but no treatment until later in adulthood  ++Treatment:  2003: Some grief counseling after     2019-Current: Diagnosedwith ADHD and prescribed Adderall (Current: 15mg XR and 10mg in afternoon)  2020-Current: Started lexapro for depression and anxiety by PCP  Substance Use: Occasional etoh use    MENTAL STATUS AND OBSERVATIONS:  APPEARANCE: Casually dressed and adequate grooming/hygiene.   ALERTNESS/ORIENTATION: Attentive and alert. Fully oriented (x5) to time and place  GAIT:  Unremarkable  MOTOR MOVEMENTS/MANNERISMS:  Unremarkable  SPEECH/LANGUAGE: Normal in rate, rhythm, tone, and volume. No significant word finding difficulty noted. Expressive and receptive language was normal.  STATED MOOD/AFFECT: The  "patients stated mood was " anxious" Affect was congruent with stated mood but relaxed as testing progressed  INTERPERSONAL BEHAVIOR: Rapport was quickly and easily established   SUICIDALITY/HOMICIDALITY: None reported  HALLUCINATIONS/DELUSIONS: None evidenced or endorsed  THOUGHT PROCESSES/INSIGHT: Thoughts seemed logical and goal-directed.   TEST TAKING BEHAVIOR and VALIDITY: Freestanding and embedded performance validity measures and observation of effort were suggestive of adequate engagement. The current results, therefore, are likely a valid reflection of the patient's current functioning.     PROCEDURES/TESTS ADMINISTERED   In addition to performing a review of pertinent medical records, reviewing limits to confidentiality, conducting a clinical interview, and explaining procedures, the following measures were administered: [employer] (ACS) Test of Pre-Morbid Functioning (TOPF), Green's MSVT, Wechsler Adult Intelligence Scale-Fourth Edition (WAIS-IV Digit Span, Arithmetic, Symbol Search, Coding Matrix Reasoning and Similarities), Trail Making Test (TMT-A&B; Bandar et al., 2004), Verbal Fluency Test(FAS/Animals; Bandar et al., 2004), NAB Naming Test, Brennan Complex Figure Copy Trial(Brennan CFT), [place] Verbal Learning Test-Second Edition (CVLT-2), Wechsler Memory Scale-Fourth Edition (WMS-IV) Logical Memory and Visual Reproduction subtests, CPT-3 Grooved Pegboard (GPT; Bandar et al., 2004), SUN-7/PHQ-9, Personality Assessment Inventory (BETTY)Manual norms were used unless otherwise indicated.  Review data Appendix Below for scores and percentiles.     BILLING     Service Description CPT Code Minutes Units   Test Evaluation Services   Neuropsychological testing evaluation services by physician [phone]   Each additional hour by physician [phone]   Test Administration and Scoring   Psychological or neuropsychological test administration and scoring by technician [phone]   Each additional " 30 minutes by technician [phone]         DATA APPENDIX:   Note: It is important to note that scores/percentiles should only be interpreted by a neuropsychologist. It is common for healthy individuals to have 1-3 isolated low/unusual scores that are not indicative of any significant cognitive dysfunction.      Raw Score Type of Standardized Score Standardized Score   MSVT  - -   MSVT  - -   MSVT Cons 100 - -   ACS LM II Rec 25 - -   ACS VR II Rec 6 - -   ACS RDS 12 - -   CVLT-II FC 16 - -   PREMORBID FUNCTIONING Raw Score Type of Standardized Score Standardized Score   TOPF simple dem. eFSIQ -    TOPF pred. eFSIQ -    TOPF simple + pred. eFSIQ -    INTELLECTUAL FUNCTIONING Raw Score Type of Standardized Score Standardized Score   WAIS-IV      WMI -    PSI -    Similarities 27 ss 11   Matrix Reasoning 10 ss 7   Digit Span 32 ss 13         DS Forward 11 ss 11         DS Backward 12 ss 14         DS Sequence 9 ss 11         Longest Digit Forward 7 - -         Longest Digit Backward 7 - -         Longest Digit Sequence 6 - -   Arithmetic 14 ss 10   Symbol Search 29 ss 10   Coding 69 ss 12   LANGUAGE FUNCTIONING Raw Score Type of Standardized Score Standardized Score   WAIS-IV Similarities 27 ss 11   TOPF Word Reading 56    NAB Naming 30 Tscore 55   FAS 48 Tscore 55   Animal Naming 19 Tscore 49   VISUOSPATIAL FUNCTIONING Raw Score Type of Standardized Score Standardized Score   WAIS-IV Matrix Reasoning 10 ss 7   RCFT Copy 29.5 - -   RCFT Time to Copy 112 - -   LEARNING & MEMORY Raw Score Type of Standardized Score Standardized Score   CVLT-II      Trials 1-5 (T-Score) 55 Tscore 59   List A Trial 1 6 zscore 0   List A Trial 5 15 zscore 1   List B 3 zscore -1.5   SDFR 13 zscore 1   SDCR 13 zscore 0.5   LDFR 14 zscore 1   LDCR 12 zscore 0   Semantic Clustering 3 zscore 2   Learning Millard 2.5 zscore 2   Repetitions 9 zscore 1   Intrusions 10 zscore 1   Recognition Hits 15  zscore 0   False Positives 4 zscore 1   Discriminability 2.7 zscore -0.5   WMS-IV      Auditory Immediate (additional score) -    Auditory Delayed (additional score) -    Auditory Memory -    WMS-IV Subtests      LM I 32 ss 13   LM II 28 ss 13   LM Recognition 25 - -   (CVLT-II Trials 1-5) 59  12   (CVLT-II Long Delay) 1  13   VR I 31 ss 9   VR II 32 ss 13   VR II Recognition 6 - -   VR Copy 42 - -   ATTENTION/WORKING MEMORY Raw Score Type of Standardized Score Standardized Score   WAIS-IV WMI -    WAIS-IV Digit Span 32 ss 13         DS Forward 11 ss 11         DS Backward 12 ss 14         DS Sequence 9 ss 11         Longest Digit Forward 7 - -         Longest Digit Backward 7 - -         Longest Digit Sequence 6 - -   WAIS-IV Arithmetic 14 ss 10   Orly CPT-3 Detectability N/A Tscore 30   Orly CPT-3 Omissions N/A Tscore 44   [place]ly CPT-3 Comissions N/A Tscore 36   [place]ly CPT-3 Perseverations N/A Tscore 47   Orly CPT-3 HRT N/A Tscore 70   [place]ly CPT-3 HRT SD N/A Tscore 46   [place] CPT-3 Variability  N/A Tscore 43   Orly CPT-3 HRT Block Change N/A Tscore 50   Orly CPT-3 HRT LEONILA Change N/A Tscore 63   MENTAL PROCESSING SPEED Raw Score Type of Standardized Score Standardized Score   WAIS-IV PSI -    WAIS-IV Symbol Search 29 ss 10   WAIS-IV Coding 69 ss 12   TMT A  29 Tscore 49   TMT A errors 0 - -   EXECUTIVE FUNCTIONING Raw Score Type of Standardized Score Standardized Score   TMT B 70 Tscore 51   TMT B errors 0 - -   WAIS-IV Similarities 27 ss 11   WAIS-IV Matrix Reasoning 10 ss 7   FRONTOMOTOR  Raw Score Type of Standardized Score Standardized Score   GPT DH 78 Tscore 43   GPD NDH 84 Tscore 44   MOOD & PERSONALITY Raw Score Type of Standardized Score Standardized Score   PHQ-9 11 - -   SUN-7 5 - -   BETTY      ICN 7 Tscore 55   INF 2 Tscore 47   NIM 1 Tscore 47   PIM 11 Tscore 41   FAVIAN 18 Tscore 57   ANX 17 Tscore 51   SARATH 19 Tscore 49   DEP 29 Tscore 66   MAN 10 Tscore  36   PAR 8 Tscore 38   SCZ 13 Tscore 49   BOR 15 Tscore 47   ANT 4 Tscore 40   ALC 3 Tscore 47   DRG 0 Tscore 42   AGG 8 Tscore 42   ESME 8 Tscore 43   STR 3 Tscore 44   NON 1 Tscore 39   RXR 12 Tscore 46   DOM 18 Tscore 45   WRM 24 Tscore 51

## 2022-01-13 NOTE — ASSESSMENT & PLAN NOTE
Assessment:  -Cognitive Testing:  Most results were largely normal and revealed a very intelligent and high functioning individual.  Scores related to complex attention (sustaining attention, focus) were below expectations - even on her normal stimulant medication.   -Etiology/Cause/Severity:   1. Longstanding ADHD-Inattentive type is primarily causing her cognitive sxs  2. No evidence for lingering cognitive effects post-concussion, which is great news  3. Day to day cognitive symptom variability is more commonly due to her recently worse anxiety/depression, chronically variable sleep, and potentially medication effects (e.g., ?tolerance effects when on a stimulant for a long time).   Plan:  Neuropsychology Follow-up · Feedback session to review results/plan of care   · Will review various strategies to optimize attention/focus day to day  · No follow-up.  If Ms. Davila or others notice reductions in functioning, repeat evaluation is recommended.   Neurology Follow-up  Recommend revisiting activity restrictions, if not already done so   Reassure no cognitive issues on testing related to concussion   Mental Health Follow-up · Psychiatry: Will recommend she discuss medication efficacy with psychiatry and potential for tolerance effects impacting efficacy.   · Therapy: Will also discuss behavioral treatments for ADHD         Recommendations for Ms. Davila and Caregivers/Family:   Brain Health: · Engage in regular exercise, which increases alertness and arousal and can improve attention and focus.    · Get a good nights sleep, as this can enhance alertness and cognition.  · Eat healthy foods and balanced meals. It is notable that research indicates certain nutrients may aid in brain function, such as B vitamins (especially B6, B12, and folic acid), antioxidants (such as vitamins C and E, and beta carotene), and Omega-3 fatty acids. Talk with your physician or nutritionist about whats right for you.   · Stay  socially engaged. Continue staying active with your family and friends.   Sleep Tips · Poor sleep has a negative effect on cognition. Several strategies have been shown to improve sleep:   · Caffeine intake in the afternoon and evening, as well as stuffing oneself at supper, can decrease the quality of restful sleep throughout the night.   · Bedtime and wake-up times should be consistent every night and morning so the body becomes used to a single routine, even on the weekends.  · Engage in daily physical activity, but not 2-3 hours before bedtime.   · No technology use (television, computer, iPad) 1-2 hours before bed.   · Have a wind down routine (e.g., soft lights in the house, bath before bed, reduced fluid intake, songs, reading, less noise) to promote sleep readiness.   · Visit the www.sleepfoundation.org for more strategies.   Attention Tips · Remember that inattention and lack of focus are major culprits to forgetting information so be sure and practice paying attention for adequate learning of information. If you rely on passive attention to remembering something (e.g., yeah, uh-huh approach), youll find you cannot recall it later. I recommend the following to improve attention, which may aid in later recall:   · Reduce distractions as much as possible.  · Look at the person as they are speaking to you.   · Paraphrase as they are speaking  · Write down important pieces of information   · Ask people to repeat if you zone out.    · Have visual cues  (posted to-do-list, daily schedule) to remind you if you need to do something later.   Processing Speed Tips · Using multiple modalities (e.g., listening, writing notes, asking questions, recording) to learn new information is likely to allow additional time for processing, thus improving memory for the material.   · Allowing sufficient time to complete tasks will reduce frustration and help to ensure completion.  · Spend a lot of up-front time planning in  advance how long a task may take and then chunk steps in the task so you don't wear yourself out.   Executive Functioning Tips: · Dont attempt to multi-task.  Separate tasks so that each can be completed one at a time.  · Consider using a calendar/day planner, as that may be effective to help you plan and stay on track.  Color-coding specific tasks by importance may add additional benefit to your planner.  · Break down large projects into smaller tasks and write down the steps to completing the task.    Memory Tips- Learning something (initially reading something, talking to someone) · Rehearse - Immediately after seeing/hearing something, try to recall it.  Wait a few minutes, then check again.  Gradually lengthen the intervals between rehearsals.  · Repetition of learned material is critical to ensure storage of information to be learned. Self-test at home to ensure learning.  · Write down important information to improve your attention and focus and to have something to look back on when you need to recall it.  · Make sure the person doesnt rattle off, but presents in a clear, logical, and unhurried manner.    Memory Tips - Remembering after a period of time · Jog your memory - Lose something?  Think back to when you last had it.  What did you do next?  And after that?  Mentally walk yourself through each activity that followed.  Prodding your memory this way may enable you to recall the location of the missing item.  · Use a cue - Symbolic reminders (the proverbial string around the finger) are helpful.  So too are memos, timers, calendar notes, etc.--keep them in visible, appropriate places.  · Get organized - Have fixed locations for all important papers, key phone numbers, medications, keys, wallet, glasses, tools, etc.  · Develop routines - Routines can anchor memories so they do not drift away.

## 2022-01-14 ENCOUNTER — DOCUMENTATION ONLY (OUTPATIENT)
Dept: REHABILITATION | Facility: HOSPITAL | Age: 62
End: 2022-01-14
Payer: COMMERCIAL

## 2022-01-14 NOTE — PROGRESS NOTES
Missed Visit/Cancellation      Date: 1/14/2022         Canceled Number: 1  No Show Number: 0                                                                                                                Pt initially had visit scheduled for today for 0915.   Reason for cancellation: not feeling well.    Pt's next scheduled physical therapy visit is 1/19/22.    Therese Padilla, PT, DPT  1/14/2022

## 2022-01-19 ENCOUNTER — CLINICAL SUPPORT (OUTPATIENT)
Dept: REHABILITATION | Facility: HOSPITAL | Age: 62
End: 2022-01-19
Attending: PSYCHIATRY & NEUROLOGY
Payer: COMMERCIAL

## 2022-01-19 ENCOUNTER — TELEPHONE (OUTPATIENT)
Dept: OBSTETRICS AND GYNECOLOGY | Facility: CLINIC | Age: 62
End: 2022-01-19
Payer: COMMERCIAL

## 2022-01-19 DIAGNOSIS — H81.92 VESTIBULOPATHY OF LEFT EAR: ICD-10-CM

## 2022-01-19 DIAGNOSIS — H81.12 BPPV (BENIGN PAROXYSMAL POSITIONAL VERTIGO), LEFT: ICD-10-CM

## 2022-01-19 DIAGNOSIS — G44.309 POST-TRAUMATIC HEADACHE, NOT INTRACTABLE, UNSPECIFIED CHRONICITY PATTERN: ICD-10-CM

## 2022-01-19 DIAGNOSIS — R29.898 DECREASED ROM OF NECK: ICD-10-CM

## 2022-01-19 DIAGNOSIS — Z74.09 IMPAIRED FUNCTIONAL MOBILITY, BALANCE, AND ENDURANCE: ICD-10-CM

## 2022-01-19 PROCEDURE — 97112 NEUROMUSCULAR REEDUCATION: CPT | Mod: PN | Performed by: PHYSICAL THERAPIST

## 2022-01-19 NOTE — PROGRESS NOTES
See POC for PT reassessment    Therese Padilla, PT, DPT, CBIS  Board-Certified Clinical Specialist in Neurologic Physical Therapy    1/19/2022

## 2022-01-19 NOTE — TELEPHONE ENCOUNTER
Patient was contacted in regards to her Rx requested. Patient was told that she can can 2 months worth of refills until she comes in for next appointment.  Also, was instructed to call and schedule an appointment with Dr. Brewster at her next avalibilty.    SHERRIE Fraser

## 2022-01-21 ENCOUNTER — CLINICAL SUPPORT (OUTPATIENT)
Dept: REHABILITATION | Facility: HOSPITAL | Age: 62
End: 2022-01-21
Attending: PSYCHIATRY & NEUROLOGY
Payer: COMMERCIAL

## 2022-01-21 DIAGNOSIS — Z74.09 IMPAIRED FUNCTIONAL MOBILITY, BALANCE, AND ENDURANCE: ICD-10-CM

## 2022-01-21 DIAGNOSIS — R29.898 DECREASED ROM OF NECK: ICD-10-CM

## 2022-01-21 DIAGNOSIS — H81.12 BPPV (BENIGN PAROXYSMAL POSITIONAL VERTIGO), LEFT: Primary | ICD-10-CM

## 2022-01-21 DIAGNOSIS — H81.92 VESTIBULOPATHY OF LEFT EAR: ICD-10-CM

## 2022-01-21 DIAGNOSIS — G44.319 ACUTE POST-TRAUMATIC HEADACHE, NOT INTRACTABLE: ICD-10-CM

## 2022-01-21 PROCEDURE — 97110 THERAPEUTIC EXERCISES: CPT | Mod: PN,CQ

## 2022-01-21 PROCEDURE — 97112 NEUROMUSCULAR REEDUCATION: CPT | Mod: PN,CQ
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
## 2022-01-21 NOTE — PROGRESS NOTES
Physical Therapy Daily Treatment Note     Name: Rogelio Davila  Clinic Number: [id number]    Therapy Diagnosis:   Encounter Diagnoses   Name Primary?    BPPV (benign paroxysmal positional vertigo), left Yes    Impaired functional mobility, balance, and endurance     Acute post-traumatic headache, not intractable     Decreased ROM of neck     Vestibulopathy of left ear      Physician: Derik Tejada III, MD    Visit Date: 1/21/2022    Physician Orders: Derik Tejada III, MD     Physician Orders: PT Eval and Treat vestibular rehab, neck  Medical Diagnosis from Referral:   S13.4XXA (ICD-10-CM) - Whiplash, initial encounter   H81.92 (ICD-10-CM) - Vestibulopathy of left ear   F07.81 (ICD-10-CM) - Post-concussion syndrome      Evaluation Date: 12/23/2021  Authorization Period Expiration: 12/31/2022  Plan of Care Expiration: 2/18/2022  Visit # / Visits authorized: 4/ 20 (+ eval)     Time In: 1020  Time Out: 1100  Total Billable Time: 40 minutes    Precautions: Standard    Subjective     Pt reports:feeling a little dizzy. Feeling like its going to go into vertigo but I does not.  She was compliant with home exercise program for balance  Response to previous treatment:nauseous, but then felt good the next day  Functional change: denies dizziness    Pain: 0/10  Location: headache     Objective     Rogelio participated in neuromuscular re-education activities to improve: Balance and vestibular system for 18 minutes. The following activities were included:    // bar:    Foam, romberg, horizontal head turns 2 x 30 sec= min sway   Foam, romberg, EC 2 x30 sec=min- sway   Tandem stance on foam x 30 sec              Foam, romberg, vertical head turns [id number] min- sway   Tandem, horizontal head turns 2 x 30 sec= min-mod sway   Tandem, firm, EC x 30 sec= poor to fair balance      Rogelio received therapeutic exercises to develop strength, flexibility, posture and core stabilization for 23 minutes including:  UBE L1 2/2    Sitting:  Upper trapezius  Stretch 2 x 30 sec   Levator scapulae  Stretch 2 x 30 sec   Cervical isometrics for flexion and SB YTB 10x    Standing: wall slides with pillow case 3 x 10   Serratus stars YTB x10   Rowing YTB 2 x 10   scapular depression YTB 2 x 10       Home Exercises Provided and Patient Education Provided     Education provided:   - continue with HEP, advanced to include stretching and cervical isometrics    Written Home Exercises Provided: Patient instructed to cont prior HEP.  Exercises were reviewed and Rogelio was able to demonstrate them prior to the end of the session.  Rogelio demonstrated good  understanding of the education provided.     See EMR under Patient Instructions for exercises provided 12/29/2021.    Assessment   Rogelio tolerated PT session well. Pt reports slight dizziness entering clinic. Dizziness is not constant. Pt did well with all therex. No increase in pain or symptoms. Most difficulty with tandem gait. Decreased sway with with all other balance activities       Rogelio Is progressing well towards her goals.   Pt prognosis is Good.     Pt will continue to benefit from skilled outpatient physical therapy to address the deficits listed in the problem list box on initial evaluation, provide pt/family education and to maximize pt's level of independence in the home and community environment.     Pt's spiritual, cultural and educational needs considered and pt agreeable to plan of care and goals.     Anticipated barriers to physical therapy: history of prior vertigo    Goals:   Short Term Goals: 5 weeks   1. Pt will present with a (-) Sophia Hallpike/ roll test to demonstrate a resolution of BPPV. Met 1/19/22  2. Pt will report decreased impact of dizziness on daily life to moderate by scoring 45/100 on DHI. Met 1/19/22  3. Assess FGA and set goals as needed. Met 1/4/22- 25/30  4. Pt will report decreased maximal cervical tightness to 5/10 to demonstrate improved muscle integrity for decreasing  headache occurrence. ongoing  5. Pt will demonstrate ability to maintain a chin tuck for 20 sec to demonstrate improved endurance of postural muscles for decreased headaches and cervical tightness.  ongoing  6. Pt will demonstrate normal Visual Dynamic Acuity by performing with <4 line difference for improved tolerance to positional changes. Met 1/19/22     Long Term Goals: 10 weeks   1. Pt will perform HEP at least 3x/week for balance and cervical impairments to improve self management of sx. ongoing  2. Pt will report decreased impact of dizziness on daily life to minimal by scoring 20/100 on DHI. Met 1/19/22  3. Pt will deny significant dizziness associated with positional changes x 2 weeks to demonstrate improved ability to complete normal household tasks. ongoing  4. Pt will demonstrate improved cervical rotation to >65 deg to improve safety with driving. ongoing  5. Pt will report decreased headache occurrence to <25% of the time to demonstrate improved tolerance to daily mobility/ tasks. Met 1/19/22- 0%  6. Pt will perform romberg on foam with EC x 30 sec to demonstrate improvement of vestibular function for improved balance in dim environments. Met 1/19/22  7. New 1/19/22: Pt will demonstrate improved vestibular function to improve functional gait in the community and in varying environments by scoring 28/30 on FGA.     Plan   Outpatient Physical Therapy 2 times weekly to include the following interventions: Cervical/Lumbar Traction, Manual Therapy, Moist Heat/ Ice, Neuromuscular Re-ed, Patient Education, Therapeutic Activities, Therapeutic Exercise and canalith repositioning manuevers.      Instruct in habituation exercises for sit<>supine. Address balance with horizontal head turns and eyes closed. Tandem gait. Perform cervical strengthening and stretching.       Rigoberto Tsang, PTA  1/21/2022

## 2022-01-24 ENCOUNTER — CLINICAL SUPPORT (OUTPATIENT)
Dept: REHABILITATION | Facility: HOSPITAL | Age: 62
End: 2022-01-24
Attending: PSYCHIATRY & NEUROLOGY
Payer: COMMERCIAL

## 2022-01-24 DIAGNOSIS — Z74.09 IMPAIRED FUNCTIONAL MOBILITY, BALANCE, AND ENDURANCE: ICD-10-CM

## 2022-01-24 DIAGNOSIS — G44.309 POST-TRAUMATIC HEADACHE, NOT INTRACTABLE, UNSPECIFIED CHRONICITY PATTERN: ICD-10-CM

## 2022-01-24 DIAGNOSIS — H81.92 VESTIBULOPATHY OF LEFT EAR: ICD-10-CM

## 2022-01-24 DIAGNOSIS — R29.898 DECREASED ROM OF NECK: ICD-10-CM

## 2022-01-24 DIAGNOSIS — H81.12 BPPV (BENIGN PAROXYSMAL POSITIONAL VERTIGO), LEFT: ICD-10-CM

## 2022-01-24 PROCEDURE — 97112 NEUROMUSCULAR REEDUCATION: CPT | Mod: PN,59 | Performed by: PHYSICAL THERAPIST

## 2022-01-24 PROCEDURE — 97110 THERAPEUTIC EXERCISES: CPT | Mod: PN,59 | Performed by: PHYSICAL THERAPIST

## 2022-01-24 PROCEDURE — 95992 CANALITH REPOSITIONING PROC: CPT | Mod: PN | Performed by: PHYSICAL THERAPIST

## 2022-01-24 NOTE — PROGRESS NOTES
Physical Therapy Daily Treatment Note     Name: Rogelio Davila  Clinic Number: [id number]    Therapy Diagnosis:   Encounter Diagnoses   Name Primary?    BPPV (benign paroxysmal positional vertigo), left     Impaired functional mobility, balance, and endurance     Post-traumatic headache, not intractable, unspecified chronicity pattern     Decreased ROM of neck     Vestibulopathy of left ear      Physician: Derik Tejada III, MD    Visit Date: 1/24/2022    Physician Orders: Derik Tejada III, MD     Physician Orders: PT Eval and Treat vestibular rehab, neck  Medical Diagnosis from Referral:   S13.4XXA (ICD-10-CM) - Whiplash, initial encounter   H81.92 (ICD-10-CM) - Vestibulopathy of left ear   F07.81 (ICD-10-CM) - Post-concussion syndrome      Evaluation Date: 12/23/2021  Authorization Period Expiration: 12/31/2022  Plan of Care Expiration: 2/18/2022  Visit # / Visits authorized: 6/ 20 (+ eval)     Time In: 1006 (pt late)  Time Out: 1045  Total Billable Time: 39 minutes    Precautions: Standard    Subjective     Pt reports: felt dizzy all weekend  She was compliant with home exercise program when she is not dizzy  Response to previous treatment:felt okay, a little dizzy during the day after I left  Functional change: ongoing    Pain: 5/10, 2-3/10 at conclusion of session  Location: headache     Objective     Rogelio participated in neuromuscular re-education activities to improve: Balance and vestibular system for 15 minutes. The following activities were included:  Attempted Adryan Marie to R- pt reported vertigo  alfred Hallpike: L (-), R (+) w/ ~15 sec delay, 10 sec nystagmus, dizziness reported    Verbally instructed in Adryan Marie    // bar:    Foam, romberg, EC 2 x30 sec=min- sway   Tandem stance on foam 2 x 30 sec              Tandem, firm, EC 2 x 30 sec= poor to fair balance    Rogelio received canalith repositioning maneuver for R ear x 2- no dizziness with 2nd attmpt    Rogelio received therapeutic  "exercises to develop strength, flexibility, posture and core stabilization for 23 minutes including:  UBE L1 2/2    Standing: wall slides with pillow case 3 x 10   Serratus stars YTB x10   [place] 2 x 10   Rowing YTB 2 x 10   scapular depression YTB 2 x 10  Sitting: Upper trapezius stretch 2 x 30 sec   Levator scapulae stretch x 30 sec       Home Exercises Provided and Patient Education Provided     Education provided:   - continue with HEP  - Adryan Doroff to R side after 2 days, perform x 1 week, stop if dizziness worsens    Written Home Exercises Provided: Patient instructed to cont prior HEP.  Exercises were reviewed and Rogelio was able to demonstrate them prior to the end of the session.  Rogelio demonstrated good  understanding of the education provided.     See EMR under Patient Instructions for exercises provided 12/29/2021.    Assessment   Rogelio tolerated PT session well. Pt reported that she was dizzy all weekend but it did not feel like "vertigo". Dizziness did limit her normal activities. Pt also reported a moderate headache at beginning of session. PT began instructing pt in Adryan Marie to add to HEP. Pt reported significant dizziness with first attempt to R side. Prosperity Hallpike was reassessed with pt demonstrating a positive test with R head hanging. There was a significant delay prior to the onset of nystagmus. PT treated with Eply Maneuver x 2 for the R ear. Pt denied dizziness and no nystagmus was noted upon second attempt. Pt performed cervical stretching and periscapular strengthening during today's session. Head turns during balance interventions were limited due to recent canalith repositioning. Pt reported decreased headache to mild at conclusion of session. Pt was verbally instructed in Adryan Marie with R side lying to begin 2 days after session to prevent dizziness from returning. Pt can benefit from conitnued skilled PT to address vestibular and cervical impairments to return to prior level of " function.       Rogelio Is progressing well towards her goals.   Pt prognosis is Good.     Pt will continue to benefit from skilled outpatient physical therapy to address the deficits listed in the problem list box on initial evaluation, provide pt/family education and to maximize pt's level of independence in the home and community environment.     Pt's spiritual, cultural and educational needs considered and pt agreeable to plan of care and goals.     Anticipated barriers to physical therapy: history of prior vertigo    Goals:   formal status as of 1/19/22  Short Term Goals: 5 weeks   1. Pt will present with a (-) Eduardo Hallpike/ roll test to demonstrate a resolution of BPPV. Met 1/19/22  2. Pt will report decreased impact of dizziness on daily life to moderate by scoring 45/100 on DHI. Met 1/19/22  3. Assess FGA and set goals as needed. Met 1/4/22- 25/30  4. Pt will report decreased maximal cervical tightness to 5/10 to demonstrate improved muscle integrity for decreasing headache occurrence. ongoing  5. Pt will demonstrate ability to maintain a chin tuck for 20 sec to demonstrate improved endurance of postural muscles for decreased headaches and cervical tightness.  ongoing  6. Pt will demonstrate normal Visual Dynamic Acuity by performing with <4 line difference for improved tolerance to positional changes. Met 1/19/22     Long Term Goals: 10 weeks   1. Pt will perform HEP at least 3x/week for balance and cervical impairments to improve self management of sx. ongoing  2. Pt will report decreased impact of dizziness on daily life to minimal by scoring 20/100 on DHI. Met 1/19/22  3. Pt will deny significant dizziness associated with positional changes x 2 weeks to demonstrate improved ability to complete normal household tasks. ongoing  4. Pt will demonstrate improved cervical rotation to >65 deg to improve safety with driving. ongoing  5. Pt will report decreased headache occurrence to <25% of the time to  demonstrate improved tolerance to daily mobility/ tasks. Met 1/19/22- 0%  6. Pt will perform romberg on foam with EC x 30 sec to demonstrate improvement of vestibular function for improved balance in dim environments. Met 1/19/22  7. New 1/19/22: Pt will demonstrate improved vestibular function to improve functional gait in the community and in varying environments by scoring 28/30 on FGA.     Plan   Outpatient Physical Therapy 2 times weekly to include the following interventions: Cervical/Lumbar Traction, Manual Therapy, Moist Heat/ Ice, Neuromuscular Re-ed, Patient Education, Therapeutic Activities, Therapeutic Exercise and canalith repositioning manuevers.      Reassess for BPPV as needed.  Address balance with horizontal head turns and eyes closed. Tandem gait. Perform cervical strengthening and stretching.       Therese Padilla, PT, DPT, CBIS  Board-Certified Clinical Specialist in Neurologic Physical Therapy    1/24/2022

## 2022-01-27 ENCOUNTER — OFFICE VISIT (OUTPATIENT)
Dept: NEUROLOGY | Facility: CLINIC | Age: 62
End: 2022-01-27
Payer: COMMERCIAL

## 2022-01-27 ENCOUNTER — CLINICAL SUPPORT (OUTPATIENT)
Dept: REHABILITATION | Facility: HOSPITAL | Age: 62
End: 2022-01-27
Attending: PSYCHIATRY & NEUROLOGY
Payer: COMMERCIAL

## 2022-01-27 DIAGNOSIS — G44.309 POST-TRAUMATIC HEADACHE, NOT INTRACTABLE, UNSPECIFIED CHRONICITY PATTERN: ICD-10-CM

## 2022-01-27 DIAGNOSIS — Z74.09 IMPAIRED FUNCTIONAL MOBILITY, BALANCE, AND ENDURANCE: ICD-10-CM

## 2022-01-27 DIAGNOSIS — R29.898 DECREASED ROM OF NECK: ICD-10-CM

## 2022-01-27 DIAGNOSIS — H81.92 VESTIBULOPATHY OF LEFT EAR: ICD-10-CM

## 2022-01-27 DIAGNOSIS — F90.0 ATTENTION DEFICIT HYPERACTIVITY DISORDER (ADHD), PREDOMINANTLY INATTENTIVE TYPE: Primary | ICD-10-CM

## 2022-01-27 DIAGNOSIS — F43.23 ADJUSTMENT DISORDER WITH MIXED ANXIETY AND DEPRESSED MOOD: ICD-10-CM

## 2022-01-27 DIAGNOSIS — H81.12 BPPV (BENIGN PAROXYSMAL POSITIONAL VERTIGO), LEFT: ICD-10-CM

## 2022-01-27 PROCEDURE — 99499 UNLISTED E&M SERVICE: CPT | Mod: GT,95,, | Performed by: CLINICAL NEUROPSYCHOLOGIST

## 2022-01-27 PROCEDURE — 97110 THERAPEUTIC EXERCISES: CPT | Mod: PN | Performed by: PHYSICAL THERAPIST

## 2022-01-27 PROCEDURE — 99499 NO LOS: ICD-10-PCS | Mod: GT,95,, | Performed by: CLINICAL NEUROPSYCHOLOGIST

## 2022-01-27 PROCEDURE — 97112 NEUROMUSCULAR REEDUCATION: CPT | Mod: PN | Performed by: PHYSICAL THERAPIST

## 2022-01-27 NOTE — PROGRESS NOTES
Physical Therapy Daily Treatment Note     Name: Rogelio Davila  Clinic Number: [id number]    Therapy Diagnosis:   Encounter Diagnoses   Name Primary?    BPPV (benign paroxysmal positional vertigo), left     Impaired functional mobility, balance, and endurance     Post-traumatic headache, not intractable, unspecified chronicity pattern     Decreased ROM of neck     Vestibulopathy of left ear      Physician: Derik Tejada III, MD    Visit Date: 1/27/2022    Physician Orders: Derik Tejada III, MD     Physician Orders: PT Eval and Treat vestibular rehab, neck  Medical Diagnosis from Referral:   S13.4XXA (ICD-10-CM) - Whiplash, initial encounter   H81.92 (ICD-10-CM) - Vestibulopathy of left ear   F07.81 (ICD-10-CM) - Post-concussion syndrome      Evaluation Date: 12/23/2021  Authorization Period Expiration: 12/31/2022  Plan of Care Expiration: 2/18/2022  Visit # / Visits authorized: 7/ 20 (+ eval)     Time In: 0952 (pt late)  Time Out: 1030  Total Billable Time: 38 minutes    Precautions: Standard    Subjective     Pt reports: no dizziness since last session  She was compliant with home exercise program   Response to previous treatment: dizziness resolved, felt much better  Functional change: no dizziness    Pain: 0/10   Location: NA     Objective     Rogelio participated in neuromuscular re-education activities to improve: Balance and vestibular system for 15 minutes. The following activities were included:    // bar:    Foam, romberg, EC 2 x30 sec=min sway   Tandem stance on foam 2 x 30 sec= min sway              Tandem, firm, EC 2 x 30 sec= fair+ balance   Tandem, firm, horzontal head turns2 x 30 sec= fair+ balance     Tandem gait- ~15 ft x 4      Rogelio received therapeutic exercises to develop strength, flexibility, posture and core stabilization for 23 minutes including:  UBE L1 3 min    Standing: wall slides with YTB 2 x 10   Serratus stars YTB x10   Chin tuck + rotation on occipital float- 10x   [place] 2 x  10   Rowing YTB 2 x 10   scapular depression YTB 2 x 10   [place] pec stretch 2 x 30 sec  Sitting: Upper trapezius stretch 2 x 30 sec        Home Exercises Provided and Patient Education Provided     Education provided:   - continue with HEP  - no need to perform Cornelius Doroff if dizziness does not return    Written Home Exercises Provided: Patient instructed to cont prior HEP.  Exercises were reviewed and Rogelio was able to demonstrate them prior to the end of the session.  Rogelio demonstrated good  understanding of the education provided.     See EMR under Patient Instructions for exercises provided 12/29/2021.    Assessment   Rogelio tolerated PT session well. Pt reports that she has not experienced dizziness since last PT session. Adryan Marie performed x 1 day. Pt reports headache frequency has decreased but she had at least 2 headaches since last session. Improved balance noted during tandem stance with eyes closed and head turns. She also demonstrated improved ability to perform tandem gait without staggering or loss of balance. Cervical and periscapular strengthening progressed with appropriate performance noted. Pt can benefit from continued skilled PT to return to prior level of function and increase self management of symptoms.       Rogelio Is progressing well towards her goals.   Pt prognosis is Good.     Pt will continue to benefit from skilled outpatient physical therapy to address the deficits listed in the problem list box on initial evaluation, provide pt/family education and to maximize pt's level of independence in the home and community environment.     Pt's spiritual, cultural and educational needs considered and pt agreeable to plan of care and goals.     Anticipated barriers to physical therapy: history of prior vertigo    Goals:   formal status as of 1/19/22  Short Term Goals: 5 weeks   1. Pt will present with a (-) Eduardo Hallpike/ roll test to demonstrate a resolution of BPPV. Met 1/19/22  2. Pt  will report decreased impact of dizziness on daily life to moderate by scoring 45/100 on DHI. Met 1/19/22  3. Assess FGA and set goals as needed. Met 1/4/22- 25/30  4. Pt will report decreased maximal cervical tightness to 5/10 to demonstrate improved muscle integrity for decreasing headache occurrence. ongoing  5. Pt will demonstrate ability to maintain a chin tuck for 20 sec to demonstrate improved endurance of postural muscles for decreased headaches and cervical tightness.  ongoing  6. Pt will demonstrate normal Visual Dynamic Acuity by performing with <4 line difference for improved tolerance to positional changes. Met 1/19/22     Long Term Goals: 10 weeks   1. Pt will perform HEP at least 3x/week for balance and cervical impairments to improve self management of sx. ongoing  2. Pt will report decreased impact of dizziness on daily life to minimal by scoring 20/100 on DHI. Met 1/19/22  3. Pt will deny significant dizziness associated with positional changes x 2 weeks to demonstrate improved ability to complete normal household tasks. ongoing  4. Pt will demonstrate improved cervical rotation to >65 deg to improve safety with driving. ongoing  5. Pt will report decreased headache occurrence to <25% of the time to demonstrate improved tolerance to daily mobility/ tasks. Met 1/19/22- 0%  6. Pt will perform romberg on foam with EC x 30 sec to demonstrate improvement of vestibular function for improved balance in dim environments. Met 1/19/22  7. New 1/19/22: Pt will demonstrate improved vestibular function to improve functional gait in the community and in varying environments by scoring 28/30 on FGA.     Plan   Outpatient Physical Therapy 2 times weekly to include the following interventions: Cervical/Lumbar Traction, Manual Therapy, Moist Heat/ Ice, Neuromuscular Re-ed, Patient Education, Therapeutic Activities, Therapeutic Exercise and canalith repositioning manuevers.      Address balance with horizontal head  turns and eyes closed. Tandem gait. Perform cervical strengthening and stretching.       Therese Padilla, PT, DPT, CBIS  Board-Certified Clinical Specialist in Neurologic Physical Therapy    1/27/2022

## 2022-01-31 NOTE — ASSESSMENT & PLAN NOTE
-Feedback session completed to discuss results and plan. Review Neuropsychology Consult dated for 1/4/2022 for complete details of feedback discussion, diagnosis, treatment plan.  -Additional concerns: None  -Follow-up: with psychiatry

## 2022-01-31 NOTE — PROGRESS NOTES
NEUROPSYCHOLOGY FEEDBACK (TELEHEALTH)    Referral Information  Name: Rogelio Davila  MRN: [id number]  : 1960  Age: 61 y.o.  Billing: Charges for Neuropsychology Feedback billed on 1/10/2022    Telemedicine:   The patient location is:  Home  The provider location is: [facility] [place]  The chief complaint leading to consultation/medical necessity is: Feedback session for results/treatment plan discussion  Visit type: Virtual visit with synchronous audio and video      Total time spent with patient: 40-min  Each patient to whom he or she provides medical services by telemedicine is:  (1) informed of the relationship between the physician and patient and the respective role of any other health care provider with respect to management of the patient; and (2) notified that he or she may decline to receive medical services by telemedicine and may withdraw from such care at any time.      Problem List Items Addressed This Visit        Psychiatric    Attention deficit hyperactivity disorder (ADHD), predominantly inattentive type - Primary    Current Assessment & Plan     -Feedback session completed to discuss results and plan. Review Neuropsychology Consult dated for 2022 for complete details of feedback discussion, diagnosis, treatment plan.  -Additional concerns: None  -Follow-up: with psychiatry               Adjustment disorder with mixed anxiety and depressed mood

## 2022-02-04 ENCOUNTER — CLINICAL SUPPORT (OUTPATIENT)
Dept: REHABILITATION | Facility: HOSPITAL | Age: 62
End: 2022-02-04
Attending: PSYCHIATRY & NEUROLOGY
Payer: COMMERCIAL

## 2022-02-04 DIAGNOSIS — R29.898 DECREASED ROM OF NECK: ICD-10-CM

## 2022-02-04 DIAGNOSIS — H81.12 BPPV (BENIGN PAROXYSMAL POSITIONAL VERTIGO), LEFT: ICD-10-CM

## 2022-02-04 DIAGNOSIS — G44.309 POST-TRAUMATIC HEADACHE, NOT INTRACTABLE, UNSPECIFIED CHRONICITY PATTERN: ICD-10-CM

## 2022-02-04 DIAGNOSIS — H81.92 VESTIBULOPATHY OF LEFT EAR: ICD-10-CM

## 2022-02-04 DIAGNOSIS — Z74.09 IMPAIRED FUNCTIONAL MOBILITY, BALANCE, AND ENDURANCE: ICD-10-CM

## 2022-02-04 PROCEDURE — 97110 THERAPEUTIC EXERCISES: CPT | Mod: PN | Performed by: PHYSICAL THERAPIST

## 2022-02-04 PROCEDURE — 97112 NEUROMUSCULAR REEDUCATION: CPT | Mod: PN | Performed by: PHYSICAL THERAPIST

## 2022-02-04 NOTE — PROGRESS NOTES
See POC, pt d/c from PT    Therese Padilla, PT, DPT, CBIS  Board-Certified Clinical Specialist in Neurologic Physical Therapy

## 2022-02-07 NOTE — PLAN OF CARE
Outpatient Therapy Discharge Summary     Name: Rogelio Davila  Clinic Number: [id number]    Therapy Diagnosis:   Encounter Diagnoses   Name Primary?    BPPV (benign paroxysmal positional vertigo), left     Impaired functional mobility, balance, and endurance     Post-traumatic headache, not intractable, unspecified chronicity pattern     Decreased ROM of neck     Vestibulopathy of left ear      Physician: Derik Tejada III, MD    Physician Orders: Derik Tejada III, MD     Physician Orders: PT Eval and Treat vestibular rehab, neck  Medical Diagnosis from Referral:   S13.4XXA (ICD-10-CM) - Whiplash, initial encounter   H81.92 (ICD-10-CM) - Vestibulopathy of left ear   F07.81 (ICD-10-CM) - Post-concussion syndrome      Evaluation Date: 12/23/2021  Authorization Period Expiration: 12/31/2022  Plan of Care Expiration: 2/18/2022  Visit # / Visits authorized: 8/ 20 (+ eval)     Time In: 0848  Time Out: 0930  Total Billable Time: 42 minutes    Precautions: Standard    Date of Last visit: 2/4/2022   Total Visits Received: 10    SUBJECTIVE      Pt reports: reports improvement in headaches, neck tightness and dizziness   Pt was compliant with home exercise program.     Pain: 0/10  Location: NA    CMS Impairment/Limitation/Restriction for FOTO Brain Injury Survey    Therapist reviewed FOTO scores for Rogelio Davila on 2/4/2022.   FOTO documents entered into [employer] - see Media section.    Limitation Score: 7%  Category: Mobility       OBJECTIVE     ROM:   CERVICAL SPINE AROM:    eval D/C   Flexion: (norm: 80-90 deg) 45 deg 50 deg   Extension: (norm: 70-80 deg) 40 deg 30 deg   Left Sidebend: (norm: 20-45 deg) 28 deg 44 deg   Right Sidebend: (norm: 20-45 deg) 33 deg 40 deg   Left Rotation: (norm: 70-90 deg) 56 deg 51 deg   Right Rotation: (norm: 70-90 deg) 61 deg 60 deg     UPPER EXTREMITY--AROM/PROM  BUE WFLs           RANGE OF MOTION--LOWER EXTREMITIES  Grossly WFLs, no reports of impairment by pt     Strength: manual  "muscle test grades below   Upper Extremity Strength  NT    Lower Extremity Strength  Grossly WFLs, no reports of impairment by pt     Mejia Sensory Testing:  (P= Pass, F= Fail; note any sway; hold each position for 30")  Condition 1: (firm surface/feet together/eyes open) P, min-no sway  Condition 2: (firm surface/feet together/eyes closed) P min swayCondition 3: (firm surface/feet in tandem/eyes open) P (L front), mod sway  Condition 4: (firm surface/feet in tandem/eyes closed) P, min sway  Condition 5: (soft surface/feet together/eyes open) P, min sway  Condition 6: (soft surface/feet together/eyes closed) F (3 sec), fell R sway  Condition 7: (Fakuda step test), measure distance varied from center starting position NT due to poor balance with EC    Functional Gait Assessment:   1. Gait on level surface =  2, 5.9 sec   (3) Normal: less than 5.5 sec, no A.D., no imbalance, normal gait pattern, deviates< 6in   (2) Mild impairment: 7-5.6 sec, uses A.D., mild gait deviations, or deviates 6-10 in   (1) Moderate impairment: > 7 sec, slow speed, imbalance, deviates 10-15 in.   (0) Severe impairment: needs assist, deviates >15 in, reach/touch wall  2. Change in Gait Speed = 3   (3) Normal: smooth change w/o loss of balance or gait deviation, deviates < 6 in, significant difference between speeds   (2) Mild impairment: changes speed, but demonstrates mild gait deviations, deviates 6-10 in, OR no deviations but unable to significantly speed, OR uses A.D.   (1) Moderate impairment: minor changes to speed, OR changes speed w/ significant deviations, deviates 10-15 in, OR  Changes speed , but loses balance & recovers   (0) Severe impairment: cannot change speed, deviates >15 in, or loses balance & needs assist  3. Gait with horizontal head turns  = 3   (3) Normal: no change in gait, deviates <6 in   (2) Mild impairment: slight change in speed, deviates 6-10 in, OR uses A.D.   (1) Moderate impairment: moderate change in speed, " deviates 10-15 in   (0) Severe impairment: severe disruption of gait, deviates >15in  4. Gait with vertical head turns = 3   (3) Normal: no change in gait, deviates <6 in   (2) Mild impairment: slight change in speed, deviates 6-10 in OR uses A.D.   (1) Moderate impairment: moderate change in speed, deviates 10-15 in   (0) Severe impairment: severe disruption of gait, deviates >15 in  5. Gait with pivot turns = 3   (3) Normal: performs safely in 3 sec, no LOB   (2) Mild impairment: performs in >3 sec & no LOB, OR turns safely & requires several steps to regain LOB   (1) Moderate impairment: turns slow, OR requires several small steps for balance following turn & stop   (0) Severe impairment: cannot turn safely, needs assist  6. Step over obstacle = 3   (3) Normal: steps over 2 stacked boxes w/o change in speed or LOB   (2) Mild impairment: able to step over 1 box w/o change in speed or LOB   (1) Moderate impairment: steps over 1 box but must slow down, may require VC   (0) Severe impairment: cannot perform w/o assist  7. Gait with Narrow COMPA = 3   (3) Normal: 10 steps no staggering   (2) Mild impairment: 7-9 steps   (1) Moderate impairment: 4-7 steps   (0) Severe impairment: < 4 steps or cannot perform w/o assist  8. Gait with eyes closed = 3   (3) Normal: < 7 sec, no A.D., no LOB, normal gait pattern, deviates <6 in   (2) Mild impairment: 7.1-9 sec, mild gait deviations, deviates 6-10 in   (1) Moderate impairment: > 9 sec, abnormal pattern, LOB, deviates 10-15 in   (0) Severe impairment: cannot perform w/o assist, LOB, deviates >15in  9. Ambulating Backwards = 3   (3) Normal: no A.D., no LOB, normal gait pattern, deviates <6in   (2) Mild impairment: uses A.D., slower speed, mild gait deviations, deviates 6-10 in   (1) Moderate impairment: slow speed, abnormal gait pattern, LOB, deviates 10-15 in   (0) Severe impairment: severe gait deviations or LOB, deviates >15in  10. Steps = 3   (3) Normal: alternating feet, no  rail   (2) Mild Impairment: alternating feet, uses rail   (1) Moderate impairment: step-to, uses rail   (0) Severe impairment: cannot perform safely  Score 29/30     Score:   <22/30 fall risk   <20/30 fall risk in older adults   <18/30 fall risk in Parkinsons     Gait Assessment:   - AD used: none  - Assistance: I  - Distance: community  - Curb: I  - Ramp:  I     D/C   Timed Up and Go NT   Self Selected Walking Speed 1.02 m/sec (6m/5.9s)   Fast Walking Speed NT     Functional Mobility (Bed mobility, transfers)  Bed mobility: I  Supine to sit: I  Sit to supine: I  Rolling: I  Transfers to bed: I  Transfers to toilet: I  Sit to stand:  I  Stand pivot:  I  Car transfers: I  Wheelchair mobility: NA  Floor transfers: NT    Rogelio received therapeutic exercises to develop strength, flexibility, posture and core stabilization for 25 minutes including:    UBE L4 3 min    Standing: wall slides with RTB 2 x 10   Serratus stars RTB x10   Chin tuck + rotation on occipital float- 10x   Edie 2 x 10   Rowing RTB 2 x 10   scapular depression RTB 2 x 10   Corner pec stretch 2 x 30 sec    Rogelio participated in neuromuscular re-education activities to improve: Balance and vestibular response for 17 minutes. The following activities were included:  YNES  FGA  // bar:    Tandem stance on foam 2 x 30 sec= min sway       Written Home Exercises Provided: Patient instructed to cont prior HEP.- upgraded to RTB for continued periscapular strengthening  Exercises were reviewed and Rogelio was able to demonstrate them prior to the end of the session.  Rogelio demonstrated good  understanding of the education provided.     See EMR under Patient Instructions for exercises provided prior visit.      Assessment    61 year old female with diagnosis of whiplash, vestibulopathy of L ear, and post concussive syndrome referred to [facility] received skilled outpatient PT 12/23/2021 to 2/4/2022. Pt did well with PT meeting all short term  goals and 6/7 long term goal set. Pt initially experienced significant dizziness. She currently denies any dizziness and is able to carry out her normal daily mobility without difficulty. Pt also reports a resolution in headaches and no longer experiences significant cervical tightness. A significant improvement in vestibular function is noted with pt performing well in all conditions of YNES. Pt had noted improvement in cervical side bending demonstrating improvement in muscle integrity. Pt also demonstrates improved ability to perform a chin tuck for improved postural stability. Motor control for cervical rotation is still impaired but per pt report WFLs. passive range of motion is WFLs. Pt was instructed to continue with HEP to improve control for cervical rotation and periscapular strengthening to eliminate cervical tightness. Pt was upgraded to a higher resistance band for strengthening and tolerated well during today's session. Pt no longer requires skilled PT services and should be able to improve cervical rotation with continued participation in HEP.    Goals:   Short Term Goals:    1. Pt will present with a (-) [place] Hall[place]/ roll test to demonstrate a resolution of BPPV. Met   2. Pt will report decreased impact of dizziness on daily life to moderate by scoring 45/100 on DHI. Met   3. Assess FGA and set goals as needed. Met   4. Pt will report decreased maximal cervical tightness to 5/10 to demonstrate improved muscle integrity for decreasing headache occurrence. met  5. Pt will demonstrate ability to maintain a chin tuck for 20 sec to demonstrate improved endurance of postural muscles for decreased headaches and cervical tightness.  met  6. Pt will demonstrate normal Visual Dynamic Acuity by performing with <4 line difference for improved tolerance to positional changes. Met      Long Term Goals:   1. Pt will perform HEP at least 3x/week for balance and cervical impairments to improve self management of sx.  met  2. Pt will report decreased impact of dizziness on daily life to minimal by scoring 20/100 on DHI. Met   3. Pt will deny significant dizziness associated with positional changes x 2 weeks to demonstrate improved ability to complete normal household tasks. met  4. Pt will demonstrate improved cervical rotation to >65 deg to improve safety with driving. Not met  5. Pt will report decreased headache occurrence to <25% of the time to demonstrate improved tolerance to daily mobility/ tasks. Met 1/19/22- 0%  6. Pt will perform romberg on foam with EC x 30 sec to demonstrate improvement of vestibular function for improved balance in dim environments. Met   7. New 1/19/22: Pt will demonstrate improved vestibular function to improve functional gait in the community and in varying environments by scoring 28/30 on FGA. met    Discharge reason: Patient has reached the maximum rehab potential for the present time    Plan   This patient is discharged from Physical Therapy       Therese Padilla, PT, DPT, CBIS  Board-Certified Clinical Specialist in Neurologic Physical Therapy    2/4/2022

## 2022-02-21 ENCOUNTER — PATIENT MESSAGE (OUTPATIENT)
Dept: OBSTETRICS AND GYNECOLOGY | Facility: CLINIC | Age: 62
End: 2022-02-21
Payer: COMMERCIAL

## 2022-02-21 DIAGNOSIS — N95.1 MENOPAUSAL SYNDROME: ICD-10-CM

## 2022-02-22 RX ORDER — ESTRADIOL 0.05 MG/D
1 FILM, EXTENDED RELEASE TRANSDERMAL
Qty: 8 PATCH | Refills: 1 | Status: SHIPPED | OUTPATIENT
Start: 2022-02-24 | End: 2022-03-30 | Stop reason: SDUPTHER

## 2022-03-15 ENCOUNTER — OFFICE VISIT (OUTPATIENT)
Dept: NEUROLOGY | Facility: CLINIC | Age: 62
End: 2022-03-15
Payer: COMMERCIAL

## 2022-03-15 VITALS
HEART RATE: 78 BPM | BODY MASS INDEX: 35.46 KG/M2 | HEIGHT: 62 IN | WEIGHT: 192.69 LBS | DIASTOLIC BLOOD PRESSURE: 95 MMHG | SYSTOLIC BLOOD PRESSURE: 144 MMHG

## 2022-03-15 DIAGNOSIS — F07.81 POST-CONCUSSION SYNDROME: ICD-10-CM

## 2022-03-15 DIAGNOSIS — S06.0X1D CONCUSSION WITH LOSS OF CONSCIOUSNESS OF 30 MINUTES OR LESS, SUBSEQUENT ENCOUNTER: Primary | ICD-10-CM

## 2022-03-15 PROBLEM — H81.92 VESTIBULOPATHY OF LEFT EAR: Status: RESOLVED | Noted: 2021-12-24 | Resolved: 2022-03-15

## 2022-03-15 PROBLEM — G44.309 POST-TRAUMATIC HEADACHE, NOT INTRACTABLE: Status: RESOLVED | Noted: 2021-12-24 | Resolved: 2022-03-15

## 2022-03-15 PROCEDURE — 3080F PR MOST RECENT DIASTOLIC BLOOD PRESSURE >= 90 MM HG: ICD-10-PCS | Mod: CPTII,S$GLB,, | Performed by: PSYCHIATRY & NEUROLOGY

## 2022-03-15 PROCEDURE — 3077F SYST BP >= 140 MM HG: CPT | Mod: CPTII,S$GLB,, | Performed by: PSYCHIATRY & NEUROLOGY

## 2022-03-15 PROCEDURE — 1159F MED LIST DOCD IN RCRD: CPT | Mod: CPTII,S$GLB,, | Performed by: PSYCHIATRY & NEUROLOGY

## 2022-03-15 PROCEDURE — 99214 PR OFFICE/OUTPT VISIT, EST, LEVL IV, 30-39 MIN: ICD-10-PCS | Mod: S$GLB,,, | Performed by: PSYCHIATRY & NEUROLOGY

## 2022-03-15 PROCEDURE — 99999 PR PBB SHADOW E&M-EST. PATIENT-LVL III: ICD-10-PCS | Mod: PBBFAC,,, | Performed by: PSYCHIATRY & NEUROLOGY

## 2022-03-15 PROCEDURE — 3080F DIAST BP >= 90 MM HG: CPT | Mod: CPTII,S$GLB,, | Performed by: PSYCHIATRY & NEUROLOGY

## 2022-03-15 PROCEDURE — 3008F PR BODY MASS INDEX (BMI) DOCUMENTED: ICD-10-PCS | Mod: CPTII,S$GLB,, | Performed by: PSYCHIATRY & NEUROLOGY

## 2022-03-15 PROCEDURE — 99214 OFFICE O/P EST MOD 30 MIN: CPT | Mod: S$GLB,,, | Performed by: PSYCHIATRY & NEUROLOGY

## 2022-03-15 PROCEDURE — 1160F PR REVIEW ALL MEDS BY PRESCRIBER/CLIN PHARMACIST DOCUMENTED: ICD-10-PCS | Mod: CPTII,S$GLB,, | Performed by: PSYCHIATRY & NEUROLOGY

## 2022-03-15 PROCEDURE — 3008F BODY MASS INDEX DOCD: CPT | Mod: CPTII,S$GLB,, | Performed by: PSYCHIATRY & NEUROLOGY

## 2022-03-15 PROCEDURE — 1159F PR MEDICATION LIST DOCUMENTED IN MEDICAL RECORD: ICD-10-PCS | Mod: CPTII,S$GLB,, | Performed by: PSYCHIATRY & NEUROLOGY

## 2022-03-15 PROCEDURE — 3077F PR MOST RECENT SYSTOLIC BLOOD PRESSURE >= 140 MM HG: ICD-10-PCS | Mod: CPTII,S$GLB,, | Performed by: PSYCHIATRY & NEUROLOGY

## 2022-03-15 PROCEDURE — 1160F RVW MEDS BY RX/DR IN RCRD: CPT | Mod: CPTII,S$GLB,, | Performed by: PSYCHIATRY & NEUROLOGY

## 2022-03-15 PROCEDURE — 99999 PR PBB SHADOW E&M-EST. PATIENT-LVL III: CPT | Mod: PBBFAC,,, | Performed by: PSYCHIATRY & NEUROLOGY

## 2022-03-15 NOTE — PROGRESS NOTES
Subjective:       Patient ID: Rogelio Davila is a 61 y.o. female.    Reason for Consult: Follow-up      Interval History:  Rogelio Davila is here for follow up. Their condition has improved significantly.  The patient has been discharged from vestibular rehab.  We have discussed that the patient now notes had to do her own Cawthorne exercises.  She notes that she has had her formal neuropsychological testing and good feedback from the neuropsychologist.  We have reviewed that neuropsychological testing that states that she has no permanent deficit from her concussion and that she does have a high level of intelligence but does have some attentional issues related to her chronic ADHD.  Patient feels back to her normal self from prior to her injury.    Objective:     Vitals:    03/15/22 1012   BP: (!) 144/95   Pulse: 78     Patient is awake alert oriented to person place and time.  Moves all 4 extremities against gravity.  Gait and station within normal limits.  Cranial nerves 2-12 were without focal deficits.  Focused examination was undertaken today. Most of the visit time was spent giving guidance, counseling and discussing treatment options.    Results for orders placed or performed during the hospital encounter of 12/30/21   MRI Brain Without Contrast    Narrative    EXAMINATION:  MRI BRAIN WITHOUT CONTRAST    CLINICAL HISTORY:  Dizziness, persistent/recurrent, cardiac or vascular cause suspected; Repeated falls    TECHNIQUE:  Multiplanar multisequence MR imaging of the brain was performed without contrast.    COMPARISON:  None.    FINDINGS:  Additional comment: Examination is mildly motion compromised    Parenchyma: There is no restricted diffusion to suggest acute or subacute ischemic infarct.Generalized pattern of age-related parenchymal volume loss is noted.  Scattered areas of nonspecific frontoparietal predominant T2/FLAIR hyperintense signal are nonspecific, but may relate to sequela of chronic small  vessel ischemic disease.    Additional comments: There is no midline shift, abnormal extra-axial fluid collection, or acute intracranial hemorrhage. The basal cisterns are patent.    Ventricles: Normal.    Flow voids: The normal major intracranial arterial flow voids are visualized.    Sinuses and mastoid air cells: Essentially clear    Orbits: Normal    Midline structures: The pituitary and craniocervical junction are normal.    Marrow: Normal        Impression    1. No acute intracranial findings.  No definite etiology of the patient's symptoms identified.  2. Nonspecific areas of frontoparietal white matter signal abnormality could relate to sequelae of chronic small vessel ischemic disease.      Electronically signed by: Pancho Rosales  Date:    12/30/2021  Time:    08:35       Assessment/Plan:     Problem List Items Addressed This Visit    None     Visit Diagnoses     Concussion with loss of consciousness of 30 minutes or less, subsequent encounter    -  Primary    Post-concussion syndrome            61-year-old female presents for evaluation and follow-up of concussion sustained in August of 2021. At this time the patient feels back to normal.  I have discussed with the patient that if she starts to have her BPPV type symptoms again that she should start doing her Cawthorne exercises.  She notes that she is not suffering from headaches anymore and she feels cognitively at baseline.  She can continue to take her ADHD medication and see me back on an as-needed basis, as we have discussed that her concussion has now resolved.    The patient verbalizes understanding and agreement with the treatment plan. I have discussed risks, benefits and alternatives to the treatment plan. Questions were sought and answered to her stated verbal satisfaction.        Bobo Tejada MD    This note is dictated on M*Modal Fluency Direct word recognition program. There are word recognition mistakes that are occasionally missed on  review.

## 2022-03-30 ENCOUNTER — TELEPHONE (OUTPATIENT)
Dept: OBSTETRICS AND GYNECOLOGY | Facility: CLINIC | Age: 62
End: 2022-03-30
Payer: COMMERCIAL

## 2022-03-30 ENCOUNTER — OFFICE VISIT (OUTPATIENT)
Dept: OBSTETRICS AND GYNECOLOGY | Facility: CLINIC | Age: 62
End: 2022-03-30
Payer: COMMERCIAL

## 2022-03-30 VITALS
DIASTOLIC BLOOD PRESSURE: 92 MMHG | SYSTOLIC BLOOD PRESSURE: 140 MMHG | WEIGHT: 193.56 LBS | HEIGHT: 62 IN | BODY MASS INDEX: 35.62 KG/M2

## 2022-03-30 DIAGNOSIS — N95.1 MENOPAUSAL SYNDROME: ICD-10-CM

## 2022-03-30 DIAGNOSIS — N39.41 URGE INCONTINENCE: ICD-10-CM

## 2022-03-30 DIAGNOSIS — Z78.0 MENOPAUSE: ICD-10-CM

## 2022-03-30 DIAGNOSIS — Z01.419 ENCOUNTER FOR GYNECOLOGICAL EXAMINATION WITHOUT ABNORMAL FINDING: Primary | ICD-10-CM

## 2022-03-30 DIAGNOSIS — Z12.31 SCREENING MAMMOGRAM, ENCOUNTER FOR: ICD-10-CM

## 2022-03-30 LAB
AMORPH CRY UR QL COMP ASSIST: ABNORMAL
BILIRUB UR QL STRIP: NEGATIVE
CLARITY UR REFRACT.AUTO: ABNORMAL
COLOR UR AUTO: YELLOW
GLUCOSE UR QL STRIP: NEGATIVE
HGB UR QL STRIP: NEGATIVE
KETONES UR QL STRIP: NEGATIVE
LEUKOCYTE ESTERASE UR QL STRIP: NEGATIVE
MICROSCOPIC COMMENT: ABNORMAL
NITRITE UR QL STRIP: NEGATIVE
PH UR STRIP: 7 [PH] (ref 5–8)
PROT UR QL STRIP: NEGATIVE
SP GR UR STRIP: 1.01 (ref 1–1.03)
URN SPEC COLLECT METH UR: ABNORMAL

## 2022-03-30 PROCEDURE — 99396 PR PREVENTIVE VISIT,EST,40-64: ICD-10-PCS | Mod: S$GLB,,, | Performed by: OBSTETRICS & GYNECOLOGY

## 2022-03-30 PROCEDURE — 3077F SYST BP >= 140 MM HG: CPT | Mod: CPTII,S$GLB,, | Performed by: OBSTETRICS & GYNECOLOGY

## 2022-03-30 PROCEDURE — 3077F PR MOST RECENT SYSTOLIC BLOOD PRESSURE >= 140 MM HG: ICD-10-PCS | Mod: CPTII,S$GLB,, | Performed by: OBSTETRICS & GYNECOLOGY

## 2022-03-30 PROCEDURE — 99396 PREV VISIT EST AGE 40-64: CPT | Mod: S$GLB,,, | Performed by: OBSTETRICS & GYNECOLOGY

## 2022-03-30 PROCEDURE — 87086 URINE CULTURE/COLONY COUNT: CPT | Performed by: OBSTETRICS & GYNECOLOGY

## 2022-03-30 PROCEDURE — 3080F PR MOST RECENT DIASTOLIC BLOOD PRESSURE >= 90 MM HG: ICD-10-PCS | Mod: CPTII,S$GLB,, | Performed by: OBSTETRICS & GYNECOLOGY

## 2022-03-30 PROCEDURE — 3008F PR BODY MASS INDEX (BMI) DOCUMENTED: ICD-10-PCS | Mod: CPTII,S$GLB,, | Performed by: OBSTETRICS & GYNECOLOGY

## 2022-03-30 PROCEDURE — 3008F BODY MASS INDEX DOCD: CPT | Mod: CPTII,S$GLB,, | Performed by: OBSTETRICS & GYNECOLOGY

## 2022-03-30 PROCEDURE — 99999 PR PBB SHADOW E&M-EST. PATIENT-LVL III: ICD-10-PCS | Mod: PBBFAC,,, | Performed by: OBSTETRICS & GYNECOLOGY

## 2022-03-30 PROCEDURE — 3080F DIAST BP >= 90 MM HG: CPT | Mod: CPTII,S$GLB,, | Performed by: OBSTETRICS & GYNECOLOGY

## 2022-03-30 PROCEDURE — 99999 PR PBB SHADOW E&M-EST. PATIENT-LVL III: CPT | Mod: PBBFAC,,, | Performed by: OBSTETRICS & GYNECOLOGY

## 2022-03-30 PROCEDURE — 81001 URINALYSIS AUTO W/SCOPE: CPT | Performed by: OBSTETRICS & GYNECOLOGY

## 2022-03-30 RX ORDER — ESTRADIOL 0.05 MG/D
1 FILM, EXTENDED RELEASE TRANSDERMAL
Qty: 24 PATCH | Refills: 3 | Status: SHIPPED | OUTPATIENT
Start: 2022-03-31 | End: 2022-12-12 | Stop reason: SDUPTHER

## 2022-03-30 RX ORDER — CETIRIZINE HYDROCHLORIDE 10 MG/1
10 TABLET ORAL
COMMUNITY

## 2022-03-30 RX ORDER — UBIDECARENONE 30 MG
100 CAPSULE ORAL
COMMUNITY

## 2022-03-30 RX ORDER — BIOTIN 1 MG
1000 TABLET ORAL
COMMUNITY

## 2022-03-30 RX ORDER — MV/FA/DHA/EPA/FISH OIL/SAW/GNK 400MCG-200
1 COMBINATION PACKAGE (EA) ORAL
COMMUNITY

## 2022-03-30 RX ORDER — ESCITALOPRAM OXALATE 10 MG/1
10 TABLET ORAL DAILY
COMMUNITY
Start: 2022-03-22

## 2022-03-30 RX ORDER — PROGESTERONE 200 MG/1
CAPSULE ORAL
Qty: 90 CAPSULE | Refills: 3 | Status: SHIPPED | OUTPATIENT
Start: 2022-03-30 | End: 2022-05-23

## 2022-03-30 NOTE — TELEPHONE ENCOUNTER
Called patient and offered her to come in earlier due to the weather. Patient states she will come in at 1:00 pm on today.

## 2022-03-30 NOTE — PROGRESS NOTES
Subjective:       Patient ID: Rogelio Davila is a 61 y.o. female.    Chief Complaint:  Gynecologic Exam      History of Present Illness  HPI    Rogelio Davila is a 61 y.o. female  here for her annual GYN exam. She is doing well on her HRT and wants to stay on it, but has started to have urge incontinence during the past year.    She is menopausal since age 51.  denies break through bleeding.   denies vaginal itching or irritation.  Denies vaginal discharge.  She is not sexually active.    History of abnormal pap: No  Last Pap: approximate date Dec 2020 and was normal  Last MMG: normal-2021-routine follow-up in 12 months  Last Colonoscopy:  colonoscopy several years ago with abnormalities.  denies domestic violence. She does feel safe at home.     Past Medical History:   Diagnosis Date    Abnormal Pap smear of cervix     Hyperlipidemia     Hypertension      Past Surgical History:   Procedure Laterality Date     SECTION      x2    COLPOSCOPY       Social History     Socioeconomic History    Marital status:    Tobacco Use    Smoking status: Former Smoker     Quit date: 2013     Years since quittin.7    Smokeless tobacco: Former User   Substance and Sexual Activity    Alcohol use: Yes     Comment: occasionally    Drug use: No    Sexual activity: Not Currently     Comment:    Other Topics Concern    Are you pregnant or think you may be? No     Family History   Problem Relation Age of Onset    Breast cancer Paternal Aunt 58    Cancer Maternal Grandfather         lung    Diabetes Maternal Grandfather     Stroke Father 62    Hypertension Father     Heart attacks under age 50 Father         smoker, drinker    Hypertension Mother     Melanoma Neg Hx     Ovarian cancer Neg Hx     Colon cancer Neg Hx      OB History        2    Para   2    Term   2            AB        Living   2       SAB        IAB        Ectopic        Multiple        Live  "Births   2                 BP (!) 140/92   Ht 5' 2" (1.575 m)   Wt 87.8 kg (193 lb 9 oz)   LMP 2010   BMI 35.40 kg/m²         GYN & OB History  Patient's last menstrual period was 2010.   Date of Last Pap: 2021    OB History    Para Term  AB Living   2 2 2     2   SAB IAB Ectopic Multiple Live Births           2      # Outcome Date GA Lbr Andreas/2nd Weight Sex Delivery Anes PTL Lv   2 Term      CS-Unspec  N LIDA   1 Term      CS-Unspec  N LIDA       Review of Systems  Review of Systems   Constitutional: Negative for activity change, appetite change, fatigue and unexpected weight change.   HENT: Negative.    Eyes: Negative for visual disturbance.   Respiratory: Negative for shortness of breath and wheezing.    Cardiovascular: Negative for chest pain, palpitations and leg swelling.   Gastrointestinal: Negative for abdominal pain, bloating and blood in stool.   Endocrine: Negative for diabetes, hair loss and hot flashes.   Genitourinary: Positive for bladder incontinence and urgency. Negative for decreased libido, dyspareunia, hot flashes and vaginal dryness.   Musculoskeletal: Negative for back pain and joint swelling.   Integumentary:  Negative for acne, hair changes and nipple discharge.   Neurological: Negative for headaches.   Hematological: Does not bruise/bleed easily.   Psychiatric/Behavioral: Negative for depression and sleep disturbance. The patient is not nervous/anxious.    Breast: Negative for mastodynia and nipple discharge          Objective:      Physical Exam:   Constitutional: She is oriented to person, place, and time. She appears well-developed and well-nourished.    HENT:   Head: Normocephalic and atraumatic.    Eyes: Pupils are equal, round, and reactive to light. EOM are normal.     Cardiovascular: Normal rate and regular rhythm.     Pulmonary/Chest: Effort normal and breath sounds normal.   BREASTS:  no mass, no tenderness, no deformity and no retraction. Right " breast exhibits no inverted nipple, no mass, no nipple discharge, no skin change, no tenderness, no bleeding and no swelling. Left breast exhibits no inverted nipple, no mass, no nipple discharge, no skin change, no tenderness, no bleeding and no swelling. Breasts are symmetrical.              Abdominal: Soft. Bowel sounds are normal.     Genitourinary:    Pelvic exam was performed with patient supine.      Genitourinary Comments: PELVIC: Normal external genitalia without lesions.  Normal hair distribution.  Adequate perineal body, normal urethral meatus.  Vagina  Dry and poorly rugated, atrophic, without lesions or discharge.  Cervix pink, without lesions, discharge or tenderness.  No significant cystocele or rectocele.  Bimanual exam shows uterus to be normal size, regular, mobile and nontender.  Adnexa without masses or tenderness.    RECTAL:Deferred               Musculoskeletal: Normal range of motion and moves all extremeties.       Neurological: She is alert and oriented to person, place, and time.    Skin: Skin is warm and dry.    Psychiatric: She has a normal mood and affect.              Assessment:        1. Encounter for gynecological examination without abnormal finding    2. Menopausal syndrome    3. Menopause    4. Screening mammogram, encounter for    5. Urge incontinence               Plan:           1. Encounter for gynecological examination without abnormal finding  COUNSELING:  The patient was counseled today on regular weight bearing exercise. Patient was counseled today on the new ACS guidelines for cervical cytology screening as well as the current recommendations for breast cancer screening. Counseling session lasted approximately 10 minutes, and all her questions were answered. She was advised to see her primary care physician for all other health maintenance.   FOLLOW-UP with me for next routine visit.         2. Menopausal syndrome      - estradiol 0.05 mg/24 hr td ptsw (VIVELLE-DOT) 0.05  mg/24 hr; Place 1 patch onto the skin twice a week.  Dispense: 24 patch; Refill: 3    3. Menopause      - progesterone (PROMETRIUM) 200 MG capsule; TAKE 1 CAPSULE(200 MG) BY MOUTH EVERY NIGHT  Dispense: 90 capsule; Refill: 3    4. Screening mammogram, encounter for      - Mammo Digital Screening Bilat w/ Nacho; Future    5. Urge incontinence      - Urine culture  - Urinalysis       Follow up in about 1 year (around 3/30/2023).

## 2022-03-31 LAB — BACTERIA UR CULT: NO GROWTH

## 2022-05-08 ENCOUNTER — OFFICE VISIT (OUTPATIENT)
Dept: URGENT CARE | Facility: CLINIC | Age: 62
End: 2022-05-08
Payer: COMMERCIAL

## 2022-05-08 VITALS
HEIGHT: 62 IN | HEART RATE: 78 BPM | BODY MASS INDEX: 35.62 KG/M2 | TEMPERATURE: 99 F | DIASTOLIC BLOOD PRESSURE: 79 MMHG | OXYGEN SATURATION: 97 % | RESPIRATION RATE: 16 BRPM | SYSTOLIC BLOOD PRESSURE: 116 MMHG | WEIGHT: 193.56 LBS

## 2022-05-08 DIAGNOSIS — S51.812A LACERATION OF LEFT FOREARM, INITIAL ENCOUNTER: Primary | ICD-10-CM

## 2022-05-08 PROCEDURE — 73090 XR FOREARM LEFT: ICD-10-PCS | Mod: LT,S$GLB,, | Performed by: RADIOLOGY

## 2022-05-08 PROCEDURE — 3008F PR BODY MASS INDEX (BMI) DOCUMENTED: ICD-10-PCS | Mod: CPTII,S$GLB,,

## 2022-05-08 PROCEDURE — 1160F PR REVIEW ALL MEDS BY PRESCRIBER/CLIN PHARMACIST DOCUMENTED: ICD-10-PCS | Mod: CPTII,S$GLB,,

## 2022-05-08 PROCEDURE — 3074F SYST BP LT 130 MM HG: CPT | Mod: CPTII,S$GLB,,

## 2022-05-08 PROCEDURE — 1160F RVW MEDS BY RX/DR IN RCRD: CPT | Mod: CPTII,S$GLB,,

## 2022-05-08 PROCEDURE — 3074F PR MOST RECENT SYSTOLIC BLOOD PRESSURE < 130 MM HG: ICD-10-PCS | Mod: CPTII,S$GLB,,

## 2022-05-08 PROCEDURE — 1159F MED LIST DOCD IN RCRD: CPT | Mod: CPTII,S$GLB,,

## 2022-05-08 PROCEDURE — 3078F PR MOST RECENT DIASTOLIC BLOOD PRESSURE < 80 MM HG: ICD-10-PCS | Mod: CPTII,S$GLB,,

## 2022-05-08 PROCEDURE — 3008F BODY MASS INDEX DOCD: CPT | Mod: CPTII,S$GLB,,

## 2022-05-08 PROCEDURE — 99213 OFFICE O/P EST LOW 20 MIN: CPT | Mod: 25,S$GLB,,

## 2022-05-08 PROCEDURE — 12001 LACERATION REPAIR: ICD-10-PCS | Mod: S$GLB,,,

## 2022-05-08 PROCEDURE — 12001 RPR S/N/AX/GEN/TRNK 2.5CM/<: CPT | Mod: S$GLB,,,

## 2022-05-08 PROCEDURE — 73090 X-RAY EXAM OF FOREARM: CPT | Mod: LT,S$GLB,, | Performed by: RADIOLOGY

## 2022-05-08 PROCEDURE — 3078F DIAST BP <80 MM HG: CPT | Mod: CPTII,S$GLB,,

## 2022-05-08 PROCEDURE — 1159F PR MEDICATION LIST DOCUMENTED IN MEDICAL RECORD: ICD-10-PCS | Mod: CPTII,S$GLB,,

## 2022-05-08 PROCEDURE — 99213 PR OFFICE/OUTPT VISIT, EST, LEVL III, 20-29 MIN: ICD-10-PCS | Mod: 25,S$GLB,,

## 2022-05-08 RX ORDER — MUPIROCIN 20 MG/G
OINTMENT TOPICAL 3 TIMES DAILY
Qty: 22 G | Refills: 0 | Status: SHIPPED | OUTPATIENT
Start: 2022-05-08 | End: 2023-06-05

## 2022-05-08 NOTE — PROCEDURES
Laceration Repair    Date/Time: 2022 11:15 AM  Performed by: Mayank Seth PA-C  Authorized by: Mayank Seth PA-C   Consent Done: Yes  Consent: Verbal consent obtained.  Risks and benefits: risks, benefits and alternatives were discussed  Consent given by: patient  Patient understanding: patient states understanding of the procedure being performed  Patient identity confirmed:  and name  Body area: upper extremity  Location details: left lower arm  Laceration length: 2 cm  Foreign bodies: no foreign bodies  Tendon involvement: none  Nerve involvement: none  Vascular damage: no  Anesthesia: local infiltration    Anesthesia:  Local Anesthetic: lidocaine 1% without epinephrine  Anesthetic total: 1 mL    Patient sedated: no  Preparation: Patient was prepped and draped in the usual sterile fashion.  Irrigation solution: tap water  Irrigation method: tap  Amount of cleaning: standard  Debridement: none  Degree of undermining: none  Skin closure: 5-0 nylon  Number of sutures: 2  Technique: simple  Approximation: close  Approximation difficulty: simple  Dressing: 4x4 sterile gauze  Patient tolerance: Patient tolerated the procedure well with no immediate complications

## 2022-05-08 NOTE — PROGRESS NOTES
"Subjective:       Patient ID: Rogelio Davila is a 61 y.o. female.    Vitals:  height is 5' 2" (1.575 m) and weight is 87.8 kg (193 lb 9 oz). Her temperature is 98.6 °F (37 °C). Her blood pressure is 116/79 and her pulse is 78. Her respiration is 16 and oxygen saturation is 97%.     Chief Complaint: Laceration    Patient is a 61-year-old female who presents with a laceration to her left forearm that occurred 1 hour PTA.  Patient states that she was cutting a cold stick of butter and a class measuring cup when it shattered.  She is unsure if she got cut from a knife or from the broken pieces of glass.  Denies any pain, surrounding erythema or swelling, numbness, tingling.  Last tetanus 2016.     Laceration   The incident occurred less than 1 hour ago. The laceration is located on the left arm. The laceration is 2 cm in size. The laceration mechanism was a broken glass and dirty knife. The pain is at a severity of 0/10. The patient is experiencing no pain. Possible foreign bodies include glass.       Constitution: Negative for chills and fever.   Skin: Positive for laceration. Negative for color change.       Objective:      Physical Exam   Constitutional: She is oriented to person, place, and time.  Non-toxic appearance. She does not appear ill. No distress.   Abdominal: Normal appearance.   Musculoskeletal: Normal range of motion.         General: No tenderness. Normal range of motion.   Neurological: no focal deficit. She is alert and oriented to person, place, and time.   Skin: Skin is warm and dry. Capillary refill takes less than 2 seconds.         Comments: 2 cm superficial laceration to left forearm.  No active bleeding.  No significant surrounding erythema or swelling.  No foreign bodies palpated.       XR FOREARM LEFT    Result Date: 5/8/2022  EXAMINATION: XR FOREARM LEFT CLINICAL HISTORY: Laceration from broken glass.;Laceration without foreign body of left forearm, initial encounter TECHNIQUE: AP and " lateral views of the left forearm were performed. COMPARISON: None available. FINDINGS: No acute fracture, dislocation, or osseous destruction.  Remote appearing posttraumatic change at the radial head on frontal view.  No radiopaque foreign body.     As above. Electronically signed by: Yoni Leone Date:    05/08/2022 Time:    11:44        Assessment:       1. Laceration of left forearm, initial encounter          Plan:         Laceration of left forearm, initial encounter  -     XR FOREARM LEFT; Future; Expected date: 05/08/2022  -     mupirocin (BACTROBAN) 2 % ointment; Apply topically 3 (three) times daily.  Dispense: 22 g; Refill: 0  -     Laceration Repair         Patient is 61-year-old female presents with a laceration to left forearm that occurred about 1 hour PTA.  Unsure if she cut herself with a knife or with broken glass.  She concerned that there may be glass pieces in the laceration.  Vital signs stable.  On exam, there is a 2 cm laceration to her left forearm.  No signs of infection.  No foreign bodies palpated.  Obtain x-ray to rule out foreign bodies.  No foreign bodies noted.  Laceration repair was performed.  Two sutures placed.  Discussed general wound care with patient.  Patient to return in 7-10 days for wound check and suture removal.  Instructed patient to monitor for signs of infection.  Patient verbalizes understanding and agrees with plan.        Patient Instructions   Laceration Home Care Instructions    Keep your dressing on for 24 hours. Do not wet laceration for 12 hours.  After 24 hours remove the dressing and gently clean wound with soap and water at least twice daily unless more frequently soiled, then clean more frequently.    May apply topical antibiotic (avoid neosporin) to wound to promote healing.   Clean old antibiotic ointment away before new application.    DO NOT REMOVE SUTURES YOURSELF.    PLEASE RETURN HERE OR ANOTHER [facility] IN 7-10 DAYS FOR  SUTURE OR STAPLE REMOVAL  Signs of infection include:  Increase in redness, increase in pain, purulent (pus) drainage. Contact clinic if infection concerns arise.   Do not apply a great deal of tension or stress to the suture line.  Keep covered when you are out and about, if the dressing becomes wet, change it immediately. Keep open to air when at home.

## 2022-05-08 NOTE — PATIENT INSTRUCTIONS
Laceration Home Care Instructions    Keep your dressing on for 24 hours. Do not wet laceration for 12 hours.  After 24 hours remove the dressing and gently clean wound with soap and water at least twice daily unless more frequently soiled, then clean more frequently.    May apply topical antibiotic (avoid neosporin) to wound to promote healing.   Clean old antibiotic ointment away before new application.    DO NOT REMOVE SUTURES YOURSELF.    PLEASE RETURN HERE OR ANOTHER [facility] IN 7-10 DAYS FOR SUTURE OR STAPLE REMOVAL  Signs of infection include:  Increase in redness, increase in pain, purulent (pus) drainage. Contact clinic if infection concerns arise.   Do not apply a great deal of tension or stress to the suture line.  Keep covered when you are out and about, if the dressing becomes wet, change it immediately. Keep open to air when at home.

## 2022-05-20 ENCOUNTER — OFFICE VISIT (OUTPATIENT)
Dept: URGENT CARE | Facility: CLINIC | Age: 62
End: 2022-05-20
Payer: COMMERCIAL

## 2022-05-20 VITALS
RESPIRATION RATE: 18 BRPM | TEMPERATURE: 97 F | HEIGHT: 62 IN | OXYGEN SATURATION: 96 % | BODY MASS INDEX: 35.51 KG/M2 | DIASTOLIC BLOOD PRESSURE: 82 MMHG | SYSTOLIC BLOOD PRESSURE: 166 MMHG | WEIGHT: 193 LBS | HEART RATE: 75 BPM

## 2022-05-20 DIAGNOSIS — Z48.02 VISIT FOR SUTURE REMOVAL: Primary | ICD-10-CM

## 2022-05-20 PROCEDURE — 3077F PR MOST RECENT SYSTOLIC BLOOD PRESSURE >= 140 MM HG: ICD-10-PCS | Mod: CPTII,S$GLB,,

## 2022-05-20 PROCEDURE — 3008F PR BODY MASS INDEX (BMI) DOCUMENTED: ICD-10-PCS | Mod: CPTII,S$GLB,,

## 2022-05-20 PROCEDURE — 1159F PR MEDICATION LIST DOCUMENTED IN MEDICAL RECORD: ICD-10-PCS | Mod: CPTII,S$GLB,,

## 2022-05-20 PROCEDURE — 3079F PR MOST RECENT DIASTOLIC BLOOD PRESSURE 80-89 MM HG: ICD-10-PCS | Mod: CPTII,S$GLB,,

## 2022-05-20 PROCEDURE — 99024 POSTOP FOLLOW-UP VISIT: CPT | Mod: S$GLB,,,

## 2022-05-20 PROCEDURE — 99024 SUTURE REMOVAL: ICD-10-PCS | Mod: S$GLB,,,

## 2022-05-20 PROCEDURE — 3079F DIAST BP 80-89 MM HG: CPT | Mod: CPTII,S$GLB,,

## 2022-05-20 PROCEDURE — 3008F BODY MASS INDEX DOCD: CPT | Mod: CPTII,S$GLB,,

## 2022-05-20 PROCEDURE — 1160F PR REVIEW ALL MEDS BY PRESCRIBER/CLIN PHARMACIST DOCUMENTED: ICD-10-PCS | Mod: CPTII,S$GLB,,

## 2022-05-20 PROCEDURE — 1160F RVW MEDS BY RX/DR IN RCRD: CPT | Mod: CPTII,S$GLB,,

## 2022-05-20 PROCEDURE — 99499 NO LOS: ICD-10-PCS | Mod: S$GLB,,,

## 2022-05-20 PROCEDURE — 3077F SYST BP >= 140 MM HG: CPT | Mod: CPTII,S$GLB,,

## 2022-05-20 PROCEDURE — 1159F MED LIST DOCD IN RCRD: CPT | Mod: CPTII,S$GLB,,

## 2022-05-20 PROCEDURE — 99499 UNLISTED E&M SERVICE: CPT | Mod: S$GLB,,,

## 2022-05-20 NOTE — PATIENT INSTRUCTIONS
- Follow up with your PCP or specialty clinic as directed in the next 1-2 weeks if not improved or as needed.  You can call [phone] to schedule an appointment with the appropriate provider.    - Go to the ER or seek medical attention immediately if you develop new or worsening symptoms.    - You must understand that you have received an Urgent Care treatment only and that you may be released before all of your medical problems are known or treated.   - You, the patient, will arrange for follow up care as instructed.   - If your condition worsens or fails to improve we recommend that you receive another evaluation at the ER immediately or contact your PCP to discuss your concerns or return here.

## 2022-05-20 NOTE — PROCEDURES
Suture Removal    Date/Time: 5/20/2022 6:15 PM  Location procedure was performed[place] [facility]  Performed by: Mayank Seth PA-C  Authorized by: Mayank Seth PA-C   Assisting provider: Mayank Seth PA-C  Pre-operative diagnosis: Laceration  Post-operative diagnosis: Laceration  Body area: upper extremity  Location details: left lower arm  Description of findings: Well-healing laceration without any signs of infection.   Wound Appearance: clean, well healed, normal color, nontender and no drainage  Sutures Removed: 2  Facility: sutures placed in this facility  Technical procedures used: n/a  Significant surgical tasks conducted by the assistant(s): n/a  Complications: No  Estimated blood loss (mL): 0  Specimens: No  Implants: No  Patient tolerance: Patient tolerated the procedure well with no immediate complications

## 2022-05-20 NOTE — PROGRESS NOTES
"Subjective:       Patient ID: Rogelio Davila is a 61 y.o. female.    Vitals:  height is 5' 2" (1.575 m) and weight is 87.5 kg (193 lb). Her temperature is 97.3 °F (36.3 °C). Her blood pressure is 166/82 (abnormal) and her pulse is 75. Her respiration is 18 and oxygen saturation is 96%.     Chief Complaint: Suture / Staple Removal    Patient is a 61-year-old female who is here for suture removal.  Sutures were placed 12 days ago.  Patient states that she has basal was not able to come within 7-10 days. Denies any pain, swelling, redness, purulent discharge.    Suture / Staple Removal  The sutures were placed 11 to 14 days ago. She tried antibiotic ointment use since the wound repair. The treatment provided no relief. There has been no drainage from the wound. There is no redness present. There is no swelling present. There is no pain present.       Skin: Positive for laceration. Negative for color change and erythema.       Objective:      Physical Exam   Constitutional: She is oriented to person, place, and time. She appears well-developed.   HENT:   Head: Normocephalic and atraumatic. Head is without abrasion, without contusion and without laceration.   Ears:   Right Ear: External ear normal.   Left Ear: External ear normal.   Nose: Nose normal.   Mouth/Throat: Oropharynx is clear and moist and mucous membranes are normal.   Eyes: Conjunctivae, EOM and lids are normal. Pupils are equal, round, and reactive to light.   Neck: Trachea normal and phonation normal. Neck supple.   Cardiovascular: Normal rate, regular rhythm and normal heart sounds.   Pulmonary/Chest: Effort normal and breath sounds normal. No stridor. No respiratory distress.   Musculoskeletal: Normal range of motion.         General: Normal range of motion.   Neurological: She is alert and oriented to person, place, and time.   Skin: Skin is warm, dry, intact and no rash. Capillary refill takes less than 2 seconds. No abrasion, No burn, No bruising, " No erythema and No ecchymosis         Comments: Well-healing laceration to left forearm.  Two sutures in place.  No erythema, swelling, purulent discharge.   Psychiatric: Her speech is normal and behavior is normal. Judgment and thought content normal.   Nursing note and vitals reviewed.        Assessment:       1. Visit for suture removal          Plan:         Visit for suture removal  -     Suture Removal                 Patient Instructions   - Follow up with your PCP or specialty clinic as directed in the next 1-2 weeks if not improved or as needed.  You can call [phone] to schedule an appointment with the appropriate provider.    - Go to the ER or seek medical attention immediately if you develop new or worsening symptoms.    - You must understand that you have received an Urgent Care treatment only and that you may be released before all of your medical problems are known or treated.   - You, the patient, will arrange for follow up care as instructed.   - If your condition worsens or fails to improve we recommend that you receive another evaluation at the ER immediately or contact your PCP to discuss your concerns or return here.

## 2022-11-05 NOTE — TELEPHONE ENCOUNTER
Called patient in reference to her mammogram and scheduling a abnormal mammogram follow up. No answer. Left the patient a message with my direct phone number.     [facility]  130/01  HOSPITAL MEDICINE PROGRESS NOTE   Patient: Ariel Camargo  Today's Date: 11/5/2022    YOB: 1957  Admission Date: 10/31/2022    MRN: [id number]  Inpatient LOS: 4    Attending: Sony Barahona MD  Hospital Day: Hospital Day: 6    Subjective   HISTORY AND SUBJECTIVE COMPLAINTS     Chief Complaint:   Abdomen pain, nausea/vomiting    Interval History / Subjective:   Patient seen and examined at the bedside today.  No new concerns/complaints. No acute overnight events reported.  Asleep but easily arousable.  Was in a lot of pain this morning.  Did not want me to palpate his abdomen.  Abdominal incisions sites covered with clean dressings.  Encouraged to use incentive spirometer.  Mouth was really dry.  Lungs are clear to auscultation.  NG tube in place.  Nilesh Drain in place with a small amount of serosanguineous fluid.   Lovenox restarted by surgery.  Will keep an eye on the NG tube output for GI bleed.  Denies any Fever, Chest pain, Shortness of breath, Cough, Nausea, Vomiting and  symptoms.       Hospital Course:  Ariel Camargo is a 65 year old male with history of rectal cancer status post neoadjuvant radiation and radiation sensitizing chemotherapy, abdominal perineal proctectomy with end colostomy, and adjuvant chemotherapy in 2015/2016, right inguinal hernia repair x2, diabetes mellitus type 2, hypertension, DVT on chronic anticoagulation with Xarelto, anxiety/depression who presented on 10/31/2022 with complaints of Abdominal Pain, Nausea, and Vomiting    History of recurrent small bowel obstructions requiring multiple inpatient admissions.  These have been managed conservatively with NG tube decompression.  He saw Dr. Barahona, general surgery as an outpatient.  They had planned robotic adhesiolysis with possible conversion to open and possible small-bowel obstruction this month.    CT abdomen pelvis performed consistent with small-bowel obstruction transition  point in the deep pelvis.  General surgery consulted and NG tube placed to low intermittent suction.  Coffee-ground output noted in NG-tube and Gastroenterology consulted for endoscopy, found large esophageal ulcer. Biopsy pathology pending. PPI drip x 72 hours. PICC placed for TPN.   Operative exploration scheduled for Friday at 7:30 a.m..    ROS:  Pertinent systems negative except as above.    Objective   PHYSICAL EXAMINATION     Vital 24 Hour Range Most Recent Value   Temperature Temp  Min: 98.4 °F (36.9 °C)  Max: 100 °F (37.8 °C) 98.4 °F (36.9 °C)   Pulse Pulse  Min: 101  Max: 133 (!) 101   Respiratory Resp  Min: 18  Max: 32 20   Blood Pressure BP  Min: 136/84  Max: 175/84 (!) 146/84   Pulse Oximetry SpO2  Min: 91 %  Max: 95 % 92 %   Arterial BP No data recorded     O2 O2 Flow Rate (L/min)  Av L/min  Min: 2 L/min   Min taken time: 22  Max: 2 L/min   Max taken time: 22 0430       Recorded Intake and Output:    Intake/Output Summary (Last 24 hours) at 2022 1511  Last data filed at 2022 1438  Gross per 24 hour   Intake 5407.31 ml   Output 1875 ml   Net 3532.31 ml      Recorded Last Stool Occurrence:       Vital Most Recent Value First Value   Weight 110.9 kg (244 lb 7.8 oz) Weight: 104.3 kg (230 lb)   Height 6' 0.99\" (185.4 cm) Height: 6' 1\" (185.4 cm)   BMI 32.26 N/A     General: Looks chronically ill no apparent distress  CV: tachycardic  Resp: clear to auscultation bilaterally  Abd: soft, nontender and nondistended. NG to low LIS.   Ext: no clubbing, cyanosis, or ischemia, edema absent  and radial pulses equal bilaterally and dorsalis pedis pulses equal bilaterally   Skin: no rashes, lesions, or ulcers noted  Neuro: normal sensation  and no focal deficits noted  Psych: oriented to time, place and person    TEST RESULTS     Labs: The Laboratory values listed below have been reviewed and pertinent findings discussed in the Assessment and Plan.    Laboratory values:     Recent Labs    Lab 11/05/22  0545 11/04/22  1731 11/04/22  0504 11/03/22  2246 11/03/22  0451   SODIUM 135  --  132*  --  134*   POTASSIUM 4.7 5.0 3.7   < > 3.2*   CHLORIDE 103  --  94*  --  94*   CO2 23  --  29  --  34*   CALCIUM 7.5*  --  8.5  --  8.3*   GLUCOSE 272*  --  152*  --  168*   BUN 17  --  16  --  16   CREATININE 0.53*  --  0.57*  --  0.61*   MG 1.9 1.1* 1.3*  --  1.5*    < > = values in this interval not displayed.        Recent Labs   Lab 11/05/22  0545 11/04/22  0504 11/03/22  0451   ALBUMIN 2.0* 2.7* 2.7*   PHOS 2.6 4.4 4.4   AST 15 19 21   GPT 15 25 31   BILIRUBIN 0.2 0.2 0.2     Recent Labs   Lab 10/31/22  1707   PCT 0.19*     Recent Labs   Lab 11/04/22  1741 11/05/22  0038 11/05/22  0551 11/05/22  1309   GLUCOSE BEDSIDE 249* 320* 281* 246*       Lab Results   Component Value Date    VB12 258 06/05/2018    TSH 1.146 05/17/2016    HGBA1C 5.6 03/22/2022        Lab Results   Component Value Date    CHOLESTEROL 130 07/19/2021    HDL 27 (L) 07/19/2021    CALCLDL 41 07/19/2021        Lab Results   Component Value Date    USPG <1.005 (L) 10/31/2022    UPROT 30  (A) 10/31/2022    UWBC Negative 10/31/2022    URBC Negative 10/31/2022    UNITR Negative 10/31/2022    UPH 5.5 10/31/2022    UBACTRA None Seen 10/31/2022       Recent Labs   Lab 10/31/22  1707   PT 10.3   INR 0.9       Radiology: Imaging studies have been reviewed and pertinent findings discussed in the Assessment and Plan.  No results found for any visits on 10/31/22 (from the past 48 hour(s)).  ANCILLARY ORDERS     Diet:  Npo Diet With Exceptions; Medications  Telemetry: On  Consults:    IP CONSULT TO GENERAL SURGERY  IP CONSULT TO GI  IP CONSULT TO NUTRITIONAL SERVICES - PN  PHARMACY TO DOSE PARENTERAL NUTRITION  INPATIENT DIETARY CONSULT TO PHARMACY FOR PARENTERAL NUTRITION ORDERING  INPATIENT DIETARY CONSULT TO PHARMACY FOR PARENTERAL NUTRITION ORDERING  IP CONSULT TO SOCIAL WORK  Therapy Orders:   PT and OT Orders Placed this Encounter   Procedures   •  Occupational Therapy   • Occupational Therapy   • Physical Therapy   • Physical Therapy     ADVANCED DIRECTIVES     Code Status: Full Resuscitation           ASSESSMENT AND PLAN     Recurrent small-bowel obstruction  Admitted most recently 9/24-9/28, 8/14-8/16, 8/1-8/4 for SBO, management with NG placement and decompression.  Was scheduled for surgery 11/4 with Dr. Barahona.  CT findings consistent with small-bowel obstruction with transition point in the deep pelvis.  -NG placement to low intermittent suction, bowel rest, NPO  -IV fluids, electrolyte replacement  -PICC, TPN  -General surgery following   s/p laparotomy entero-enteric bypass from distal jejunum to terminal ileum, placement of claribel drain left in right paracolic gutt    Coffee-ground output from nasogastric tube, upper GI bleed Large esophageal ulcer present at the GE junction  Multiple linear fibrin based ulcerations in the cardia  Ostomy output is not dark.  Had nausea and vomiting prior to his admission.  No NSAID use or history of GI bleeding.  -trend hemoglobin and hematocrit, has been stable  -gastroenterology consultation s/p endoscopy 11/1-- biopsy performed  -hold Xarelto and Lovenox initially held.  Restarted by surgery on 11/04/2022.  -pantoprazole IV b.i.d.    History of rectal cancer status post neoadjuvant radiation and radiation sensitizing chemotherapy, abdominal perineal proctectomy with end colostomy, and adjuvant chemotherapy in 2015/2016  Follows with Dr. Jimenez.  No evidence of disease recurrence.    History of DVT, chronic anticoagulation with Xarelto  Dr. Jimenez's note this past April states he has been on Xarelto for 6 months and was previously told he could stop after 6 months?  Limited thrombophilia evaluation negative for hereditary or acquired causes of clotting.  No abnormal findings.    Diabetes mellitus type 2  Patient is on metformin and glimepiride at home.  Also takes Lantus 36 units at bedtime.  He also takes Humalog 8 units  at breakfast and 10 units at supper. A1c 5.6 in March 2022.  -hold metformin and glimepiride for now  -TPN  -currently on Lantus 10 units nightly and lispro every 6 hours correction scale    Hyponatremia, hypomagnesemia, hypokalemia  -TPN initiation, IV fluids per general surgery (LR at 100 mL/hr)  -PICC placed 11/1  -replete per magnesium, potassium supplement protocol    Sinus tachycardia, frequent ectopy (improved)  Initially felt to be atrial fibrillation however Cardiology felt this was sinus with frequent PACs with re-read of EKG.  No history of atrial fibrillation. Trop neg x3.  -IV fluids and electrolyte replacement has improved HRs  -continue to monitor on telemetry    Hyperlipidemia-PTA: Lipitor. Hold for now  Hypertension-PTA: lisinopril, triamterene/hydrochlorothiazide. Hold for now. BP stable off these medications.  Tremors-continue primidone, propanolol, neurontin  COPD, former tobacco use-no evidence of acute exacerbation.  Continue fluticasone/salmeterol  Depression/anxiety-continue Effexor    Smoking status: former smoker    Nutrition status: moderate protein calorie malnutrition  Body mass index is 32.26 kg/m². - Obese (BMI 30-39 without a comorbid condition or 30-34 with a comorbid condition)  DVT Prophylaxis: SCDs         DISCHARGE PLANNING     The patient's treatment plans were discussed with patient.    Discharge Planning       Barriers to discharge: Patient is not medically ready and needs to remain in the hospital today due to SBO, bowel surgery 11/4  Anticipated discharge destination: Home  Expected Discharge Date: PB Chauhan MD  Hospitalist  11/5/2022  3:11 PM

## 2022-11-07 ENCOUNTER — PATIENT MESSAGE (OUTPATIENT)
Dept: OBSTETRICS AND GYNECOLOGY | Facility: CLINIC | Age: 62
End: 2022-11-07
Payer: COMMERCIAL

## 2022-11-29 ENCOUNTER — HOSPITAL ENCOUNTER (OUTPATIENT)
Dept: RADIOLOGY | Facility: HOSPITAL | Age: 62
Discharge: HOME | End: 2022-11-29
Attending: OBSTETRICS & GYNECOLOGY
Payer: COMMERCIAL

## 2022-11-29 DIAGNOSIS — Z12.31 SCREENING MAMMOGRAM, ENCOUNTER FOR: ICD-10-CM

## 2022-11-29 PROCEDURE — 77067 SCR MAMMO BI INCL CAD: CPT | Mod: 26,,, | Performed by: RADIOLOGY

## 2022-11-29 PROCEDURE — 77063 BREAST TOMOSYNTHESIS BI: CPT | Mod: 26,,, | Performed by: RADIOLOGY

## 2022-11-29 PROCEDURE — 77067 SCR MAMMO BI INCL CAD: CPT | Mod: TC,PO

## 2022-11-29 PROCEDURE — 77063 MAMMO DIGITAL SCREENING BILAT WITH TOMO: ICD-10-PCS | Mod: 26,,, | Performed by: RADIOLOGY

## 2022-11-29 PROCEDURE — 77067 MAMMO DIGITAL SCREENING BILAT WITH TOMO: ICD-10-PCS | Mod: 26,,, | Performed by: RADIOLOGY

## 2022-12-12 ENCOUNTER — PATIENT MESSAGE (OUTPATIENT)
Dept: OBSTETRICS AND GYNECOLOGY | Facility: CLINIC | Age: 62
End: 2022-12-12
Payer: COMMERCIAL

## 2022-12-12 DIAGNOSIS — N95.1 MENOPAUSAL SYNDROME: ICD-10-CM

## 2022-12-13 RX ORDER — ESTRADIOL 0.05 MG/D
1 FILM, EXTENDED RELEASE TRANSDERMAL
Qty: 24 PATCH | Refills: 0 | Status: SHIPPED | OUTPATIENT
Start: 2022-12-15 | End: 2023-03-07

## 2023-06-05 ENCOUNTER — TELEPHONE (OUTPATIENT)
Dept: OBSTETRICS AND GYNECOLOGY | Facility: CLINIC | Age: 63
End: 2023-06-05
Payer: COMMERCIAL

## 2023-06-05 ENCOUNTER — OFFICE VISIT (OUTPATIENT)
Dept: OBSTETRICS AND GYNECOLOGY | Facility: CLINIC | Age: 63
End: 2023-06-05
Payer: COMMERCIAL

## 2023-06-05 VITALS
BODY MASS INDEX: 37.04 KG/M2 | HEIGHT: 62 IN | DIASTOLIC BLOOD PRESSURE: 76 MMHG | WEIGHT: 201.25 LBS | SYSTOLIC BLOOD PRESSURE: 151 MMHG

## 2023-06-05 DIAGNOSIS — N95.1 MENOPAUSAL SYNDROME: ICD-10-CM

## 2023-06-05 DIAGNOSIS — Z01.419 ENCOUNTER FOR GYNECOLOGICAL EXAMINATION WITHOUT ABNORMAL FINDING: Primary | ICD-10-CM

## 2023-06-05 DIAGNOSIS — N95.2 POSTMENOPAUSAL ATROPHIC VAGINITIS: ICD-10-CM

## 2023-06-05 DIAGNOSIS — Z78.0 MENOPAUSE: ICD-10-CM

## 2023-06-05 DIAGNOSIS — Z12.4 ENCOUNTER FOR PAPANICOLAOU SMEAR FOR CERVICAL CANCER SCREENING: ICD-10-CM

## 2023-06-05 DIAGNOSIS — N92.6 MENSTRUAL PERIOD LATE: Primary | ICD-10-CM

## 2023-06-05 PROCEDURE — 87624 HPV HI-RISK TYP POOLED RSLT: CPT | Performed by: OBSTETRICS & GYNECOLOGY

## 2023-06-05 PROCEDURE — 99999 PR PBB SHADOW E&M-EST. PATIENT-LVL III: ICD-10-PCS | Mod: PBBFAC,,, | Performed by: OBSTETRICS & GYNECOLOGY

## 2023-06-05 PROCEDURE — 88175 CYTOPATH C/V AUTO FLUID REDO: CPT | Performed by: OBSTETRICS & GYNECOLOGY

## 2023-06-05 PROCEDURE — 1159F PR MEDICATION LIST DOCUMENTED IN MEDICAL RECORD: ICD-10-PCS | Mod: CPTII,S$GLB,, | Performed by: OBSTETRICS & GYNECOLOGY

## 2023-06-05 PROCEDURE — 3078F PR MOST RECENT DIASTOLIC BLOOD PRESSURE < 80 MM HG: ICD-10-PCS | Mod: CPTII,S$GLB,, | Performed by: OBSTETRICS & GYNECOLOGY

## 2023-06-05 PROCEDURE — 1160F RVW MEDS BY RX/DR IN RCRD: CPT | Mod: CPTII,S$GLB,, | Performed by: OBSTETRICS & GYNECOLOGY

## 2023-06-05 PROCEDURE — 3077F SYST BP >= 140 MM HG: CPT | Mod: CPTII,S$GLB,, | Performed by: OBSTETRICS & GYNECOLOGY

## 2023-06-05 PROCEDURE — 3077F PR MOST RECENT SYSTOLIC BLOOD PRESSURE >= 140 MM HG: ICD-10-PCS | Mod: CPTII,S$GLB,, | Performed by: OBSTETRICS & GYNECOLOGY

## 2023-06-05 PROCEDURE — 99999 PR PBB SHADOW E&M-EST. PATIENT-LVL III: CPT | Mod: PBBFAC,,, | Performed by: OBSTETRICS & GYNECOLOGY

## 2023-06-05 PROCEDURE — 1159F MED LIST DOCD IN RCRD: CPT | Mod: CPTII,S$GLB,, | Performed by: OBSTETRICS & GYNECOLOGY

## 2023-06-05 PROCEDURE — 1160F PR REVIEW ALL MEDS BY PRESCRIBER/CLIN PHARMACIST DOCUMENTED: ICD-10-PCS | Mod: CPTII,S$GLB,, | Performed by: OBSTETRICS & GYNECOLOGY

## 2023-06-05 PROCEDURE — 99396 PREV VISIT EST AGE 40-64: CPT | Mod: S$GLB,,, | Performed by: OBSTETRICS & GYNECOLOGY

## 2023-06-05 PROCEDURE — 3008F PR BODY MASS INDEX (BMI) DOCUMENTED: ICD-10-PCS | Mod: CPTII,S$GLB,, | Performed by: OBSTETRICS & GYNECOLOGY

## 2023-06-05 PROCEDURE — 3078F DIAST BP <80 MM HG: CPT | Mod: CPTII,S$GLB,, | Performed by: OBSTETRICS & GYNECOLOGY

## 2023-06-05 PROCEDURE — 3008F BODY MASS INDEX DOCD: CPT | Mod: CPTII,S$GLB,, | Performed by: OBSTETRICS & GYNECOLOGY

## 2023-06-05 PROCEDURE — 99396 PR PREVENTIVE VISIT,EST,40-64: ICD-10-PCS | Mod: S$GLB,,, | Performed by: OBSTETRICS & GYNECOLOGY

## 2023-06-05 RX ORDER — PROGESTERONE 200 MG/1
200 CAPSULE ORAL NIGHTLY
Qty: 90 CAPSULE | Refills: 3 | Status: SHIPPED | OUTPATIENT
Start: 2023-06-05

## 2023-06-05 RX ORDER — ESTRADIOL 10 UG/1
10 INSERT VAGINAL DAILY
Qty: 18 EACH | Refills: 0 | Status: SHIPPED | OUTPATIENT
Start: 2023-06-05 | End: 2023-06-05 | Stop reason: RX

## 2023-06-05 RX ORDER — ESTRADIOL 10 UG/1
10 INSERT VAGINAL DAILY
Qty: 18 EACH | Refills: 0 | Status: SHIPPED | OUTPATIENT
Start: 2023-06-05

## 2023-06-05 RX ORDER — MELOXICAM 15 MG/1
15 TABLET ORAL DAILY PRN
COMMUNITY
Start: 2023-03-17

## 2023-06-05 RX ORDER — ESTRADIOL 0.05 MG/D
1 FILM, EXTENDED RELEASE TRANSDERMAL
Qty: 24 PATCH | Refills: 3 | Status: SHIPPED | OUTPATIENT
Start: 2023-06-05

## 2023-06-05 RX ORDER — ESTRADIOL 10 UG/1
10 INSERT VAGINAL
Qty: 8 EACH | Refills: 11 | Status: SHIPPED | OUTPATIENT
Start: 2023-06-05

## 2023-06-05 RX ORDER — MEDICAL SUPPLY, MISCELLANEOUS
MISCELLANEOUS MISCELLANEOUS
COMMUNITY
Start: 2023-02-10

## 2023-06-05 RX ORDER — METOPROLOL TARTRATE 25 MG/1
1 TABLET, FILM COATED ORAL 2 TIMES DAILY
COMMUNITY
Start: 2023-05-05

## 2023-06-05 NOTE — PROGRESS NOTES
Subjective:       Patient ID: oRgelio Davila is a 62 y.o. female.    Chief Complaint:  Annual Exam and Gynecologic Exam      History of Present Illness  Gynecologic Exam  Associated symptoms include frequency and urgency. Pertinent negatives include no abdominal pain, back pain or headaches.     Rogelio Davila is a 62 y.o. female  here for her annual GYN exam.    She is menopausal since age 49, and is on HRT, feels well and wants to continue it.  No longer has vasomotor symptoms. She does report vaginal dryness.  denies break through bleeding.   denies vaginal itching or irritation.  Denies vaginal discharge.  She is not sexually active.    History of abnormal pap: Yes - years ago   Last Pap: approximate date 2020 and was normal(and negative for HR HPV)  Last MMG: normal-2022: BI-RADS Category 1: Negative-routine follow-up in 12 months  Last Colonoscopy:  colonoscopy 5 years ago without abnormalities.  Denies domestic violence.     Past Medical History:   Diagnosis Date    Abnormal Pap smear of cervix     Hyperlipidemia     Hypertension      Past Surgical History:   Procedure Laterality Date     SECTION      x2    COLPOSCOPY       Social History     Socioeconomic History    Marital status:    Tobacco Use    Smoking status: Former     Types: Cigarettes     Quit date: 2013     Years since quittin.9    Smokeless tobacco: Former   Substance and Sexual Activity    Alcohol use: Yes     Comment: occasionally    Drug use: No    Sexual activity: Not Currently     Comment:    Other Topics Concern    Are you pregnant or think you may be? No     Family History   Problem Relation Age of Onset    Breast cancer Paternal Aunt 58    Cancer Maternal Grandfather         lung    Diabetes Maternal Grandfather     Stroke Father 62    Hypertension Father     Heart attacks under age 50 Father         smoker, drinker    Hypertension Mother     Melanoma Neg Hx     Ovarian cancer Neg Hx   "   Colon cancer Neg Hx      OB History          2    Para   2    Term   2            AB        Living   2         SAB        IAB        Ectopic        Multiple        Live Births   2                 BP (!) 151/76   Ht 5' 2" (1.575 m)   Wt 91.3 kg (201 lb 4.5 oz) Comment: 64 kg  LMP 2010   BMI 36.81 kg/m²         GYN & OB History  Patient's last menstrual period was 2010.   Date of Last Pap: 2021    OB History    Para Term  AB Living   2 2 2     2   SAB IAB Ectopic Multiple Live Births           2      # Outcome Date GA Lbr Andreas/2nd Weight Sex Delivery Anes PTL Lv   2 Term      CS-Unspec  N LIDA   1 Term      CS-Unspec  N LIDA       Review of Systems  Review of Systems   Constitutional:  Negative for activity change, appetite change, fatigue and unexpected weight change.   HENT: Negative.     Eyes:  Negative for visual disturbance.   Respiratory:  Negative for shortness of breath and wheezing.    Cardiovascular:  Negative for chest pain, palpitations and leg swelling.   Gastrointestinal:  Negative for abdominal pain, bloating and blood in stool.   Endocrine: Positive for hair loss. Negative for diabetes.   Genitourinary:  Positive for frequency, urgency and vaginal dryness. Negative for hot flashes.   Musculoskeletal:  Negative for back pain and joint swelling.   Integumentary:  Positive for hair changes. Negative for acne and nipple discharge.   Neurological:  Negative for headaches.   Hematological:  Does not bruise/bleed easily.   Psychiatric/Behavioral:  Negative for depression and sleep disturbance. The patient is not nervous/anxious.    Breast: Negative for mastodynia and nipple discharge        Objective:      Physical Exam:   Constitutional: She is oriented to person, place, and time. She appears well-developed and well-nourished.    HENT:   Head: Normocephalic and atraumatic.    Eyes: Pupils are equal, round, and reactive to light. EOM are normal.   "   Cardiovascular:  Normal rate and regular rhythm.             Pulmonary/Chest: Effort normal and breath sounds normal.   BREASTS:  no mass, no tenderness, no deformity and no retraction. Right breast exhibits no inverted nipple, no mass, no nipple discharge, no skin change, no tenderness, no bleeding and no swelling. Left breast exhibits no inverted nipple, no mass, no nipple discharge, no skin change, no tenderness, no bleeding and no swelling. Breasts are symmetrical.              Abdominal: Soft. Bowel sounds are normal.     Genitourinary:    Pelvic exam was performed with patient supine.      Genitourinary Comments: PELVIC: Normal external genitalia without lesions.  Normal hair distribution.  Adequate perineal body, normal urethral meatus.  Vagina  Dry and poorly rugated, atrophic, without lesions or discharge.  Cervix pink, without lesions, discharge or tenderness.  No significant cystocele or rectocele.  Bimanual exam shows uterus to be normal size, regular, mobile and nontender.  Adnexa without masses or tenderness.    RECTAL:Deferred                 Musculoskeletal: Normal range of motion and moves all extremeties.       Neurological: She is alert and oriented to person, place, and time.    Skin: Skin is warm and dry.    Psychiatric: She has a normal mood and affect.            Assessment:        1. Encounter for gynecological examination without abnormal finding    2. Encounter for Papanicolaou smear for cervical cancer screening    3. Menopausal syndrome    4. Menopause    5. Postmenopausal atrophic vaginitis                Plan:        Problem List Items Addressed This Visit    None  Visit Diagnoses       Encounter for gynecological examination without abnormal finding    -  Primary    Encounter for Papanicolaou smear for cervical cancer screening        Relevant Orders    Liquid-Based Pap Smear, Screening    HPV High Risk Genotypes, PCR    Menopausal syndrome        Relevant Medications    estradiol  0.05 mg/24 hr td ptsw (LATISHA) 0.05 mg/24 hr    Menopause        Relevant Medications    progesterone (PROMETRIUM) 200 MG capsule    Postmenopausal atrophic vaginitis        Relevant Medications    estradioL (IMVEXXY MAINTENANCE PACK) 10 mcg Inst    estradioL (IMVEXXY STARTER PACK) 10 mcg InPk             Follow up in about 1 year (around 6/5/2024).

## 2023-06-12 LAB
FINAL PATHOLOGIC DIAGNOSIS: NORMAL
Lab: NORMAL

## 2024-01-16 DIAGNOSIS — Z78.0 MENOPAUSE: ICD-10-CM

## 2024-01-16 RX ORDER — PROGESTERONE 100 MG/1
200 CAPSULE ORAL NIGHTLY
Qty: 180 CAPSULE | Refills: 1 | Status: SHIPPED | OUTPATIENT
Start: 2024-01-16

## 2024-02-09 ENCOUNTER — PATIENT MESSAGE (OUTPATIENT)
Dept: OBSTETRICS AND GYNECOLOGY | Facility: CLINIC | Age: 64
End: 2024-02-09
Payer: COMMERCIAL

## 2024-06-21 DIAGNOSIS — Z78.0 MENOPAUSE: ICD-10-CM

## 2024-06-21 RX ORDER — PROGESTERONE 200 MG/1
200 CAPSULE ORAL NIGHTLY
Qty: 90 CAPSULE | Refills: 0 | Status: SHIPPED | OUTPATIENT
Start: 2024-06-21

## 2024-06-21 NOTE — TELEPHONE ENCOUNTER
Refill Decision Note   Rogelio Davila  is requesting a refill authorization.  Brief Assessment and Rationale for Refill:  Approve     Medication Therapy Plan:         Comments:     Note composed:5:37 PM 06/21/2024

## 2024-07-17 DIAGNOSIS — N95.1 MENOPAUSAL SYNDROME: ICD-10-CM

## 2024-07-18 RX ORDER — ESTRADIOL 0.05 MG/D
FILM, EXTENDED RELEASE TRANSDERMAL
Qty: 24 PATCH | Refills: 3 | Status: SHIPPED | OUTPATIENT
Start: 2024-07-18

## 2024-07-18 NOTE — TELEPHONE ENCOUNTER
Refill Routing Note   Medication(s) are not appropriate for processing by [facility] for the following reason(s):        Outside of protocol    [facility] action(s):  Route               Appointments  past 12m or future 3m with PCP    Date Provider   Last Visit   6/5/2023 Rachell Brewster MD   Next Visit   Visit date not found Rachell Brewster MD   ED visits in past 90 days: 0        Note composed:6:30 AM 07/18/2024

## 2024-08-07 ENCOUNTER — HOSPITAL ENCOUNTER (OUTPATIENT)
Dept: RADIOLOGY | Facility: HOSPITAL | Age: 64
Discharge: HOME | End: 2024-08-07
Attending: PODIATRIST
Payer: COMMERCIAL

## 2024-08-07 ENCOUNTER — OFFICE VISIT (OUTPATIENT)
Dept: PODIATRY | Facility: CLINIC | Age: 64
End: 2024-08-07
Payer: COMMERCIAL

## 2024-08-07 VITALS — BODY MASS INDEX: 36.81 KG/M2 | WEIGHT: 201.25 LBS

## 2024-08-07 DIAGNOSIS — G57.62 MORTON'S METATARSALGIA, NEURALGIA, OR NEUROMA, LEFT: ICD-10-CM

## 2024-08-07 DIAGNOSIS — M20.41 HAMMER TOES OF BOTH FEET: ICD-10-CM

## 2024-08-07 DIAGNOSIS — M20.5X1 HALLUX LIMITUS, ACQUIRED, RIGHT: ICD-10-CM

## 2024-08-07 DIAGNOSIS — M20.42 HAMMER TOES OF BOTH FEET: ICD-10-CM

## 2024-08-07 DIAGNOSIS — M20.5X2 HALLUX LIMITUS, ACQUIRED, LEFT: ICD-10-CM

## 2024-08-07 DIAGNOSIS — M79.672 FOOT PAIN, LEFT: Primary | ICD-10-CM

## 2024-08-07 DIAGNOSIS — M79.672 FOOT PAIN, LEFT: ICD-10-CM

## 2024-08-07 PROCEDURE — 73630 X-RAY EXAM OF FOOT: CPT | Mod: TC,FY,PO,LT

## 2024-08-07 PROCEDURE — 1101F PT FALLS ASSESS-DOCD LE1/YR: CPT | Mod: CPTII,S$GLB,, | Performed by: PODIATRIST

## 2024-08-07 PROCEDURE — 99999 PR PBB SHADOW E&M-EST. PATIENT-LVL IV: CPT | Mod: PBBFAC,,, | Performed by: PODIATRIST

## 2024-08-07 PROCEDURE — 1160F RVW MEDS BY RX/DR IN RCRD: CPT | Mod: CPTII,S$GLB,, | Performed by: PODIATRIST

## 2024-08-07 PROCEDURE — 73630 X-RAY EXAM OF FOOT: CPT | Mod: 26,LT,, | Performed by: RADIOLOGY

## 2024-08-07 PROCEDURE — 99203 OFFICE O/P NEW LOW 30 MIN: CPT | Mod: S$GLB,,, | Performed by: PODIATRIST

## 2024-08-07 PROCEDURE — 1159F MED LIST DOCD IN RCRD: CPT | Mod: CPTII,S$GLB,, | Performed by: PODIATRIST

## 2024-08-07 PROCEDURE — 3008F BODY MASS INDEX DOCD: CPT | Mod: CPTII,S$GLB,, | Performed by: PODIATRIST

## 2024-08-07 PROCEDURE — 3288F FALL RISK ASSESSMENT DOCD: CPT | Mod: CPTII,S$GLB,, | Performed by: PODIATRIST

## 2024-08-07 NOTE — PATIENT INSTRUCTIONS
Recommend over the counter medicine for nerve micheline:  - Capsaicin cream to rub on at night  - Alpha lipoic acid 600 mg Cap; Take 1 capsule by mouth once daily for neuropathy or a B complex vitamin daily    Over the counter pain creams: Voltaren Gel, Biofreeze, Bengay, tiger balm, two old goat, lidocaine gel,  Absorbine Veterinary Liniment Gel Topical Analgesic Sore Muscle and Joint Pain Relief    Recommend lotions: eucerin, eucerin for diabetics, aquaphor, A&D ointment, gold bond for diabetics, sween, Burleson's Bees all purpose baby ointment,  urea 40 with aloe or SkinIntegra rapid crack repair (found on amazon.com)    Shoe recommendations: (try 6pm.Salt Rights, zappos.Salt Rights , [employer], or shoes.Salt Rights for discounted prices) you can visit varsity shoes in [place], DSW shoes in [place]  or laurence rack in the [facility] (there are also several shoe brand outlets in the [facility])    ONLY purchase stability style tennis shoes AVOID flex, foam, free, yoga mat style shoes or shoes that claim to feel like clouds  If you have a flatter foot purchase shoes for PRONATION  If you have a high arch purchase shoes for SUPINATION    Shoe examples:    Asics (GT or gel foundations, gel-kayano-30), new balance stability type shoes (such as the 940 series or the [id number]), saucony (Guide 16, stabil c3),  [place] (GTS or Beast or   transcend), propet, HokaOne (olvin 7, arahi, dora) Henri (tennis shoes and boots)    Sofft Pj (women) Danielle&Tan (men), clarks, crocs, aerosoles, naturalizers, SAS, ecco, born, lulu mata, rockchristo (dress shoes)    Vionic, burkenstocks, fitflops, propet, taos, baretraps, Hoka or vionic recovery slides/sandals (sandals)    Hoka or vionic recovery slides/sandals, crocs, propet (house shoes)      Nail Home remedy:  Vicks Vapor rub or Emuaid to nails for easier manageability    Occasional soaks for 15-20 mins in luke warm water with 1/2 cup of listerine and 1 cup of apple cider vinegar are ok You  "may add several drops of oil of oregano or tea tree oil as well    What is Metatarsalgia?  Metatarsalgia is pain in the ball of your foot. It is often caused by wearing shoes with thin soles and high heels. This puts extra pressure on the bones in the ball of the foot. Standing or walking on a hard surface for long periods also puts added pressure on the bones, causing pain. The pain can occur under any of the five metatarsal bones, although it's most common in the second. Bent or twisted toes and bunions can make the problem worse. So can being overweight. Sometimes high arches or arthritis can also cause metatarsalgia.    Inside the ball of your foot  The long bones in the middle of your foot are called the metatarsal bones. Each metatarsal bone ends in the ball of the foot. When you walk, these bones bear the weight of your body as your foot pushes off the ground. If there is more pressure on the end of one bone, it presses on the skin beneath it. This causes pain and inflammation in the ball of the foot (metatarsalgia). A callus (a hard growth of skin) may also form on the ball of the foot.    Symptoms  The most common symptom of metatarsalgia is pain in the ball of the foot. It may feel as if you have a stone in your shoe. The ball of the foot may also become red and inflamed, and a callus may form under the end of the metatarsal bone.   Date Last Reviewed: 9/29/2015  © 3027-5410 [employer]. [address] [place] [address], [place] [address]. All rights reserved. This information is not intended as a substitute for professional medical care. Always follow your healthcare professional's instructions.        Treating Metatarsalgia    Metatarsalgia is pain in the "ball of your foot," the area between your arch and your toes. The pain usually starts in one or more of the five bones in this area under the toes. These bones are called the metatarsals. Often, a callus builds up in this area. In most cases, " wearing low-heeled, well-cushioned shoes and filing down the callus will ease the pain. If these steps dont work, you may need surgery to remove part of the bone. To take pressure off the ball of your foot and ease the pain, your healthcare provider may suggest that you do one or more of the following.  Wear shoes with padded soles  Wear shoes with thick padding in the soles. Keep heels low. Make sure the shoe is wide across the ball of your foot and your toes.  Using a metatarsal pad  Put a metatarsal pad in your shoe. This lifts and takes pressure off the painful area. To insert the pad:  Put a small lipstick teresa on the callus.  Step into the shoe to leave a teresa on the insole.  Peel the backing off the pad. Then put the pad in the shoe just behind the lipstick teresa.  You may also be able to find inserts with built-in metatarsal pads.  Filing the callus  Soak your foot in warm water for a few minutes to soften the skin.  Dry the foot.  Gently rub the callus with a pumice stone or nail file to remove the hard skin. Stop if bleeding or pain occurs.  If you have diabetes, see your diabetes healthcare provider for foot care.  Date Last Reviewed: 9/29/2015 © 2000-2017 [employer]. [address] [place] [address]. All rights reserved. This information is not intended as a substitute for professional medical care. Always follow your healthcare professional's instructions.        Wearing Proper Shoes                    You walk on your feet every day, forcing them to support the weight of your body. Repeated stress on your feet can cause damage over time. The right shoes can help protect your feet. The wrong shoes can cause more foot problems. Read the information below to help you find a shoe that fits your foot needs.      A good shoe fit will cover your foot outline. A shoe that doesnt cover the outline is a bad fit.   Whats your foot shape?  To get a good fit, you need to know the shape of  your foot. Do this simple test: While standing, place your foot on a piece of paper and trace around it. Is your foot straight or curved? Do you have a foot problem, such as a bunion, that causes your foot outline to show a bulge on the side of your big toe?  Finding your fit  Bring your foot outline to the shoe store to help you find the right shoe. Place a shoe you like on top of the outline to see if it matches the shape. The shoe should cover the outline. (If you have a bunion, the shoe may not cover the bulge on the outline. Look for soft leather shoes to stretch over the bunion.) Once youve found a pair of proper shoes, put them on. Walk around. Be sure the shoes dont rub or pinch. If the shoes feel good, youve found your fit!  The right shoe for you  A good shoe has features that provide comfort and support. It must also be the right size and shape for your feet. Look for a shoe made of breathable fabric and lining, such as leather or canvas. Be sure that shoes have enough tread to prevent slipping. Go to a good shoe store for help finding the right shoe.  Good shoe features  An ideal shoe has the following:  Laces for support. If tying laces is a problem for you, try shoes with Velcro fasteners or brenda.  A front of the shoe (toe box) with ½ inch space in front of your longest toes.  An arch shape that supports your foot.  No more than 1½ inches of heel.  A stiff, snug back of the shoe to keep your foot from sliding around.  A smooth lining with no rough seams.  Shoe shopping tips  Below are some dos and donts for when you go to the shoe store.  Do:  Select the shoes that feel right. Wear them around the house. Then bring them to your foot healthcare provider to check for fit. If they dont fit well, return them.  Shop late in the day, when your feet will be slightly bigger.  Each time you buy shoes, have both your feet measured while you are standing. Foot size changes with time.  Pick shoes to suit  their purpose. High heels are OK for an occasional night on the town. But for everyday wear, choose a more sensible shoe.  Try on shoes while wearing any inserts specially made for your feet (orthoses).  Try on both the right and left shoes. If your feet are different sizes, pick a pair that fits the larger foot.  Dont:  Dont buy shoes based on shoe size alone. Always try on shoes, as sizes differ from brand to brand and within brands.  Dont expect shoes to break in. If they dont fit at the store, dont buy them.  Dont buy a shoe that doesnt match your foot shape.  What about socks?  Always wear socks with shoes. Socks help absorb sweat and reduce friction and blistering. When shopping for shoes, choose soft, padded socks with seams that dont irritate your feet.  If you have foot problems  Some foot problems cause deformities. This can make it hard to find a good fit. Look for shoes made of soft leather to stretch over the deformity. If you have bunions, buy shoes with a wider toe box. To fit hammertoes, look for shoes with a tall toe box. If you have arch problems, you may need inserts. In some cases, youll need to have custom footwear or orthoses made for your feet.  Suggested footwear  Ask your healthcare provider what kind of footwear you need. He or she may recommend a certain brand or shoe store.  Date Last Reviewed: 8/1/2016  © 8890-0639 [employer]. [address] [place] [address], [place] [address]. All rights reserved. This information is not intended as a substitute for professional medical care. Always follow your healthcare professional's instructions.

## 2024-08-07 NOTE — PROGRESS NOTES
Subjective:      Patient ID: Rogelio Davila is a 64 y.o. female.    Chief Complaint: Foot Pain    Rogelio Davila is a 64 y.o. female who presents to the podiatry clinic  with complaint of  left foot pain, especially with palpation and ambulation. Description: moderate Nature: aching, burning, sharp, and throbbing Location: forefoot Onset of the symptoms was several weeks ago. Precipitating event: none known.  Likely secondary to shoes.  Wore keds avidly, switched to vionic sandals with some relief. History of injury: no Current symptoms include: primarily pain to 2nd MTPJ and interspace.  Initially pain was more defuse but relieved with steroid. Patients rates pain 5/10 on pain scale.    Patient Active Problem List   Diagnosis    Obesity    Menopause present    BPPV (benign paroxysmal positional vertigo), left    Impaired functional mobility, balance, and endurance    Decreased ROM of neck    Attention deficit hyperactivity disorder (ADHD), predominantly inattentive type    Adjustment disorder with mixed anxiety and depressed mood       Current Outpatient Medications on File Prior to Visit   Medication Sig Dispense Refill    aspirin (ECOTRIN) 81 MG EC tablet once daily.      biotin 1 mg tablet Take 1,000 mcg by mouth.      calcium carbonate (OS-HECTOR) 600 mg calcium (1,500 mg) Tab Take 600 mg by mouth once.      cetirizine (ZYRTEC) 10 MG tablet Take 10 mg by mouth.      co-enzyme Q-10 30 mg capsule Take 100 mg by mouth.      EScitalopram oxalate (LEXAPRO) 10 MG tablet Take 10 mg by mouth once daily.      estradioL (IMVEXXY STARTER PACK) 10 mcg Ink Place 10 mcg vaginally once daily. Place daily x 2 weeks then twice weekly 18 each 0    estradiol 0.05 mg/24 hr td ptsw (VIVELLE-DOT) 0.05 mg/24 hr APPLY 1 PATCH TOPICALLY TO THE SKIN 2 TIMES A WEEK 24 patch 3    hydrochlorothiazide (HYDRODIURIL) 25 MG tablet Take 25 mg by mouth once daily.       krill oil 500 mg Cap Take 1 capsule by mouth.      meloxicam (MOBIC) 15 MG  tablet Take 15 mg by mouth daily as needed.      metoprolol tartrate (LOPRESSOR) 25 MG tablet Take 1 tablet by mouth 2 (two) times daily.      miscellaneous medical supply (C-TUB) Misc CPAP auto PAP 5-20 mm water.  Please include mask, tubes, filters.      MULTIVITAMIN ORAL Take by mouth.      naproxen (EC NAPROSYN) 500 MG EC tablet TAKE 1 TABLET(500 MG) BY MOUTH TWICE DAILY WITH MEALS 60 tablet 0    progesterone (PROMETRIUM) 200 MG capsule TAKE ONE CAPSULE BY MOUTH EVERY EVENING 90 capsule 0    estradioL (IMVEXXY MAINTENANCE PACK) 10 mcg Inst Place 10 mcg vaginally twice a week. 8 each 11    progesterone (PROMETRIUM) 100 MG capsule Take 2 capsules (200 mg total) by mouth nightly. 180 capsule 1     No current facility-administered medications on file prior to visit.       Review of patient's allergies indicates:  No Known Allergies    Past Surgical History:   Procedure Laterality Date     SECTION      x2    COLPOSCOPY         Family History   Problem Relation Name Age of Onset    Breast cancer Paternal Aunt  58    Cancer Maternal Grandfather          lung    Diabetes Maternal Grandfather      Stroke Father  62    Hypertension Father      Heart attacks under age 50 Father          smoker, drinker    Hypertension Mother      Melanoma Neg Hx      Ovarian cancer Neg Hx      Colon cancer Neg Hx         Social History     Socioeconomic History    Marital status:    Tobacco Use    Smoking status: Former     Current packs/day: 0.00     Types: Cigarettes     Quit date: 2013     Years since quittin.1    Smokeless tobacco: Former   Substance and Sexual Activity    Alcohol use: Yes     Comment: occasionally    Drug use: No    Sexual activity: Not Currently     Comment:    Other Topics Concern    Are you pregnant or think you may be? No     Social Determinants of Health     Financial Resource Strain: Medium Risk (2024)    Overall Financial Resource Strain (CARDIA)     Difficulty of Paying  Living Expenses: Somewhat hard   Food Insecurity: No Food Insecurity (8/6/2024)    Hunger Vital Sign     Worried About Running Out of Food in the Last Year: Never true     Ran Out of Food in the Last Year: Never true   Transportation Needs: No Transportation Needs (12/4/2022)    [facility]     Lack of Transportation (Medical): No     Lack of Transportation (Non-Medical): No   Physical Activity: Insufficiently Active (8/6/2024)    Exercise Vital Sign     Days of Exercise per Week: 2 days     Minutes of Exercise per Session: 20 min   Stress: No Stress Concern Present (8/6/2024)    [employer] [employer] [facility] - Occupational Stress Questionnaire     Feeling of Stress : Only a little   Housing Stability: Unknown (8/6/2024)    Housing Stability Vital Sign     Unable to Pay for Housing in the Last Year: No       Review of Systems   Constitutional: Negative for chills and fever.   Cardiovascular:  Negative for claudication and leg swelling.   Respiratory:  Negative for cough and shortness of breath.    Skin:  Negative for itching and rash.   Musculoskeletal:  Positive for joint pain, myalgias and stiffness. Negative for falls, joint swelling and muscle weakness.   Gastrointestinal:  Negative for diarrhea, nausea and vomiting.   Neurological:  Positive for paresthesias. Negative for numbness, tremors and weakness.   Psychiatric/Behavioral:  Negative for altered mental status and hallucinations.            Objective:      Vitals:    08/07/24 0945   Weight: 91.3 kg (201 lb 4.5 oz)   PainSc:   5   PainLoc: Foot       Physical Exam  Vitals and nursing note reviewed.   Constitutional:       General: She is not in acute distress.     Appearance: She is well-developed. She is not toxic-appearing or diaphoretic.      Comments: alert and oriented x 3.    Cardiovascular:      Pulses:           Dorsalis pedis pulses are 2+ on the right side and 2+ on the left side.         Posterior tibial pulses are 2+ on the right side and 2+ on the left side.      Comments:  Capillary refill time is within normal limits. Digital hair present.   Pulmonary:      Effort: No respiratory distress.   Musculoskeletal:         General: No deformity.      Right ankle: No tenderness. No lateral malleolus, medial malleolus, AITF ligament, CF ligament or posterior TF ligament tenderness.      Right Achilles Tendon: No defects. Ontiveros's test negative.      Left ankle: No tenderness. No lateral malleolus, medial malleolus, AITF ligament, CF ligament or posterior TF ligament tenderness.      Left Achilles Tendon: No defects. Ontiveros's test negative.      Right foot: No tenderness or bony tenderness.      Left foot: Tenderness (pain on palpation of the 2nd and 3rd  intermetatarsal space with tenderness to palpation of the adjacent metatarsal heads) present. No bony tenderness.      Comments: There is equinus deformity bilateral with decreased dorsiflexion at the ankle joint bilateral    Decreased first MPJ range of motion both weightbearing and nonweightbearing, no crepitus observed the first MP joint, + dorsal flag sign. Mild  bunion deformity is observed .    Patient has hammertoes of digits 2-5 bilateral partially reducible     Muscle strength is 5/5 in all groups bilaterally.           Feet:      Right foot:      Protective Sensation: 5 sites tested.  5 sites sensed.      Skin integrity: Skin integrity normal.      Left foot:      Protective Sensation: 5 sites tested.  5 sites sensed.      Skin integrity: Skin integrity normal.   Lymphadenopathy:      Comments: No lymphatic streaking     Skin:     General: Skin is warm and dry.      Coloration: Skin is not pale.      Findings: No rash.      Nails: There is no clubbing.      Comments: Skin is of normal turgor.   Normal temperature gradient.  Examination of the skin reveals no evidence of significant rashes, open lesions, suspicious appearing nevi or other  concerning lesions.    Neurological:      Sensory: No sensory deficit.      Motor: No atrophy.      Comments: Light touch present     Psychiatric:         Attention and Perception: She is attentive.         Mood and Affect: Mood is not anxious. Affect is not inappropriate.         Speech: She is communicative. Speech is not slurred.         Behavior: Behavior is not combative.               Assessment:       Encounter Diagnoses   Name Primary?    Foot pain, left Yes    Kimball's metatarsalgia, neuralgia, or neuroma, left     Hammer toes of both feet     Hallux limitus, acquired, left     Hallux limitus, acquired, right          Plan:     Problem List Items Addressed This Visit    None  Visit Diagnoses       Foot pain, left    -  Primary    Relevant Orders    X-Ray Foot Complete Left (Completed)    Kimball's metatarsalgia, neuralgia, or neuroma, left        Hammer toes of both feet        Hallux limitus, acquired, left        Hallux limitus, acquired, right                 I counseled the patient on her conditions, their implications and medical management.      -- Conservative treatments for hammer digit discussed include padding, hammertoe crest  Pads, extra-depth shoes; Surgical treatments discussed, including hammertoe correction and generic post-op course. All questions answered.   Patient opting to begin with conservative options first.      -- Patient instructed on adequate icing techniques. Patient should ice the affected area at least once per day x 10 minutes for 10 days . I advised the  patient that extra icing would also be beneficial to ensure adequate anti inflammatory effect. /    --Dispensed information on proper shoe fit and modification including inserts, met pads.    --RTC PRN.

## 2024-08-22 ENCOUNTER — PATIENT MESSAGE (OUTPATIENT)
Dept: PODIATRY | Facility: CLINIC | Age: 64
End: 2024-08-22
Payer: COMMERCIAL

## 2024-10-01 DIAGNOSIS — Z78.0 MENOPAUSE: ICD-10-CM

## 2024-10-01 RX ORDER — PROGESTERONE 200 MG/1
200 CAPSULE ORAL NIGHTLY
Qty: 90 CAPSULE | Refills: 0 | Status: SHIPPED | OUTPATIENT
Start: 2024-10-01

## 2024-10-01 NOTE — TELEPHONE ENCOUNTER
Refill Routing Note   Medication(s) are not appropriate for processing by [facility] for the following reason(s):        Patient not seen by provider within 15 months    [facility] action(s):  Defer               Appointments  past 12m or future 3m with PCP    Date Provider   Last Visit   Visit date not found Rachell Brewster MD   Next Visit   Visit date not found Rachell Brewster MD   ED visits in past 90 days: 0        Note composed:4:26 PM 10/01/2024

## 2024-10-21 ENCOUNTER — PATIENT MESSAGE (OUTPATIENT)
Dept: OBSTETRICS AND GYNECOLOGY | Facility: CLINIC | Age: 64
End: 2024-10-21
Payer: COMMERCIAL

## 2024-10-21 ENCOUNTER — TELEPHONE (OUTPATIENT)
Dept: OBSTETRICS AND GYNECOLOGY | Facility: CLINIC | Age: 64
End: 2024-10-21
Payer: COMMERCIAL

## 2024-10-21 NOTE — TELEPHONE ENCOUNTER
----- Message from Angely sent at 10/21/2024  4:34 PM CDT -----  Pt called said she missed a call from the office she can reached at [phone]

## 2024-10-22 ENCOUNTER — TELEPHONE (OUTPATIENT)
Dept: OBSTETRICS AND GYNECOLOGY | Facility: CLINIC | Age: 64
End: 2024-10-22
Payer: COMMERCIAL

## 2024-10-22 NOTE — TELEPHONE ENCOUNTER
----- Message from Fylet sent at 10/22/2024 12:06 PM CDT -----      Name of Who is Calling: MANJIT LEMON [[id number]]      What is the request in detail: Pt returned call to the office.Please contact to further discuss and advise.          Can the clinic reply by [facility]: Y      [facility]: [phone]

## 2024-10-22 NOTE — TELEPHONE ENCOUNTER
Spoke with patient and offered her an appointment with the walk-in clinic for an immediate visit. She says her bleeding has stopped and that she will wait until Dr. Brewster is back in office. Scheduled her for Dec 12

## 2024-12-04 ENCOUNTER — HOSPITAL ENCOUNTER (OUTPATIENT)
Dept: RADIOLOGY | Facility: HOSPITAL | Age: 64
Discharge: HOME | End: 2024-12-04
Attending: INTERNAL MEDICINE
Payer: COMMERCIAL

## 2024-12-04 DIAGNOSIS — Z12.31 ENCOUNTER FOR SCREENING MAMMOGRAM FOR BREAST CANCER: ICD-10-CM

## 2024-12-04 PROCEDURE — 77063 BREAST TOMOSYNTHESIS BI: CPT | Mod: TC,PO

## 2024-12-12 ENCOUNTER — OFFICE VISIT (OUTPATIENT)
Dept: OBSTETRICS AND GYNECOLOGY | Facility: CLINIC | Age: 64
End: 2024-12-12
Attending: OBSTETRICS & GYNECOLOGY
Payer: COMMERCIAL

## 2024-12-12 VITALS
WEIGHT: 215.81 LBS | DIASTOLIC BLOOD PRESSURE: 76 MMHG | HEIGHT: 62 IN | BODY MASS INDEX: 39.71 KG/M2 | SYSTOLIC BLOOD PRESSURE: 162 MMHG

## 2024-12-12 DIAGNOSIS — N95.0 PMB (POSTMENOPAUSAL BLEEDING): ICD-10-CM

## 2024-12-12 DIAGNOSIS — N95.1 MENOPAUSAL SYNDROME: ICD-10-CM

## 2024-12-12 DIAGNOSIS — Z01.419 ENCOUNTER FOR GYNECOLOGICAL EXAMINATION WITHOUT ABNORMAL FINDING: Primary | ICD-10-CM

## 2024-12-12 DIAGNOSIS — Z78.0 MENOPAUSE: ICD-10-CM

## 2024-12-12 PROCEDURE — 99999 PR PBB SHADOW E&M-EST. PATIENT-LVL III: CPT | Mod: PBBFAC,,, | Performed by: OBSTETRICS & GYNECOLOGY

## 2024-12-12 RX ORDER — PROGESTERONE 200 MG/1
200 CAPSULE ORAL NIGHTLY
Qty: 90 CAPSULE | Refills: 3 | Status: SHIPPED | OUTPATIENT
Start: 2024-12-12 | End: 2024-12-12

## 2024-12-12 RX ORDER — PROGESTERONE 200 MG/1
200 CAPSULE ORAL NIGHTLY
Qty: 90 CAPSULE | Refills: 3 | Status: SHIPPED | OUTPATIENT
Start: 2024-12-12

## 2024-12-12 RX ORDER — ESTRADIOL 0.05 MG/D
1 FILM, EXTENDED RELEASE TRANSDERMAL
Qty: 24 PATCH | Refills: 3 | Status: SHIPPED | OUTPATIENT
Start: 2024-12-12

## 2024-12-12 NOTE — PROGRESS NOTES
Subjective:       Patient ID: Rogelio Davila is a 64 y.o. female.    Chief Complaint:  Gynecologic Exam and postmenopausal bleeding      History of Present Illness  Gynecologic Exam  Pertinent negatives include no abdominal pain, back pain or headaches.       Rogelio Davila is a 64 y.o. female  here for her annual GYN exam.  She is menopausal since age 50 and is on HRT, reports had several days of bleeding in November when she ran out of her Estradiol patches, then resumed them.  Hx of endometrial polyp removed with in office Hysteroscopy in .  Current HRT:   Vivelle Dot 0.05  Prometrium 200mg HS  Of note, she has been diagnosed with Polymyalgia Rheumatica and has been on high dose steroids    denies vaginal itching or irritation.  Denies vaginal discharge.  She is not sexually active.      History of abnormal pap: Yes - years ago  Last Pap: approximate date 2023 and was normal(neg HR HPV)  Last MMG: normal-2024: BI-RADS Category 1: Negative -routine follow-up in 12 months  Last Colonoscopy:  : normal  denies domestic violence. She does feel safe at home. (Her son lives with her)    Past Medical History:   Diagnosis Date    Abnormal Pap smear of cervix     Hyperlipidemia     Hypertension      Past Surgical History:   Procedure Laterality Date     SECTION      x2    COLPOSCOPY       Social History     Socioeconomic History    Marital status:    Tobacco Use    Smoking status: Former     Current packs/day: 0.00     Types: Cigarettes     Quit date: 2013     Years since quittin.4    Smokeless tobacco: Former   Substance and Sexual Activity    Alcohol use: Yes     Comment: occasionally    Drug use: No    Sexual activity: Not Currently     Comment:    Other Topics Concern    Are you pregnant or think you may be? No     Social Drivers of Health     Financial Resource Strain: Medium Risk (2024)    Overall Financial Resource Strain (CARDIA)     Difficulty of  "Paying Living Expenses: Somewhat hard   Food Insecurity: No Food Insecurity (2024)    Hunger Vital Sign     Worried About Running Out of Food in the Last Year: Never true     Ran Out of Food in the Last Year: Never true   Transportation Needs: No Transportation Needs (2022)    [facility]    [facility] - Transportation     Lack of Transportation (Medical): No     Lack of Transportation (Non-Medical): No   Physical Activity: Insufficiently Active (2024)    Exercise Vital Sign     Days of Exercise per Week: 2 days     Minutes of Exercise per Session: 20 min   Stress: No Stress Concern Present (2024)    [employer] - Occupational Stress Questionnaire     Feeling of Stress : Only a little   Housing Stability: Unknown (2024)    Housing Stability Vital Sign     Unable to Pay for Housing in the Last Year: No     Family History   Problem Relation Name Age of Onset    Breast cancer Paternal Aunt  58    Cancer Maternal Grandfather          lung    Diabetes Maternal Grandfather      Stroke Father  62    Hypertension Father      Heart attacks under age 50 Father          smoker, drinker    Hypertension Mother      Melanoma Neg Hx      Ovarian cancer Neg Hx      Colon cancer Neg Hx       OB History          2    Para   2    Term   2            AB        Living   2         SAB        IAB        Ectopic        Multiple        Live Births   2                 BP (!) 162/76 (Patient Position: Sitting)   Ht 5' 2" (1.575 m)   Wt 97.9 kg (215 lb 12.8 oz)   LMP 2010 (Approximate)   BMI 39.47 kg/m²         GYN & OB History  Patient's last menstrual period was 2010 (approximate).   Date of Last Pap: 2023    OB History    Para Term  AB Living   2 2 2     2   SAB IAB Ectopic Multiple Live Births           2      # Outcome Date GA Lbr Andreas/2nd Weight Sex Type Anes PTL Lv   2 Term      CS-Unspec  N LIDA   1 Term      CS-Unspec  N LIDA "       Review of Systems  Review of Systems   Constitutional:  Negative for activity change, appetite change, fatigue and unexpected weight change.   HENT: Negative.     Eyes:  Negative for visual disturbance.   Respiratory:  Negative for shortness of breath and wheezing.    Cardiovascular:  Negative for chest pain, palpitations and leg swelling.   Gastrointestinal:  Negative for abdominal pain, bloating and blood in stool.   Endocrine: Negative for diabetes and hair loss.   Genitourinary:  Positive for postmenopausal bleeding and vaginal dryness. Negative for decreased libido and hot flashes.   Musculoskeletal:  Negative for back pain and joint swelling.   Integumentary:  Negative for acne, hair changes and nipple discharge.   Neurological:  Negative for headaches.   Hematological:  Does not bruise/bleed easily.   Psychiatric/Behavioral:  Negative for depression and sleep disturbance. The patient is not nervous/anxious.    Breast: Negative for mastodynia and nipple discharge          Objective:      Physical Exam:   Constitutional: She is oriented to person, place, and time. She appears well-developed and well-nourished.    HENT:   Head: Normocephalic and atraumatic.    Eyes: Pupils are equal, round, and reactive to light. EOM are normal.     Cardiovascular:  Normal rate and regular rhythm.             Pulmonary/Chest: Effort normal and breath sounds normal.   BREASTS:  no mass, no tenderness, no deformity and no retraction. Right breast exhibits no inverted nipple, no mass, no nipple discharge, no skin change, no tenderness, no bleeding and no swelling. Left breast exhibits no inverted nipple, no mass, no nipple discharge, no skin change, no tenderness, no bleeding and no swelling. Breasts are symmetrical.              Abdominal: Soft. Bowel sounds are normal.     Genitourinary:    Pelvic exam was performed with patient supine.      Genitourinary Comments: PELVIC: Normal external genitalia without lesions.  Normal  hair distribution.  Adequate perineal body, normal urethral meatus.  Vagina  Dry and poorly rugated, atrophic, without lesions or discharge.  Cervix pink, Nulliparous appearing,without lesions, discharge or tenderness.  No significant cystocele or rectocele.  Bimanual exam shows uterus to be normal size, regular, mobile and nontender.  Adnexa without masses or tenderness.    RECTAL:Deferred                 Musculoskeletal: Normal range of motion and moves all extremeties.       Neurological: She is alert and oriented to person, place, and time.    Skin: Skin is warm and dry.    Psychiatric: She has a normal mood and affect.              Assessment:        1. Encounter for gynecological examination without abnormal finding    2. Menopausal syndrome    3. Menopause    4. PMB (postmenopausal bleeding)                Plan:        Problem List Items Addressed This Visit    None  Visit Diagnoses       Encounter for gynecological examination without abnormal finding    -  Primary    Menopausal syndrome        Relevant Medications    estradiol 0.05 mg/24 hr td ptsw (VIVELLE-DOT) 0.05 mg/24 hr    Menopause        Relevant Medications    progesterone (PROMETRIUM) 200 MG capsule    PMB (postmenopausal bleeding)      Discussed need for evaluation with u/s and possible endometrialsampling. Had an office hysteroscopy previously, if biopsy needed, she wishes to proceed with short stay for anesthesia.    Relevant Orders    US Pelvis Comp with Transvag NON-OB (xpd             Follow up in about 1 year (around 12/12/2025).

## 2024-12-19 ENCOUNTER — HOSPITAL ENCOUNTER (OUTPATIENT)
Dept: RADIOLOGY | Facility: HOSPITAL | Age: 64
Discharge: HOME | End: 2024-12-19
Attending: OBSTETRICS & GYNECOLOGY
Payer: COMMERCIAL

## 2024-12-19 DIAGNOSIS — N95.0 PMB (POSTMENOPAUSAL BLEEDING): ICD-10-CM

## 2024-12-19 PROCEDURE — 76856 US EXAM PELVIC COMPLETE: CPT | Mod: TC

## 2024-12-19 PROCEDURE — 76856 US EXAM PELVIC COMPLETE: CPT | Mod: 26,,, | Performed by: STUDENT IN AN ORGANIZED HEALTH CARE EDUCATION/TRAINING PROGRAM

## 2024-12-20 DIAGNOSIS — N95.2 POSTMENOPAUSAL ATROPHIC VAGINITIS: Primary | ICD-10-CM

## 2024-12-20 DIAGNOSIS — N95.0 PMB (POSTMENOPAUSAL BLEEDING): ICD-10-CM

## 2024-12-20 DIAGNOSIS — R93.89 THICKENED ENDOMETRIUM: ICD-10-CM

## 2024-12-20 RX ORDER — ESTRADIOL 0.1 MG/G
1 CREAM VAGINAL
Qty: 42 G | Refills: 3 | Status: SHIPPED | OUTPATIENT
Start: 2024-12-23

## 2024-12-20 NOTE — PROGRESS NOTES
Please schedule for Preops . I have scheduled her for hysteroscopy D&C for January 16. 2.5 Mm Punch Excision Text: A 2.5 mm punch biopsy was used to excise the lesion to the level of the subcutaneous fat.  Blunt dissection was used to free the lesion from the surrounding tissues and the lesion was removed.

## 2024-12-26 ENCOUNTER — TELEPHONE (OUTPATIENT)
Dept: OBSTETRICS AND GYNECOLOGY | Facility: CLINIC | Age: 64
End: 2024-12-26
Payer: COMMERCIAL

## 2024-12-26 NOTE — TELEPHONE ENCOUNTER
----- Message from Rachell Brewster MD sent at 12/20/2024  9:34 AM CST -----  Please schedule for Preops . I have scheduled her for hysteroscopy D&C for January 16.

## 2024-12-26 NOTE — TELEPHONE ENCOUNTER
Called patient assisted with scheduling pre op and pre admit appointments on 01/14/25. Patient said thanks.

## 2025-01-10 ENCOUNTER — ANESTHESIA EVENT (OUTPATIENT)
Dept: SURGERY | Facility: OTHER | Age: 65
End: 2025-01-10
Payer: COMMERCIAL

## 2025-01-10 RX ORDER — ACETAMINOPHEN 500 MG
1000 TABLET ORAL
Status: CANCELLED | OUTPATIENT
Start: 2025-01-10 | End: 2025-01-10

## 2025-01-10 RX ORDER — SODIUM CHLORIDE, SODIUM LACTATE, POTASSIUM CHLORIDE, CALCIUM CHLORIDE 600; 310; 30; 20 MG/100ML; MG/100ML; MG/100ML; MG/100ML
INJECTION, SOLUTION INTRAVENOUS CONTINUOUS
Status: CANCELLED | OUTPATIENT
Start: 2025-01-10

## 2025-01-10 RX ORDER — LIDOCAINE HYDROCHLORIDE 10 MG/ML
0.5 INJECTION, SOLUTION EPIDURAL; INFILTRATION; INTRACAUDAL; PERINEURAL ONCE
Status: CANCELLED | OUTPATIENT
Start: 2025-01-10 | End: 2025-01-10

## 2025-01-14 ENCOUNTER — OFFICE VISIT (OUTPATIENT)
Dept: OBSTETRICS AND GYNECOLOGY | Facility: CLINIC | Age: 65
End: 2025-01-14
Attending: OBSTETRICS & GYNECOLOGY
Payer: COMMERCIAL

## 2025-01-14 ENCOUNTER — HOSPITAL ENCOUNTER (OUTPATIENT)
Dept: PREADMISSION TESTING | Facility: OTHER | Age: 65
Discharge: HOME | End: 2025-01-14
Attending: OBSTETRICS & GYNECOLOGY
Payer: COMMERCIAL

## 2025-01-14 VITALS
OXYGEN SATURATION: 97 % | HEART RATE: 67 BPM | WEIGHT: 215.81 LBS | DIASTOLIC BLOOD PRESSURE: 78 MMHG | SYSTOLIC BLOOD PRESSURE: 130 MMHG | BODY MASS INDEX: 39.71 KG/M2 | HEIGHT: 62 IN | TEMPERATURE: 98 F

## 2025-01-14 VITALS
WEIGHT: 215.81 LBS | HEIGHT: 62 IN | DIASTOLIC BLOOD PRESSURE: 83 MMHG | SYSTOLIC BLOOD PRESSURE: 153 MMHG | BODY MASS INDEX: 39.71 KG/M2

## 2025-01-14 DIAGNOSIS — Z01.818 PRE-OP TESTING: Primary | ICD-10-CM

## 2025-01-14 DIAGNOSIS — N88.2 CERVICAL STENOSIS (UTERINE CERVIX): ICD-10-CM

## 2025-01-14 DIAGNOSIS — Z01.818 PREOPERATIVE EXAM FOR GYNECOLOGIC SURGERY: Primary | ICD-10-CM

## 2025-01-14 DIAGNOSIS — N95.0 PMB (POSTMENOPAUSAL BLEEDING): ICD-10-CM

## 2025-01-14 LAB
BASOPHILS # BLD AUTO: 0.03 K/UL (ref 0–0.2)
BASOPHILS NFR BLD: 0.6 % (ref 0–1.9)
DIFFERENTIAL METHOD BLD: NORMAL
EOSINOPHIL # BLD AUTO: 0.1 K/UL (ref 0–0.5)
EOSINOPHIL NFR BLD: 2.4 % (ref 0–8)
ERYTHROCYTE [DISTWIDTH] IN BLOOD BY AUTOMATED COUNT: 13.1 % (ref 11.5–14.5)
HCT VFR BLD AUTO: 43.2 % (ref 37–48.5)
HGB BLD-MCNC: 14.4 G/DL (ref 12–16)
IMM GRANULOCYTES # BLD AUTO: 0.01 K/UL (ref 0–0.04)
IMM GRANULOCYTES NFR BLD AUTO: 0.2 % (ref 0–0.5)
LYMPHOCYTES # BLD AUTO: 1.4 K/UL (ref 1–4.8)
LYMPHOCYTES NFR BLD: 26.1 % (ref 18–48)
MCH RBC QN AUTO: 30.6 PG (ref 27–31)
MCHC RBC AUTO-ENTMCNC: 33.3 G/DL (ref 32–36)
MCV RBC AUTO: 92 FL (ref 82–98)
MONOCYTES # BLD AUTO: 0.5 K/UL (ref 0.3–1)
MONOCYTES NFR BLD: 9.8 % (ref 4–15)
NEUTROPHILS # BLD AUTO: 3.3 K/UL (ref 1.8–7.7)
NEUTROPHILS NFR BLD: 60.9 % (ref 38–73)
NRBC BLD-RTO: 0 /100 WBC
PLATELET # BLD AUTO: 182 K/UL (ref 150–450)
PMV BLD AUTO: 12.2 FL (ref 9.2–12.9)
RBC # BLD AUTO: 4.7 M/UL (ref 4–5.4)
WBC # BLD AUTO: 5.4 K/UL (ref 3.9–12.7)

## 2025-01-14 PROCEDURE — 3079F DIAST BP 80-89 MM HG: CPT | Mod: CPTII,S$GLB,, | Performed by: OBSTETRICS & GYNECOLOGY

## 2025-01-14 PROCEDURE — 3077F SYST BP >= 140 MM HG: CPT | Mod: CPTII,S$GLB,, | Performed by: OBSTETRICS & GYNECOLOGY

## 2025-01-14 PROCEDURE — 1101F PT FALLS ASSESS-DOCD LE1/YR: CPT | Mod: CPTII,S$GLB,, | Performed by: OBSTETRICS & GYNECOLOGY

## 2025-01-14 PROCEDURE — 1159F MED LIST DOCD IN RCRD: CPT | Mod: CPTII,S$GLB,, | Performed by: OBSTETRICS & GYNECOLOGY

## 2025-01-14 PROCEDURE — 1160F RVW MEDS BY RX/DR IN RCRD: CPT | Mod: CPTII,S$GLB,, | Performed by: OBSTETRICS & GYNECOLOGY

## 2025-01-14 PROCEDURE — 85025 COMPLETE CBC W/AUTO DIFF WBC: CPT | Performed by: OBSTETRICS & GYNECOLOGY

## 2025-01-14 PROCEDURE — 1126F AMNT PAIN NOTED NONE PRSNT: CPT | Mod: CPTII,S$GLB,, | Performed by: OBSTETRICS & GYNECOLOGY

## 2025-01-14 PROCEDURE — 3008F BODY MASS INDEX DOCD: CPT | Mod: CPTII,S$GLB,, | Performed by: OBSTETRICS & GYNECOLOGY

## 2025-01-14 PROCEDURE — 3288F FALL RISK ASSESSMENT DOCD: CPT | Mod: CPTII,S$GLB,, | Performed by: OBSTETRICS & GYNECOLOGY

## 2025-01-14 PROCEDURE — 99213 OFFICE O/P EST LOW 20 MIN: CPT | Mod: S$GLB,,, | Performed by: OBSTETRICS & GYNECOLOGY

## 2025-01-14 PROCEDURE — 99999 PR PBB SHADOW E&M-EST. PATIENT-LVL III: CPT | Mod: PBBFAC,,, | Performed by: OBSTETRICS & GYNECOLOGY

## 2025-01-14 RX ORDER — MISOPROSTOL 200 UG/1
200 TABLET ORAL EVERY 12 HOURS
Qty: 2 TABLET | Refills: 0 | Status: ON HOLD | OUTPATIENT
Start: 2025-01-14 | End: 2025-01-16 | Stop reason: HOSPADM

## 2025-01-14 RX ORDER — FAMOTIDINE 20 MG/1
20 TABLET, FILM COATED ORAL
Status: CANCELLED | OUTPATIENT
Start: 2025-01-16

## 2025-01-14 RX ORDER — SODIUM CHLORIDE 9 MG/ML
INJECTION, SOLUTION INTRAVENOUS CONTINUOUS
Status: CANCELLED | OUTPATIENT
Start: 2025-01-16

## 2025-01-14 RX ORDER — MUPIROCIN 20 MG/G
OINTMENT TOPICAL
Status: CANCELLED | OUTPATIENT
Start: 2025-01-16

## 2025-01-14 NOTE — H&P
C.C Pre-op Exam        HPI : Rogelio Davila is a 64 y.o. female  female who presents for preop exam for a Hysteroscopy D&C scheduled on 2025.  She is menopausal since age 50 and is on HRT, reports had several days of bleeding in November when she ran out of her Estradiol patches, then resumed them. Hx of endometrial polyp removed with in office Hysteroscopy in . (Found the procedure very painful, even with local) .           Past Medical History:   Diagnosis Date    Abnormal Pap smear of cervix      Hyperlipidemia      Hypertension              Past Surgical History:   Procedure Laterality Date     SECTION         x2    COLPOSCOPY                 Family History   Problem Relation Name Age of Onset    Breast cancer Paternal Aunt   58    Cancer Maternal Grandfather             lung    Diabetes Maternal Grandfather        Stroke Father   62    Hypertension Father        Heart attacks under age 50 Father             smoker, drinker    Hypertension Mother        Melanoma Neg Hx        Ovarian cancer Neg Hx        Colon cancer Neg Hx          OB History            2    Para   2    Term   2            AB        Living   2           SAB        IAB        Ectopic        Multiple        Live Births   2                  Review of patient's allergies indicates:  No Known Allergies    Current Medications      Current Outpatient Medications:     biotin 1 mg tablet, Take 1,000 mcg by mouth., Disp: , Rfl:     calcium carbonate (OS-HECTOR) 600 mg calcium (1,500 mg) Tab, Take 600 mg by mouth once., Disp: , Rfl:     cetirizine (ZYRTEC) 10 MG tablet, Take 10 mg by mouth., Disp: , Rfl:     estradioL (ESTRACE) 0.01 % (0.1 mg/gram) vaginal cream, Place 1 g vaginally 3 (three) times a week., Disp: 42 g, Rfl: 3    estradiol 0.05 mg/24 hr td ptsw (VIVELLE-DOT) 0.05 mg/24 hr, Place 1 patch onto the skin twice a week., Disp: 24 patch, Rfl: 3    hydrochlorothiazide (HYDRODIURIL) 25 MG tablet, Take 25 mg by  mouth once daily. , Disp: , Rfl:     meloxicam (MOBIC) 15 MG tablet, Take 15 mg by mouth daily as needed., Disp: , Rfl:     metoprolol tartrate (LOPRESSOR) 25 MG tablet, Take 1 tablet by mouth 2 (two) times daily., Disp: , Rfl:     MULTIVITAMIN ORAL, Take by mouth., Disp: , Rfl:     naproxen (EC NAPROSYN) 500 MG EC tablet, TAKE 1 TABLET(500 MG) BY MOUTH TWICE DAILY WITH MEALS, Disp: 60 tablet, Rfl: 0    progesterone (PROMETRIUM) 200 MG capsule, Take 1 capsule (200 mg total) by mouth every evening., Disp: 90 capsule, Rfl: 3    UNABLE TO FIND, Inject as directed Every 15 days. Kevzara injection, Disp: , Rfl:      Social History            Socioeconomic History    Marital status:    Tobacco Use    Smoking status: Former       Current packs/day: 0.00       Types: Cigarettes       Quit date: 2013       Years since quittin.5    Smokeless tobacco: Former   Substance and Sexual Activity    Alcohol use: Yes       Comment: occasionally    Drug use: No    Sexual activity: Not Currently       Comment:    Other Topics Concern    Are you pregnant or think you may be? No      Social Drivers of Health           Financial Resource Strain: Medium Risk (2024)     Overall Financial Resource Strain (CARDIA)      Difficulty of Paying Living Expenses: Somewhat hard   Food Insecurity: No Food Insecurity (2024)     Hunger Vital Sign      Worried About Running Out of Food in the Last Year: Never true      Ran Out of Food in the Last Year: Never true   Transportation Needs: No Transportation Needs (2022)     [facility]      Lack of Transportation (Medical): No      Lack of Transportation (Non-Medical): No   Physical Activity: Insufficiently Active (2024)     Exercise Vital Sign      Days of Exercise per Week: 2 days      Minutes of Exercise per Session: 20 min   Stress: No Stress Concern Present (2024)     [employer] [place] [employer] -  "Occupational Stress Questionnaire      Feeling of Stress : Only a little   Housing Stability: Unknown (8/6/2024)     Housing Stability Vital Sign      Unable to Pay for Housing in the Last Year: No         Last pap 6-5-2023(normal)  Last pathology 2-:Endometrial polyp with background inactive/atrophic endometrium   Negative for hyperplasia or malignancy   Last ultrasound 12-:EXAMINATION:  US PELVIS COMPLETE NON OB     CLINICAL HISTORY:  Postmenopausal bleeding     TECHNIQUE:  Transabdominal sonography of the pelvis was performed.     COMPARISON:  Ultrasound 11/27/2020     FINDINGS:  Uterus:     Size: 9.0 x 3.2 x 5.3 cm     Masses: None     Endometrium: Abnormally thickened in this post menopausal patient, measuring 8 mm.     Right ovary:     Size: 3.4 x 2.8 x 3.0 cm     Appearance: Anechoic structure measuring 1.8 x 1.5 x 1.4 cm, likely representing a small cyst.     Vascular flow: Normal.     Left ovary:     Size: 2.2 x 1.5 x 1.6 cm     Appearance: Normal     Vascular Flow: Normal.     Free Fluid:     None.     Impression:     Abnormally thickened endometrium in this postmenopausal patient, noting limitation from only transabdominal imaging performed.  No masses or abnormal fluid.  Recommend clinical correlation and attention on follow-up.        BP (!) 153/83 (Patient Position: Sitting)   Ht 5' 2" (1.575 m)   Wt 97.9 kg (215 lb 12.8 oz)   LMP 11/24/2010 (Approximate)   BMI 39.47 kg/m²      ROS:    GENERAL: Denies weight gain or weight loss. Feeling well overall.   SKIN: Denies rash or lesions.   HEAD: Denies head injury or headache.   NODES: Denies enlarged lymph nodes.   CHEST: Denies chest pain or shortness of breath.   CARDIOVASCULAR: Denies palpitations or left sided chest pain.   ABDOMEN: No abdominal pain, constipation, diarrhea, nausea, vomiting or rectal bleeding.   URINARY: No frequency, dysuria, hematuria, or burning on urination.  REPRODUCTIVE: See HPI.   BREASTS: The patient performs " breast self-examination and denies pain, lumps, or nipple discharge.   HEMATOLOGIC: No easy bruisability or excessive bleeding with the exception of menstrual cycles.  MUSCULOSKELETAL: Denies joint pain or swelling.   NEUROLOGIC: Denies syncope or weakness.   PSYCHIATRIC: Denies depression, anxiety or mood swings.     PHYSICAL EXAM:     APPEARANCE: Well nourished, well developed, in no acute distress.  AFFECT: WNL, alert and oriented x 3  SKIN: No acne or hirsutism  NECK: Neck symmetric without masses or thyromegaly  NODES: No inguinal, cervical, axillary, or femoral lymph node enlargement  CHEST: Good respiratory effect  ABDOMEN: Soft.  No tenderness or masses.  No hepatosplenomegaly.  No hernias.  PELVIC: Normal external genitalia without lesions.  Normal hair distribution.  Adequate perineal body, normal urethral meatus.  Vagina Moderately rugated without lesions or discharge.  Cervix pink, without lesions, discharge or tenderness.  No significant cystocele or rectocele.  Bimanual exam shows uterus to be normal size, regular, mobile and nontender.  Adnexa without masses or tenderness.    EXTREMITIES: No edema.     ASSESSMENT & PLAN:     Rogelio was seen today for pre op, pre-op exam and postmenopausal bleeding.     Diagnoses and all orders for this visit:     Preoperative exam for gynecologic surgery     PMB (postmenopausal bleeding)           I have discussed the risks, benefits, indications, and alternatives of the procedure in detail.  The patient verbalizes her understanding.  All questions answered.  Consents signed.  The patient agrees to proceed to proceed as planned.

## 2025-01-14 NOTE — DISCHARGE INSTRUCTIONS
Information to Prepare you for your Surgery    PRE-ADMIT TESTING -  [phone]    [address]  [facility]          Your surgery has been scheduled at [facility]. We are pleased to have the opportunity to serve you. For Further Information please call [phone].    On the day of surgery please report to the Information Desk on the 1st floor.    CONTACT YOUR PHYSICIAN'S OFFICE THE DAY PRIOR TO YOUR SURGERY TO OBTAIN YOUR ARRIVAL TIME.     The evening before surgery do not eat anything after 9 p.m. ( this includes hard candy, chewing gum and mints).  You may only have GATORADE, POWERADE AND WATER  from 9 p.m. until you leave your home.   DO NOT DRINK ANY LIQUIDS ON THE WAY TO THE HOSPITAL.      Why does your anesthesiologist allow you to drink Gatorade/Powerade before surgery?  Gatorade/Powerade helps to increase your comfort before surgery and to decrease your nausea after surgery. The carbohydrates in Gatorade/Powerade help reduce your body's stress response to surgery.  If you are a diabetic-drink only water prior to surgery.    Outpatient Surgery- May allow 2 adult (18 and older) Support Persons (1 being the designated ) for all surgical/procedural patients. A breastfeeding mother will be allowed her infant and 2 adult Support Persons. No one under the age of 18 will be allowed in the building. No swapping out of visitors in the [facility].      SPECIAL MEDICATION INSTRUCTIONS: TAKE medications checked off by the Anesthesiologist on your Medication List.    Angiogram Patients: Take medications as instructed by your physician, including aspirin.     Surgery Patients:    If you take ASPIRIN - Your PHYSICIAN/SURGEON will need to inform you IF/OR when you need to stop taking aspirin prior to your surgery.     The week prior to surgery do not ot take any medications containing IBUPROFEN or NSAIDS ( Advil, Motrin, Goodys, BC, Aleve, Naproxen etc)  If you are not sure if you should take a medicine please call your surgeon's office.  Ok to take Tylenol    Do Not Wear any make-up (especially eye make-up) to surgery. Please remove any false eyelashes or eyelash extensions. If you arrive the day of surgery with makeup/eyelashes on you will be required to remove prior to surgery. (There is a risk of corneal abrasions if eye makeup/eyelash extensions are not removed)      Leave all valuables at home.   Do Not wear any jewelry or watches, including any metal in body piercings. Jewelry must be removed prior to coming to the hospital.  There is a possibility that rings that are unable to be removed may be cut off if they are on the surgical extremity.    Please remove all hair extensions, wigs, clips and any other metal accessories/ ornaments from your hair.  These items may pose a flammable/fire risk in Surgery and must be removed.    Do not shave your surgical area at least 5 days prior to your surgery. The surgical prep will be performed at the hospital according to Infection Control regulations.    Contact Lens must be removed before surgery. Either do not wear the contact lens or bring a case and solution for storage.  Please bring a container for eyeglasses or dentures as required.  Bring any paperwork your physician has provided, such as consent forms,  history and physicals, doctor's orders, etc.   Bring comfortable clothes that are loose fitting to wear upon discharge. Take into consideration the type of surgery being performed.  Maintain your diet as advised per your physician the day prior to surgery.      Adequate rest the night before surgery is advised.   Park in the Parking lot behind the hospital or in the [place] [facility] across the street from the parking lot. Parking is complimentary.  If you will be discharged the same day as your procedure, please arrange for a responsible adult to drive you home or to accompany you if traveling by taxi.    YOU WILL NOT BE PERMITTED TO DRIVE OR TO LEAVE THE HOSPITAL ALONE AFTER SURGERY.   If you are being discharged the same day, it is strongly recommended that you arrange for someone to remain with you for the first 24 hrs following your surgery.    The Surgeon will speak to your family/visitor after your surgery regarding the outcome of your surgery and post op care.  The Surgeon may speak to you after your surgery, but there is a possibility you may not remember the details.  Please check with your family members regarding the conversation with the Surgeon.    We strongly recommend whoever is bringing you home be present for discharge instructions.  This will ensure a thorough understanding for your post op home care.    If the patient has fever, cough, or signs/symptoms of Flu or Covid please do not come in for your surgery. Contact your surgeon and your primary care physician for further instructions.           Thank you for your cooperation.  The Staff of [facility].            Bathing Instructions with Hibiclens    Shower the evening before and morning of your procedure with Chlorhexidine (Hibiclens)  do not use Chlorhexidine on your face or genitals. Do not get in your eyes.  Wash your face with water and your regular face wash/soap  Use your regular shampoo  Apply Chlorhexidine (Hibiclens) directly on your skin or on a wet washcloth and wash gently. When showering: Move away from the shower stream when applying Chlorhexidine (Hibiclens) to avoid rinsing off too soon.  Rinse thoroughly with warm water  Do not dilute Chlorhexidine (Hibiclens)   Dry off as usual, do not use any deodorant, powder, body lotions, perfume, after shave or cologne.

## 2025-01-14 NOTE — ANESTHESIA PREPROCEDURE EVALUATION
01/14/2025  Rogelio Davila is a 64 y.o., female.      Pre-op Assessment    I have reviewed the Patient Summary Reports.     I have reviewed the Nursing Notes. I have reviewed the NPO Status.   I have reviewed the Medications.     Review of Systems  Anesthesia Hx:  No problems with previous Anesthesia             Denies Family Hx of Anesthesia complications.    Denies Personal Hx of Anesthesia complications.                    Social:  Non-Smoker       Hematology/Oncology:  Hematology Normal   Oncology Normal                                   EENT/Dental:  EENT/Dental Normal           Cardiovascular:     Hypertension                                          Pulmonary:        Sleep Apnea                Renal/:  Renal/ Normal                 Hepatic/GI:  Hepatic/GI Normal                    Musculoskeletal:  Musculoskeletal Normal                Neurological:  Neurology Normal                                      Endocrine:  Endocrine Normal            Dermatological:  Skin Normal    Psych:  Psychiatric Normal                  Physical Exam  General: Well nourished and Alert    Airway:  Mallampati: II   Mouth Opening: Normal  Tongue: Normal    Dental:  Intact      Anesthesia Plan  Type of Anesthesia, risks & benefits discussed:    Anesthesia Type: Gen Supraglottic Airway  Intra-op Monitoring Plan: Standard ASA Monitors  Post Op Pain Control Plan: multimodal analgesia  Induction:  IV  Airway Plan: Video  Informed Consent: Informed consent signed with the Patient and all parties understand the risks and agree with anesthesia plan.  All questions answered.   ASA Score: 2  Day of Surgery Review of History & Physical: H&P Update referred to the surgeon/provider.  Anesthesia Plan Notes: Labs per surgeon    Ready For Surgery From Anesthesia Perspective.     .

## 2025-01-14 NOTE — H&P (VIEW-ONLY)
C.C Pre-op Exam        HPI : Rogelio Davila is a 64 y.o. female  female who presents for preop exam for a Hysteroscopy D&C scheduled on 2025.  She is menopausal since age 50 and is on HRT, reports had several days of bleeding in November when she ran out of her Estradiol patches, then resumed them. Hx of endometrial polyp removed with in office Hysteroscopy in . (Found the procedure very painful, even with local) .           Past Medical History:   Diagnosis Date    Abnormal Pap smear of cervix      Hyperlipidemia      Hypertension              Past Surgical History:   Procedure Laterality Date     SECTION         x2    COLPOSCOPY                 Family History   Problem Relation Name Age of Onset    Breast cancer Paternal Aunt   58    Cancer Maternal Grandfather             lung    Diabetes Maternal Grandfather        Stroke Father   62    Hypertension Father        Heart attacks under age 50 Father             smoker, drinker    Hypertension Mother        Melanoma Neg Hx        Ovarian cancer Neg Hx        Colon cancer Neg Hx          OB History            2    Para   2    Term   2            AB        Living   2           SAB        IAB        Ectopic        Multiple        Live Births   2                  Review of patient's allergies indicates:  No Known Allergies    Current Medications      Current Outpatient Medications:     biotin 1 mg tablet, Take 1,000 mcg by mouth., Disp: , Rfl:     calcium carbonate (OS-HECTOR) 600 mg calcium (1,500 mg) Tab, Take 600 mg by mouth once., Disp: , Rfl:     cetirizine (ZYRTEC) 10 MG tablet, Take 10 mg by mouth., Disp: , Rfl:     estradioL (ESTRACE) 0.01 % (0.1 mg/gram) vaginal cream, Place 1 g vaginally 3 (three) times a week., Disp: 42 g, Rfl: 3    estradiol 0.05 mg/24 hr td ptsw (VIVELLE-DOT) 0.05 mg/24 hr, Place 1 patch onto the skin twice a week., Disp: 24 patch, Rfl: 3    hydrochlorothiazide (HYDRODIURIL) 25 MG tablet, Take 25 mg by  mouth once daily. , Disp: , Rfl:     meloxicam (MOBIC) 15 MG tablet, Take 15 mg by mouth daily as needed., Disp: , Rfl:     metoprolol tartrate (LOPRESSOR) 25 MG tablet, Take 1 tablet by mouth 2 (two) times daily., Disp: , Rfl:     MULTIVITAMIN ORAL, Take by mouth., Disp: , Rfl:     naproxen (EC NAPROSYN) 500 MG EC tablet, TAKE 1 TABLET(500 MG) BY MOUTH TWICE DAILY WITH MEALS, Disp: 60 tablet, Rfl: 0    progesterone (PROMETRIUM) 200 MG capsule, Take 1 capsule (200 mg total) by mouth every evening., Disp: 90 capsule, Rfl: 3    UNABLE TO FIND, Inject as directed Every 15 days. Kevzara injection, Disp: , Rfl:      Social History            Socioeconomic History    Marital status:    Tobacco Use    Smoking status: Former       Current packs/day: 0.00       Types: Cigarettes       Quit date: 2013       Years since quittin.5    Smokeless tobacco: Former   Substance and Sexual Activity    Alcohol use: Yes       Comment: occasionally    Drug use: No    Sexual activity: Not Currently       Comment:    Other Topics Concern    Are you pregnant or think you may be? No      Social Drivers of Health           Financial Resource Strain: Medium Risk (2024)     Overall Financial Resource Strain (CARDIA)      Difficulty of Paying Living Expenses: Somewhat hard   Food Insecurity: No Food Insecurity (2024)     Hunger Vital Sign      Worried About Running Out of Food in the Last Year: Never true      Ran Out of Food in the Last Year: Never true   Transportation Needs: No Transportation Needs (2022)     Received from [facility]      Lack of Transportation (Medical): No      Lack of Transportation (Non-Medical): No   Physical Activity: Insufficiently Active (2024)     Exercise Vital Sign      Days of Exercise per Week: 2 days      Minutes of Exercise per Session: 20 min   Stress: No Stress Concern Present (2024)     [employer] [place] [employer] -  "Occupational Stress Questionnaire      Feeling of Stress : Only a little   Housing Stability: Unknown (8/6/2024)     Housing Stability Vital Sign      Unable to Pay for Housing in the Last Year: No         Last pap 6-5-2023(normal)  Last pathology 2-:Endometrial polyp with background inactive/atrophic endometrium   Negative for hyperplasia or malignancy   Last ultrasound 12-:EXAMINATION:  US PELVIS COMPLETE NON OB     CLINICAL HISTORY:  Postmenopausal bleeding     TECHNIQUE:  Transabdominal sonography of the pelvis was performed.     COMPARISON:  Ultrasound 11/27/2020     FINDINGS:  Uterus:     Size: 9.0 x 3.2 x 5.3 cm     Masses: None     Endometrium: Abnormally thickened in this post menopausal patient, measuring 8 mm.     Right ovary:     Size: 3.4 x 2.8 x 3.0 cm     Appearance: Anechoic structure measuring 1.8 x 1.5 x 1.4 cm, likely representing a small cyst.     Vascular flow: Normal.     Left ovary:     Size: 2.2 x 1.5 x 1.6 cm     Appearance: Normal     Vascular Flow: Normal.     Free Fluid:     None.     Impression:     Abnormally thickened endometrium in this postmenopausal patient, noting limitation from only transabdominal imaging performed.  No masses or abnormal fluid.  Recommend clinical correlation and attention on follow-up.        BP (!) 153/83 (Patient Position: Sitting)   Ht 5' 2" (1.575 m)   Wt 97.9 kg (215 lb 12.8 oz)   LMP 11/24/2010 (Approximate)   BMI 39.47 kg/m²      ROS:    GENERAL: Denies weight gain or weight loss. Feeling well overall.   SKIN: Denies rash or lesions.   HEAD: Denies head injury or headache.   NODES: Denies enlarged lymph nodes.   CHEST: Denies chest pain or shortness of breath.   CARDIOVASCULAR: Denies palpitations or left sided chest pain.   ABDOMEN: No abdominal pain, constipation, diarrhea, nausea, vomiting or rectal bleeding.   URINARY: No frequency, dysuria, hematuria, or burning on urination.  REPRODUCTIVE: See HPI.   BREASTS: The patient performs " breast self-examination and denies pain, lumps, or nipple discharge.   HEMATOLOGIC: No easy bruisability or excessive bleeding with the exception of menstrual cycles.  MUSCULOSKELETAL: Denies joint pain or swelling.   NEUROLOGIC: Denies syncope or weakness.   PSYCHIATRIC: Denies depression, anxiety or mood swings.     PHYSICAL EXAM:     APPEARANCE: Well nourished, well developed, in no acute distress.  AFFECT: WNL, alert and oriented x 3  SKIN: No acne or hirsutism  NECK: Neck symmetric without masses or thyromegaly  NODES: No inguinal, cervical, axillary, or femoral lymph node enlargement  CHEST: Good respiratory effect  ABDOMEN: Soft.  No tenderness or masses.  No hepatosplenomegaly.  No hernias.  PELVIC: Normal external genitalia without lesions.  Normal hair distribution.  Adequate perineal body, normal urethral meatus.  Vagina Moderately rugated without lesions or discharge.  Cervix pink, without lesions, discharge or tenderness.  No significant cystocele or rectocele.  Bimanual exam shows uterus to be normal size, regular, mobile and nontender.  Adnexa without masses or tenderness.    EXTREMITIES: No edema.     ASSESSMENT & PLAN:     Rogelio was seen today for pre op, pre-op exam and postmenopausal bleeding.     Diagnoses and all orders for this visit:     Preoperative exam for gynecologic surgery     PMB (postmenopausal bleeding)           I have discussed the risks, benefits, indications, and alternatives of the procedure in detail.  The patient verbalizes her understanding.  All questions answered.  Consents signed.  The patient agrees to proceed to proceed as planned.

## 2025-01-14 NOTE — PROGRESS NOTES
C.C Pre-op Exam      HPI : Rogelio Davila is a 64 y.o. female  female who presents for preop exam for a Hysteroscopy D&C scheduled on 2025.  She is menopausal since age 50 and is on HRT, reports had several days of bleeding in November when she ran out of her Estradiol patches, then resumed them. Hx of endometrial polyp removed with in office Hysteroscopy in . (Found the procedure very painful, even with local) .     Past Medical History:   Diagnosis Date    Abnormal Pap smear of cervix     Hyperlipidemia     Hypertension      Past Surgical History:   Procedure Laterality Date     SECTION      x2    COLPOSCOPY       Family History   Problem Relation Name Age of Onset    Breast cancer Paternal Aunt  58    Cancer Maternal Grandfather          lung    Diabetes Maternal Grandfather      Stroke Father  62    Hypertension Father      Heart attacks under age 50 Father          smoker, drinker    Hypertension Mother      Melanoma Neg Hx      Ovarian cancer Neg Hx      Colon cancer Neg Hx       OB History          2    Para   2    Term   2            AB        Living   2         SAB        IAB        Ectopic        Multiple        Live Births   2               Review of patient's allergies indicates:  No Known Allergies    Current Outpatient Medications:     biotin 1 mg tablet, Take 1,000 mcg by mouth., Disp: , Rfl:     calcium carbonate (OS-HECTOR) 600 mg calcium (1,500 mg) Tab, Take 600 mg by mouth once., Disp: , Rfl:     cetirizine (ZYRTEC) 10 MG tablet, Take 10 mg by mouth., Disp: , Rfl:     estradioL (ESTRACE) 0.01 % (0.1 mg/gram) vaginal cream, Place 1 g vaginally 3 (three) times a week., Disp: 42 g, Rfl: 3    estradiol 0.05 mg/24 hr td ptsw (VIVELLE-DOT) 0.05 mg/24 hr, Place 1 patch onto the skin twice a week., Disp: 24 patch, Rfl: 3    hydrochlorothiazide (HYDRODIURIL) 25 MG tablet, Take 25 mg by mouth once daily. , Disp: , Rfl:     meloxicam (MOBIC) 15 MG tablet, Take 15 mg  by mouth daily as needed., Disp: , Rfl:     metoprolol tartrate (LOPRESSOR) 25 MG tablet, Take 1 tablet by mouth 2 (two) times daily., Disp: , Rfl:     MULTIVITAMIN ORAL, Take by mouth., Disp: , Rfl:     naproxen (EC NAPROSYN) 500 MG EC tablet, TAKE 1 TABLET(500 MG) BY MOUTH TWICE DAILY WITH MEALS, Disp: 60 tablet, Rfl: 0    progesterone (PROMETRIUM) 200 MG capsule, Take 1 capsule (200 mg total) by mouth every evening., Disp: 90 capsule, Rfl: 3    UNABLE TO FIND, Inject as directed Every 15 days. Kevzara injection, Disp: , Rfl:   Social History     Socioeconomic History    Marital status:    Tobacco Use    Smoking status: Former     Current packs/day: 0.00     Types: Cigarettes     Quit date: 2013     Years since quittin.5    Smokeless tobacco: Former   Substance and Sexual Activity    Alcohol use: Yes     Comment: occasionally    Drug use: No    Sexual activity: Not Currently     Comment:    Other Topics Concern    Are you pregnant or think you may be? No     Social Drivers of Health     Financial Resource Strain: Medium Risk (2024)    Overall Financial Resource Strain (CARDIA)     Difficulty of Paying Living Expenses: Somewhat hard   Food Insecurity: No Food Insecurity (2024)    Hunger Vital Sign     Worried About Running Out of Food in the Last Year: Never true     Ran Out of Food in the Last Year: Never true   Transportation Needs: No Transportation Needs (2022)    [facility]     Lack of Transportation (Medical): No     Lack of Transportation (Non-Medical): No   Physical Activity: Insufficiently Active (2024)    Exercise Vital Sign     Days of Exercise per Week: 2 days     Minutes of Exercise per Session: 20 min   Stress: No Stress Concern Present (2024)    [employer] [place] [employer] - Occupational Stress Questionnaire     Feeling of Stress : Only a little   Housing Stability: Unknown (2024)    Housing  "Stability Vital Sign     Unable to Pay for Housing in the Last Year: No       Last pap 6-5-2023(normal)  Last pathology 2-:Endometrial polyp with background inactive/atrophic endometrium   Negative for hyperplasia or malignancy   Last ultrasound 12-:EXAMINATION:  US PELVIS COMPLETE NON OB     CLINICAL HISTORY:  Postmenopausal bleeding     TECHNIQUE:  Transabdominal sonography of the pelvis was performed.     COMPARISON:  Ultrasound 11/27/2020     FINDINGS:  Uterus:     Size: 9.0 x 3.2 x 5.3 cm     Masses: None     Endometrium: Abnormally thickened in this post menopausal patient, measuring 8 mm.     Right ovary:     Size: 3.4 x 2.8 x 3.0 cm     Appearance: Anechoic structure measuring 1.8 x 1.5 x 1.4 cm, likely representing a small cyst.     Vascular flow: Normal.     Left ovary:     Size: 2.2 x 1.5 x 1.6 cm     Appearance: Normal     Vascular Flow: Normal.     Free Fluid:     None.     Impression:     Abnormally thickened endometrium in this postmenopausal patient, noting limitation from only transabdominal imaging performed.  No masses or abnormal fluid.  Recommend clinical correlation and attention on follow-up.       BP (!) 153/83 (Patient Position: Sitting)   Ht 5' 2" (1.575 m)   Wt 97.9 kg (215 lb 12.8 oz)   LMP 11/24/2010 (Approximate)   BMI 39.47 kg/m²     ROS:    GENERAL: Denies weight gain or weight loss. Feeling well overall.   SKIN: Denies rash or lesions.   HEAD: Denies head injury or headache.   NODES: Denies enlarged lymph nodes.   CHEST: Denies chest pain or shortness of breath.   CARDIOVASCULAR: Denies palpitations or left sided chest pain.   ABDOMEN: No abdominal pain, constipation, diarrhea, nausea, vomiting or rectal bleeding.   URINARY: No frequency, dysuria, hematuria, or burning on urination.  REPRODUCTIVE: See HPI.   BREASTS: The patient performs breast self-examination and denies pain, lumps, or nipple discharge.   HEMATOLOGIC: No easy bruisability or excessive bleeding " with the exception of menstrual cycles.  MUSCULOSKELETAL: Denies joint pain or swelling.   NEUROLOGIC: Denies syncope or weakness.   PSYCHIATRIC: Denies depression, anxiety or mood swings.    PHYSICAL EXAM:    APPEARANCE: Well nourished, well developed, in no acute distress.  AFFECT: WNL, alert and oriented x 3  SKIN: No acne or hirsutism  NECK: Neck symmetric without masses or thyromegaly  NODES: No inguinal, cervical, axillary, or femoral lymph node enlargement  CHEST: Good respiratory effect  ABDOMEN: Soft.  No tenderness or masses.  No hepatosplenomegaly.  No hernias.  PELVIC: Normal external genitalia without lesions.  Normal hair distribution.  Adequate perineal body, normal urethral meatus.  Vagina MOderately  rugated without lesions or discharge.  Cervix pink, without lesions, discharge or tenderness.  No significant cystocele or rectocele.  Bimanual exam shows uterus to be normal size, regular, mobile and nontender.  Adnexa without masses or tenderness.    EXTREMITIES: No edema.    ASSESSMENT & PLAN:    Rogelio was seen today for pre op, pre-op exam and postmenopausal bleeding.    Diagnoses and all orders for this visit:    Preoperative exam for gynecologic surgery    PMB (postmenopausal bleeding)         I have discussed the risks, benefits, indications, and alternatives of the procedure in detail.  The patient verbalizes her understanding.  All questions answered.  Consents signed.  The patient agrees to proceed to proceed as planned.

## 2025-01-15 NOTE — MEDICAL/APP STUDENT
Date  @ 1200 with Narcisa/Mayur  Hysteroscopy w D&C for PMB  [ ] check in   [ ] Needs consents  [ ] Op note  [ ] Discharge orders, summary     64 y.o.    Path: negative pap (2023)  TVUS: uterus 9.0 x 3.2 x 5.3 cm ; R ovary 3.4 x 2.8 x 3.0 cm anechoc structure measuring 1.8 x 1.5 x 1.4 cm most likely small cyst, L ovary 2.2 x 1.5 x 1.6 cm   HH: 14.4/43.2  BMI: 39.47  PMH: abnormal pap smear of cervix, hyperlipidemia, hypertension, PMR, SVT (only one episode )  PSH:  x2, colposcopy     Admission Dx: ***  POD#***   EBL: * No values recorded between  and 1/15/2025  2:42 PM *      Pre H/H: ***   Lab Results   Component Value Date    WBC 5.40 2025    HGB 14.4 2025    HCT 43.2 2025    MCV 92 2025     2025      PMH:   Past Medical History:   Diagnosis Date    Abnormal Pap smear of cervix     Hyperlipidemia     Hypertension     PMR (polymyalgia rheumatica)     newly diagnosis    SVT (supraventricular tachycardia)     only one episode-      Meds: ***ibuprofen, norco PRN, dilaudid BTP, zof/phen/sim/tunde, ***   Home meds: ***  No medications prior to admission.      Diet: ***reg  F/U:  [ ] CBC*** in AM   [ ] porter*** out in AM

## 2025-01-16 ENCOUNTER — HOSPITAL ENCOUNTER (OUTPATIENT)
Facility: OTHER | Age: 65
Discharge: HOME | End: 2025-01-16
Attending: OBSTETRICS & GYNECOLOGY | Admitting: OBSTETRICS & GYNECOLOGY
Payer: COMMERCIAL

## 2025-01-16 ENCOUNTER — ANESTHESIA (OUTPATIENT)
Dept: SURGERY | Facility: OTHER | Age: 65
End: 2025-01-16
Payer: COMMERCIAL

## 2025-01-16 DIAGNOSIS — Z01.818 PREOPERATIVE EXAM FOR GYNECOLOGIC SURGERY: ICD-10-CM

## 2025-01-16 DIAGNOSIS — N95.0 PMB (POSTMENOPAUSAL BLEEDING): ICD-10-CM

## 2025-01-16 DIAGNOSIS — Z98.890 STATUS POST HYSTEROSCOPY: Primary | ICD-10-CM

## 2025-01-16 PROBLEM — R93.89 THICKENED ENDOMETRIUM: Status: ACTIVE | Noted: 2025-01-16

## 2025-01-16 PROCEDURE — 36000707: Performed by: OBSTETRICS & GYNECOLOGY

## 2025-01-16 PROCEDURE — 36000706: Performed by: OBSTETRICS & GYNECOLOGY

## 2025-01-16 PROCEDURE — 63600175 PHARM REV CODE 636 W HCPCS: Performed by: STUDENT IN AN ORGANIZED HEALTH CARE EDUCATION/TRAINING PROGRAM

## 2025-01-16 PROCEDURE — 71000016 HC POSTOP RECOV ADDL HR: Performed by: OBSTETRICS & GYNECOLOGY

## 2025-01-16 PROCEDURE — 37000009 HC ANESTHESIA EA ADD 15 MINS: Performed by: OBSTETRICS & GYNECOLOGY

## 2025-01-16 PROCEDURE — 25000003 PHARM REV CODE 250: Performed by: ANESTHESIOLOGY

## 2025-01-16 PROCEDURE — D9220A PRA ANESTHESIA: Mod: ANES,,, | Performed by: ANESTHESIOLOGY

## 2025-01-16 PROCEDURE — 71000033 HC RECOVERY, INTIAL HOUR: Performed by: OBSTETRICS & GYNECOLOGY

## 2025-01-16 PROCEDURE — 88305 TISSUE EXAM BY PATHOLOGIST: CPT | Mod: 26,,, | Performed by: PATHOLOGY

## 2025-01-16 PROCEDURE — D9220A PRA ANESTHESIA: Mod: CRNA,,, | Performed by: STUDENT IN AN ORGANIZED HEALTH CARE EDUCATION/TRAINING PROGRAM

## 2025-01-16 PROCEDURE — 27201423 OPTIME MED/SURG SUP & DEVICES STERILE SUPPLY: Performed by: OBSTETRICS & GYNECOLOGY

## 2025-01-16 PROCEDURE — 88305 TISSUE EXAM BY PATHOLOGIST: CPT | Performed by: PATHOLOGY

## 2025-01-16 PROCEDURE — 58558 HYSTEROSCOPY BIOPSY: CPT | Mod: ,,, | Performed by: OBSTETRICS & GYNECOLOGY

## 2025-01-16 PROCEDURE — 37000008 HC ANESTHESIA 1ST 15 MINUTES: Performed by: OBSTETRICS & GYNECOLOGY

## 2025-01-16 PROCEDURE — 71000015 HC POSTOP RECOV 1ST HR: Performed by: OBSTETRICS & GYNECOLOGY

## 2025-01-16 PROCEDURE — C1782 MORCELLATOR: HCPCS | Performed by: OBSTETRICS & GYNECOLOGY

## 2025-01-16 PROCEDURE — 25000003 PHARM REV CODE 250: Performed by: OBSTETRICS & GYNECOLOGY

## 2025-01-16 RX ORDER — ONDANSETRON HYDROCHLORIDE 2 MG/ML
INJECTION, SOLUTION INTRAVENOUS
Status: DISCONTINUED | OUTPATIENT
Start: 2025-01-16 | End: 2025-01-16

## 2025-01-16 RX ORDER — IBUPROFEN 600 MG/1
600 TABLET ORAL EVERY 6 HOURS PRN
Qty: 60 TABLET | Refills: 0 | Status: SHIPPED | OUTPATIENT
Start: 2025-01-16

## 2025-01-16 RX ORDER — DIPHENHYDRAMINE HCL 25 MG
25 CAPSULE ORAL EVERY 4 HOURS PRN
Status: DISCONTINUED | OUTPATIENT
Start: 2025-01-16 | End: 2025-01-16 | Stop reason: HOSPADM

## 2025-01-16 RX ORDER — FENTANYL CITRATE 50 UG/ML
INJECTION, SOLUTION INTRAMUSCULAR; INTRAVENOUS
Status: DISCONTINUED | OUTPATIENT
Start: 2025-01-16 | End: 2025-01-16

## 2025-01-16 RX ORDER — HYDROMORPHONE HYDROCHLORIDE 2 MG/ML
0.4 INJECTION, SOLUTION INTRAMUSCULAR; INTRAVENOUS; SUBCUTANEOUS EVERY 5 MIN PRN
Status: DISCONTINUED | OUTPATIENT
Start: 2025-01-16 | End: 2025-01-16 | Stop reason: HOSPADM

## 2025-01-16 RX ORDER — HYDROCODONE BITARTRATE AND ACETAMINOPHEN 5; 325 MG/1; MG/1
1 TABLET ORAL EVERY 4 HOURS PRN
Status: DISCONTINUED | OUTPATIENT
Start: 2025-01-16 | End: 2025-01-16 | Stop reason: HOSPADM

## 2025-01-16 RX ORDER — IBUPROFEN 800 MG/1
800 TABLET ORAL EVERY 8 HOURS PRN
Qty: 12 TABLET | Refills: 0 | Status: SHIPPED | OUTPATIENT
Start: 2025-01-16 | End: 2025-01-16 | Stop reason: HOSPADM

## 2025-01-16 RX ORDER — ACETAMINOPHEN 500 MG
1000 TABLET ORAL
Status: COMPLETED | OUTPATIENT
Start: 2025-01-16 | End: 2025-01-16

## 2025-01-16 RX ORDER — OXYCODONE HYDROCHLORIDE 5 MG/1
5 TABLET ORAL EVERY 4 HOURS PRN
Status: DISCONTINUED | OUTPATIENT
Start: 2025-01-16 | End: 2025-01-16 | Stop reason: HOSPADM

## 2025-01-16 RX ORDER — MUPIROCIN 20 MG/G
OINTMENT TOPICAL
Status: DISCONTINUED | OUTPATIENT
Start: 2025-01-16 | End: 2025-01-16 | Stop reason: HOSPADM

## 2025-01-16 RX ORDER — FAMOTIDINE 20 MG/1
20 TABLET, FILM COATED ORAL
Status: COMPLETED | OUTPATIENT
Start: 2025-01-16 | End: 2025-01-16

## 2025-01-16 RX ORDER — DIPHENHYDRAMINE HYDROCHLORIDE 50 MG/ML
25 INJECTION INTRAMUSCULAR; INTRAVENOUS EVERY 4 HOURS PRN
Status: DISCONTINUED | OUTPATIENT
Start: 2025-01-16 | End: 2025-01-16 | Stop reason: HOSPADM

## 2025-01-16 RX ORDER — DEXTROMETHORPHAN HYDROBROMIDE, GUAIFENESIN 5; 100 MG/5ML; MG/5ML
650 LIQUID ORAL EVERY 6 HOURS
Qty: 60 TABLET | Refills: 0 | Status: SHIPPED | OUTPATIENT
Start: 2025-01-16

## 2025-01-16 RX ORDER — LIDOCAINE HYDROCHLORIDE 10 MG/ML
0.5 INJECTION, SOLUTION EPIDURAL; INFILTRATION; INTRACAUDAL; PERINEURAL ONCE
Status: DISCONTINUED | OUTPATIENT
Start: 2025-01-16 | End: 2025-01-16 | Stop reason: HOSPADM

## 2025-01-16 RX ORDER — GLUCAGON 1 MG
1 KIT INJECTION
Status: DISCONTINUED | OUTPATIENT
Start: 2025-01-16 | End: 2025-01-16 | Stop reason: HOSPADM

## 2025-01-16 RX ORDER — DEXAMETHASONE SODIUM PHOSPHATE 4 MG/ML
INJECTION, SOLUTION INTRA-ARTICULAR; INTRALESIONAL; INTRAMUSCULAR; INTRAVENOUS; SOFT TISSUE
Status: DISCONTINUED | OUTPATIENT
Start: 2025-01-16 | End: 2025-01-16

## 2025-01-16 RX ORDER — SODIUM CHLORIDE 0.9 % (FLUSH) 0.9 %
3 SYRINGE (ML) INJECTION
Status: DISCONTINUED | OUTPATIENT
Start: 2025-01-16 | End: 2025-01-16 | Stop reason: HOSPADM

## 2025-01-16 RX ORDER — LIDOCAINE HYDROCHLORIDE 20 MG/ML
INJECTION INTRAVENOUS
Status: DISCONTINUED | OUTPATIENT
Start: 2025-01-16 | End: 2025-01-16

## 2025-01-16 RX ORDER — PROCHLORPERAZINE EDISYLATE 5 MG/ML
5 INJECTION INTRAMUSCULAR; INTRAVENOUS EVERY 30 MIN PRN
Status: DISCONTINUED | OUTPATIENT
Start: 2025-01-16 | End: 2025-01-16 | Stop reason: HOSPADM

## 2025-01-16 RX ORDER — SODIUM CHLORIDE 9 MG/ML
INJECTION, SOLUTION INTRAVENOUS CONTINUOUS
Status: DISCONTINUED | OUTPATIENT
Start: 2025-01-16 | End: 2025-01-16 | Stop reason: HOSPADM

## 2025-01-16 RX ORDER — SODIUM CHLORIDE, SODIUM LACTATE, POTASSIUM CHLORIDE, CALCIUM CHLORIDE 600; 310; 30; 20 MG/100ML; MG/100ML; MG/100ML; MG/100ML
INJECTION, SOLUTION INTRAVENOUS CONTINUOUS
Status: DISCONTINUED | OUTPATIENT
Start: 2025-01-16 | End: 2025-01-16 | Stop reason: HOSPADM

## 2025-01-16 RX ORDER — PROPOFOL 10 MG/ML
VIAL (ML) INTRAVENOUS
Status: DISCONTINUED | OUTPATIENT
Start: 2025-01-16 | End: 2025-01-16

## 2025-01-16 RX ORDER — MEPERIDINE HYDROCHLORIDE 25 MG/ML
12.5 INJECTION INTRAMUSCULAR; INTRAVENOUS; SUBCUTANEOUS ONCE AS NEEDED
Status: DISCONTINUED | OUTPATIENT
Start: 2025-01-16 | End: 2025-01-16 | Stop reason: HOSPADM

## 2025-01-16 RX ORDER — DIPHENHYDRAMINE HYDROCHLORIDE 50 MG/ML
12.5 INJECTION INTRAMUSCULAR; INTRAVENOUS EVERY 30 MIN PRN
Status: DISCONTINUED | OUTPATIENT
Start: 2025-01-16 | End: 2025-01-16 | Stop reason: HOSPADM

## 2025-01-16 RX ORDER — DOCUSATE SODIUM 100 MG/1
100 CAPSULE, LIQUID FILLED ORAL 2 TIMES DAILY
Qty: 120 CAPSULE | Refills: 0 | Status: SHIPPED | OUTPATIENT
Start: 2025-01-16

## 2025-01-16 RX ADMIN — ONDANSETRON HYDROCHLORIDE 4 MG: 2 INJECTION INTRAMUSCULAR; INTRAVENOUS at 12:01

## 2025-01-16 RX ADMIN — FAMOTIDINE 20 MG: 20 TABLET, FILM COATED ORAL at 10:01

## 2025-01-16 RX ADMIN — FENTANYL CITRATE 50 MCG: 50 INJECTION, SOLUTION INTRAMUSCULAR; INTRAVENOUS at 12:01

## 2025-01-16 RX ADMIN — PROPOFOL 200 MG: 10 INJECTION, EMULSION INTRAVENOUS at 12:01

## 2025-01-16 RX ADMIN — ACETAMINOPHEN 1000 MG: 500 TABLET, FILM COATED ORAL at 10:01

## 2025-01-16 RX ADMIN — MUPIROCIN: 20 OINTMENT TOPICAL at 10:01

## 2025-01-16 RX ADMIN — OXYCODONE 5 MG: 5 TABLET ORAL at 01:01

## 2025-01-16 RX ADMIN — FENTANYL CITRATE 25 MCG: 50 INJECTION, SOLUTION INTRAMUSCULAR; INTRAVENOUS at 12:01

## 2025-01-16 RX ADMIN — SODIUM CHLORIDE, SODIUM LACTATE, POTASSIUM CHLORIDE, AND CALCIUM CHLORIDE: .6; .31; .03; .02 INJECTION, SOLUTION INTRAVENOUS at 12:01

## 2025-01-16 RX ADMIN — LIDOCAINE HYDROCHLORIDE 60 MG: 20 INJECTION, SOLUTION INTRAVENOUS at 12:01

## 2025-01-16 RX ADMIN — DEXAMETHASONE SODIUM PHOSPHATE 4 MG: 4 INJECTION, SOLUTION INTRAMUSCULAR; INTRAVENOUS at 12:01

## 2025-01-16 NOTE — PLAN OF CARE
Rogelio Davila has met all discharge criteria from Phase II. Vital Signs are stable, ambulating  without difficulty. Discharge instructions given, patient verbalized understanding. Discharged from facility via wheelchair in stable condition.

## 2025-01-16 NOTE — TRANSFER OF CARE
Anesthesia Transfer of Care Note    Patient: Rogelio Daivla    Procedure(s) Performed: Procedure(s) (LRB):  [id number] (N/A)    Patient location: PACU    Anesthesia Type: general    Transport from OR: Transported from OR on 6-10 L/min O2 by face mask with adequate spontaneous ventilation    Post pain: adequate analgesia    Post assessment: no apparent anesthetic complications and tolerated procedure well    Post vital signs: stable    Level of consciousness: awake and alert    Nausea/Vomiting: no nausea/vomiting    Complications: none    Transfer of care protocol was followed      Last vitals: Visit Vitals  BP (!) 143/71 (BP Location: Right arm, Patient Position: Lying)   Pulse 65   Temp 36.9 °C (98.4 °F) (Oral)   Resp 18   LMP 11/24/2010 (Approximate)   SpO2 97%   Breastfeeding No

## 2025-01-16 NOTE — ANESTHESIA POSTPROCEDURE EVALUATION
Anesthesia Post Evaluation    Patient: Rogelio Davila    Procedure(s) Performed: Procedure(s) (LRB):  [id number] (N/A)    Final Anesthesia Type: general      Patient location during evaluation: PACU  Patient participation: Yes- Able to Participate  Level of consciousness: awake and alert  Post-procedure vital signs: reviewed and stable  Pain management: adequate  Airway patency: patent    PONV status at discharge: No PONV  Anesthetic complications: no      Cardiovascular status: blood pressure returned to baseline  Respiratory status: spontaneous ventilation  Hydration status: euvolemic  Follow-up not needed.          Vitals Value Taken Time   /70 01/16/25 1331   Temp 36.6 °C (97.9 °F) 01/16/25 1251   Pulse 60 01/16/25 1334   Resp 18 01/16/25 1330   SpO2 93 % 01/16/25 1334   Vitals shown include unfiled device data.      Event Time   Out of Recovery 13:37:55         Pain/Rober Score: Pain Rating Prior to Med Admin: 4 (1/16/2025  1:26 PM)  Rober Score: 10 (1/16/2025  1:40 PM)

## 2025-01-16 NOTE — OP NOTE
[facility] ([place])  Surgery Department  Operative Note    SUMMARY     Date of Procedure: 1/16/2025     Procedure: Procedure(s) (LRB):  [id number] (N/A)     Surgeons and Role:     * Rachell Brewster MD - Primary    Assisting Surgeon: None    Pre-Operative Diagnosis: PMB (postmenopausal bleeding) [N95.0]  Thickened endometrium [R93.89]    Post-Operative Diagnosis: Post-Op Diagnosis Codes:     * PMB (postmenopausal bleeding) [N95.0]     * Thickened endometrium [R93.89]     * Endometrial polyp [N84.0]     * Status post hysteroscopic polypectomy [Z98.890]    Anesthesia: General    Technical Procedures Used:  After appropriate consents had been obtained, the patient was taken to the operating room and placed under adequate general anesthesia.  She was placed in a dorsal lithotomy position and properly positioned in Yellofin stirrups for a vaginal procedure.  She was then prepped and draped in the appropriate sterile fashion for vaginal procedure.  The bladder was emptied of approximately 100 cc of clear yellow urine.  Weighted speculum was put in place and a tenaculum was placed on the anterior lip of the cervix.  Uterus was sounded to 8 cm.  Cervix was gently and gradually dilated from a 3.  To a 7. Hegar dilator without difficulty.  Operative hysteroscope was then placed into the endometrial cavity and the cavity was visualized.  Including both ostia.  A large thrombosed endometrial polyp was noted arising from the area near the right cornu and extending down into the endocervix.  Using the MyoSure reach, the entire polyp was excised down to its base.  Additional slight thickening of the endometrium near the fundus was removed with the reach.    The hysteroscope was then removed and a brief sharp curettage was performed with a small amount of additional tissue being excised and sent to pathology.  Tenaculum and speculum were then removed and minimal bleeding was noted.  Patient was  taken down from lithotomy position and sent to recovery in stable condition.  Description of the Findings of the Procedure: Large endometrial polyp arising near RIght cornu.    Significant Surgical Tasks Conducted by the Assistant(s), if Applicable: NA    Complications: No    Estimated Blood Loss (EBL): * No values recorded between 1/16/2025 12:20 PM and 1/16/2025 12:45 PM *    Specimens:   Specimen (24h ago, onward)      Endometrial polyp, Endometrial scrapings                    Condition: Stable    Disposition: PACU - hemodynamically stable.

## 2025-01-16 NOTE — DISCHARGE SUMMARY
[facility] ([place])  Discharge Note  Short Stay    Procedure(s) (LRB):  [id number] (N/A)      OUTCOME: Patient tolerated treatment/procedure well without complication and is now ready for discharge.    DISPOSITION: Home or Self Care    FINAL DIAGNOSIS:  <principal problem not specified>    FOLLOWUP: In clinic    DISCHARGE INSTRUCTIONS:    Discharge Procedure Orders   Diet Adult Regular     Pelvic Rest   Order Comments: Pelvic rest until follow up visit. Nothing in vagina -no sex, tampons, douching, etc.     No dressing needed     Notify your health care provider if you experience any of the following:  temperature >100.4     Notify your health care provider if you experience any of the following:  persistent nausea and vomiting or diarrhea     Notify your health care provider if you experience any of the following:  severe uncontrolled pain     Notify your health care provider if you experience any of the following:  redness, tenderness, or signs of infection (pain, swelling, redness, odor or green/yellow discharge around incision site)     Notify your health care provider if you experience any of the following:  difficulty breathing or increased cough     Notify your health care provider if you experience any of the following:  severe persistent headache     Notify your health care provider if you experience any of the following:  worsening rash     Notify your health care provider if you experience any of the following:  persistent dizziness, light-headedness, or visual disturbances     Notify your health care provider if you experience any of the following:  increased confusion or weakness     Notify your health care provider if you experience any of the following:   Order Comments: Vaginal bleeding greater than 2 pads per 1 hour for 2 consecutive hours     Activity as tolerated        TIME SPENT ON DISCHARGE: 15 minutes

## 2025-01-16 NOTE — DISCHARGE INSTRUCTIONS
Home Care Instruction D&C Hysteroscopy             ACTIVITY LEVEL:  If you received sedation and/or an anesthetic, you may feel sleepy for several hours. Rest until you feel more  awake. Gradually resume your normal activities.    DIET:  At home, continue with liquids. If there is no nausea, you may eat a soft diet and gradually resume a regular diet.    BATHING:  You may shower  as desired in one day.  You should avoid tub baths, hot tubs and swimming pools until seen by your physician for a post-op follow up.    CARE:  You can expect watery or bloody vaginal discharge for several days. Do not use tampons, please only use pads. Sexual activity is restricted as advised by your doctor.    MEDICATIONS:  You will receive instructions for any pain and/or antibiotic prescriptions. Pain medication should be taken only if needed and as directed. Antibiotics, if ordered, should be taken as directed until the entire prescription is completed.    ADDITIONAL INFORMATION:  __________________________________________________________________________________________  WHEN TO CALL THE DOCTOR:   For any heavy vaginal bleeding   Fever over 101°F (38.4°C)   Any lower abdominal pain not relieved by the pain mediation  RETURN APPOINTMENT:  __________________________________________________________________________________________  FOR EMERGENCIES:  If any unusual problems or difficulties occur, contact  __________________ or the resident at [phone] (page ) or the Clinic office, [phone].

## 2025-01-16 NOTE — ANESTHESIA PROCEDURE NOTES
Intubation    Date/Time: 1/16/2025 12:07 PM    Performed by: Wang Austin CRNA  Authorized by: Zaheer Delcid MD    Intubation:     Induction:  Intravenous    Intubated:  Postinduction    Mask Ventilation:  Not attempted    Attempts:  1    Attempted By:  CRNA    Difficult Airway Encountered?: No      Complications:  None    Airway Device:  Supraglottic airway/LMA    Airway Device Size:  4.0    Placement Verified By:  Capnometry    Complicating Factors:  None    Findings Post-Intubation:  BS equal bilateral and atraumatic/condition of teeth unchanged

## 2025-01-17 VITALS
RESPIRATION RATE: 18 BRPM | OXYGEN SATURATION: 95 % | HEART RATE: 65 BPM | TEMPERATURE: 98 F | DIASTOLIC BLOOD PRESSURE: 72 MMHG | SYSTOLIC BLOOD PRESSURE: 148 MMHG

## 2025-01-24 LAB
FINAL PATHOLOGIC DIAGNOSIS: NORMAL
GROSS: NORMAL
Lab: NORMAL

## 2025-01-30 ENCOUNTER — PATIENT MESSAGE (OUTPATIENT)
Dept: OBSTETRICS AND GYNECOLOGY | Facility: CLINIC | Age: 65
End: 2025-01-30
Payer: COMMERCIAL

## 2025-01-30 DIAGNOSIS — N85.01 COMPLEX ENDOMETRIAL HYPERPLASIA: Primary | ICD-10-CM

## 2025-01-31 ENCOUNTER — TELEPHONE (OUTPATIENT)
Dept: GYNECOLOGIC ONCOLOGY | Facility: CLINIC | Age: 65
End: 2025-01-31
Payer: COMMERCIAL

## 2025-01-31 ENCOUNTER — PATIENT MESSAGE (OUTPATIENT)
Dept: OBSTETRICS AND GYNECOLOGY | Facility: CLINIC | Age: 65
End: 2025-01-31
Payer: COMMERCIAL

## 2025-01-31 DIAGNOSIS — N95.1 MENOPAUSAL SYNDROME: ICD-10-CM

## 2025-01-31 DIAGNOSIS — N85.01 COMPLEX ENDOMETRIAL HYPERPLASIA: Primary | ICD-10-CM

## 2025-01-31 RX ORDER — ESTRADIOL 0.05 MG/D
1 FILM, EXTENDED RELEASE TRANSDERMAL
Qty: 24 PATCH | Refills: 0 | Status: SHIPPED | OUTPATIENT
Start: 2025-02-03

## 2025-01-31 NOTE — TELEPHONE ENCOUNTER
Called and spoke with patient re: complex hyperplasia , will refer for consult to GYN ONC for surgical management.

## 2025-01-31 NOTE — TELEPHONE ENCOUNTER
Called patient to discuss results of Hysteroscopy showing Complex Hyperplasia in thrombosed polyp.     Will advise of need for Consult with GYN Onc.     Will attempt repeat Call in AM>

## 2025-02-26 ENCOUNTER — OFFICE VISIT (OUTPATIENT)
Dept: GYNECOLOGIC ONCOLOGY | Facility: CLINIC | Age: 65
End: 2025-02-26
Payer: COMMERCIAL

## 2025-02-26 VITALS
WEIGHT: 218 LBS | HEIGHT: 62 IN | SYSTOLIC BLOOD PRESSURE: 180 MMHG | RESPIRATION RATE: 17 BRPM | TEMPERATURE: 98 F | HEART RATE: 60 BPM | OXYGEN SATURATION: 95 % | DIASTOLIC BLOOD PRESSURE: 86 MMHG | BODY MASS INDEX: 40.12 KG/M2

## 2025-02-26 DIAGNOSIS — N85.01 COMPLEX ENDOMETRIAL HYPERPLASIA WITHOUT ATYPIA: Primary | ICD-10-CM

## 2025-02-26 DIAGNOSIS — N85.01 COMPLEX ENDOMETRIAL HYPERPLASIA: ICD-10-CM

## 2025-02-26 PROCEDURE — 99999 PR PBB SHADOW E&M-EST. PATIENT-LVL V: CPT | Mod: PBBFAC,,, | Performed by: OBSTETRICS & GYNECOLOGY

## 2025-02-26 NOTE — H&P (VIEW-ONLY)
Subjective:      Patient ID: Rogelio Davila is a 64 y.o. female.    Chief Complaint: Consult (New patient)      HPI    64 yr old para 2 referred from Dr Brewster for complex hyperplasia without atypia. Workup for VB she experienced in November when she ran out of her estradiol patches.    2024 pelvic US  Uterus 9.0 x 3.2 x 5.3 cm, ES 8 mm.  R ov 3.4 x 2.8 x 3.0 cm, Anechoic structure measuring 1.8 x 1.5 x 1.4 cm, likely representing a small cyst.  L ov 2.2 x 1.5 x 1.6 cm  No FF    2025 taken to OR for hysteroscopy, D&C  Complex hyperplasia without atypia arising in a polyp    LMP age 50 on HRT    HTN. BMI 39    Prior c section x 2.    Family history for pat aunt - breast cancer. No gyn/colon cancer       Review of Systems   Constitutional:  Negative for chills, diaphoresis, fatigue and fever.   Respiratory:  Negative for chest tightness, shortness of breath and wheezing.    Cardiovascular:  Negative for chest pain, palpitations and leg swelling.   Gastrointestinal:  Negative for abdominal pain, constipation, diarrhea, nausea and vomiting.   Genitourinary:  Positive for vaginal bleeding. Negative for difficulty urinating, dysuria, flank pain, frequency, genital sores, hematuria, pelvic pain, urgency, vaginal discharge and vaginal pain.   Skin:  Negative for color change.   Neurological:  Negative for dizziness, light-headedness and headaches.   Psychiatric/Behavioral:  Negative for agitation.        Past Medical History:   Diagnosis Date    Abnormal Pap smear of cervix     Hyperlipidemia     Hypertension     PMR (polymyalgia rheumatica)     newly diagnosis    SVT (supraventricular tachycardia)     only one episode-     Past Surgical History:   Procedure Laterality Date     SECTION      x2    COLPOSCOPY      HYSTEROSCOPY WITH DILATION AND CURETTAGE OF UTERUS N/A 2025    Procedure: [id number];  Surgeon: Rachell Brewster MD;  Location: [facility];  Service: OB/GYN;   Laterality: N/A;     Family History   Problem Relation Name Age of Onset    Breast cancer Paternal Aunt  58    Cancer Maternal Grandfather          lung    Diabetes Maternal Grandfather      Stroke Father  62    Hypertension Father      Heart attacks under age 50 Father          smoker, drinker    Hypertension Mother      Melanoma Neg Hx      Ovarian cancer Neg Hx      Colon cancer Neg Hx       Social History[1]  Current Outpatient Medications   Medication Sig    acetaminophen (TYLENOL) 650 MG TbSR Take 1 tablet (650 mg total) by mouth every 6 (six) hours. Alternate between ibuprofen and tylenol every 3 hours. For example: @0800: ibuprofen 600mg @1100: tylenol 650mg @1400: ibuprofen 600mg @1700: tylenol 650 mg @2000: ibuprofen 600mg    biotin 1 mg tablet Take 1,000 mcg by mouth as needed.    calcium carbonate (OS-HECTOR) 600 mg calcium (1,500 mg) Tab Take 600 mg by mouth once.    cetirizine (ZYRTEC) 10 MG tablet Take 10 mg by mouth every evening.    docusate sodium (COLACE) 100 MG capsule Take 1 capsule (100 mg total) by mouth 2 (two) times daily.    estradioL (ESTRACE) 0.01 % (0.1 mg/gram) vaginal cream Place 1 g vaginally 3 (three) times a week.    estradiol 0.05 mg/24 hr td ptsw (VIVELLE-DOT) 0.05 mg/24 hr Place 1 patch onto the skin twice a week.    hydrochlorothiazide (HYDRODIURIL) 25 MG tablet Take 25 mg by mouth once daily.     ibuprofen (ADVIL,MOTRIN) 600 MG tablet Take 1 tablet (600 mg total) by mouth every 6 (six) hours as needed for Pain. Alternate between ibuprofen and tylenol every 3 hours. For example: @0800: ibuprofen 600mg @1100: tylenol 650mg @1400: ibuprofen 600mg @1700: tylenol 650 mg @2000: ibuprofen 600mg    metoprolol tartrate (LOPRESSOR) 25 MG tablet Take 1 tablet by mouth 2 (two) times daily.    MULTIVITAMIN ORAL Take by mouth.    progesterone (PROMETRIUM) 200 MG capsule Take 1 capsule (200 mg total) by mouth every evening.    UNABLE TO FIND Inject as directed Every 15 days. Kevzara injection     meloxicam (MOBIC) 15 MG tablet Take 15 mg by mouth daily as needed.     No current facility-administered medications for this visit.     Review of patient's allergies indicates:  No Known Allergies    Objective:   Physical Exam:   Constitutional: She is oriented to person, place, and time. She appears well-developed and well-nourished. No distress.    HENT:   Head: Normocephalic and atraumatic.    Eyes: No scleral icterus.     Cardiovascular:       Exam reveals no cyanosis and no edema.        Pulmonary/Chest: Effort normal. No respiratory distress.        Abdominal: Soft. She exhibits no distension and no fluid wave. There is no abdominal tenderness. There is no rigidity.             Musculoskeletal: Normal range of motion and moves all extremeties. No edema.       Neurological: She is alert and oriented to person, place, and time.    Skin: Skin is warm. No rash noted. No cyanosis or erythema.    Psychiatric: She has a normal mood and affect. Thought content normal.       Assessment:     1. Complex endometrial hyperplasia without atypia    2. Complex endometrial hyperplasia        Plan:       We discussed the natural history of complex endometrial hyperplasia. We reviewed management options including medical with progestin therapy or definitive surgical management with hysterectomy. R/B/A were reviewed. She desires definitive surgical management.     Plan for RTLH/BSO.    The risks, benefits, and indications of the procedure were discussed with the patient and her family members if present.  These included bleeding, transfusion, infection, damage to surrounding tissues (bowel, bladder, ureter), wound separation, lymphedema, conversion to laparotomy if laparoscopic, perioperative cardiac events, VTE, pneumonia, and possible death.  We discussed possible need for bowel or urologic resection, temporary or permanent ostomies. She voiced understanding, all questions were answered and consents were signed.      I spent  approximately 45 minutes reviewing the available records and evaluating the patient, out of which over 50% of the time was spent face to face with the patient in counseling and coordinating this patient's care.         [1]   Social History  Socioeconomic History    Marital status:    Tobacco Use    Smoking status: Former     Current packs/day: 0.00     Types: Cigarettes     Quit date: 2013     Years since quittin.6    Smokeless tobacco: Former   Substance and Sexual Activity    Alcohol use: Yes     Comment: occasionally    Drug use: No    Sexual activity: Not Currently     Comment:    Other Topics Concern    Are you pregnant or think you may be? No     Social Drivers of Health     Financial Resource Strain: Medium Risk (2024)    Overall Financial Resource Strain (CARDIA)     Difficulty of Paying Living Expenses: Somewhat hard   Food Insecurity: No Food Insecurity (2024)    Hunger Vital Sign     Worried About Running Out of Food in the Last Year: Never true     Ran Out of Food in the Last Year: Never true   Transportation Needs: No Transportation Needs (2022)    Received from [facility] the [facility] - Transportation     Lack of Transportation (Medical): No     Lack of Transportation (Non-Medical): No   Physical Activity: Insufficiently Active (2024)    Exercise Vital Sign     Days of Exercise per Week: 2 days     Minutes of Exercise per Session: 20 min   Stress: No Stress Concern Present (2024)    [employer] - Occupational Stress Questionnaire     Feeling of Stress : Only a little   Housing Stability: Unknown (2024)    Housing Stability Vital Sign     Unable to Pay for Housing in the Last Year: No

## 2025-02-26 NOTE — PROGRESS NOTES
Written/verbal surgery education provided to patient. VU. All questions and concerns addressed. Copies of signed consents given to patient. Surgery scheduler to call and schedule all surgery related appts.

## 2025-02-26 NOTE — PROGRESS NOTES
Subjective:      Patient ID: Rogelio Davila is a 64 y.o. female.    Chief Complaint: Consult (New patient)      HPI    64 yr old para 2 referred from Dr Brewster for complex hyperplasia without atypia. Workup for VB she experienced in November when she ran out of her estradiol patches.    2024 pelvic US  Uterus 9.0 x 3.2 x 5.3 cm, ES 8 mm.  R ov 3.4 x 2.8 x 3.0 cm, Anechoic structure measuring 1.8 x 1.5 x 1.4 cm, likely representing a small cyst.  L ov 2.2 x 1.5 x 1.6 cm  No FF    2025 taken to OR for hysteroscopy, D&C  Complex hyperplasia without atypia arising in a polyp    LMP age 50 on HRT    HTN. BMI 39    Prior c section x 2.    Family history for pat aunt - breast cancer. No gyn/colon cancer       Review of Systems   Constitutional:  Negative for chills, diaphoresis, fatigue and fever.   Respiratory:  Negative for chest tightness, shortness of breath and wheezing.    Cardiovascular:  Negative for chest pain, palpitations and leg swelling.   Gastrointestinal:  Negative for abdominal pain, constipation, diarrhea, nausea and vomiting.   Genitourinary:  Positive for vaginal bleeding. Negative for difficulty urinating, dysuria, flank pain, frequency, genital sores, hematuria, pelvic pain, urgency, vaginal discharge and vaginal pain.   Skin:  Negative for color change.   Neurological:  Negative for dizziness, light-headedness and headaches.   Psychiatric/Behavioral:  Negative for agitation.        Past Medical History:   Diagnosis Date    Abnormal Pap smear of cervix     Hyperlipidemia     Hypertension     PMR (polymyalgia rheumatica)     newly diagnosis    SVT (supraventricular tachycardia)     only one episode-     Past Surgical History:   Procedure Laterality Date     SECTION      x2    COLPOSCOPY      HYSTEROSCOPY WITH DILATION AND CURETTAGE OF UTERUS N/A 2025    Procedure: [id number];  Surgeon: Rachell Brewster MD;  Location: [facility];  Service: OB/GYN;   Laterality: N/A;     Family History   Problem Relation Name Age of Onset    Breast cancer Paternal Aunt  58    Cancer Maternal Grandfather          lung    Diabetes Maternal Grandfather      Stroke Father  62    Hypertension Father      Heart attacks under age 50 Father          smoker, drinker    Hypertension Mother      Melanoma Neg Hx      Ovarian cancer Neg Hx      Colon cancer Neg Hx       Social History[1]  Current Outpatient Medications   Medication Sig    acetaminophen (TYLENOL) 650 MG TbSR Take 1 tablet (650 mg total) by mouth every 6 (six) hours. Alternate between ibuprofen and tylenol every 3 hours. For example: @0800: ibuprofen 600mg @1100: tylenol 650mg @1400: ibuprofen 600mg @1700: tylenol 650 mg @2000: ibuprofen 600mg    biotin 1 mg tablet Take 1,000 mcg by mouth as needed.    calcium carbonate (OS-HECTOR) 600 mg calcium (1,500 mg) Tab Take 600 mg by mouth once.    cetirizine (ZYRTEC) 10 MG tablet Take 10 mg by mouth every evening.    docusate sodium (COLACE) 100 MG capsule Take 1 capsule (100 mg total) by mouth 2 (two) times daily.    estradioL (ESTRACE) 0.01 % (0.1 mg/gram) vaginal cream Place 1 g vaginally 3 (three) times a week.    estradiol 0.05 mg/24 hr td ptsw (VIVELLE-DOT) 0.05 mg/24 hr Place 1 patch onto the skin twice a week.    hydrochlorothiazide (HYDRODIURIL) 25 MG tablet Take 25 mg by mouth once daily.     ibuprofen (ADVIL,MOTRIN) 600 MG tablet Take 1 tablet (600 mg total) by mouth every 6 (six) hours as needed for Pain. Alternate between ibuprofen and tylenol every 3 hours. For example: @0800: ibuprofen 600mg @1100: tylenol 650mg @1400: ibuprofen 600mg @1700: tylenol 650 mg @2000: ibuprofen 600mg    metoprolol tartrate (LOPRESSOR) 25 MG tablet Take 1 tablet by mouth 2 (two) times daily.    MULTIVITAMIN ORAL Take by mouth.    progesterone (PROMETRIUM) 200 MG capsule Take 1 capsule (200 mg total) by mouth every evening.    UNABLE TO FIND Inject as directed Every 15 days. Kevzara injection     meloxicam (MOBIC) 15 MG tablet Take 15 mg by mouth daily as needed.     No current facility-administered medications for this visit.     Review of patient's allergies indicates:  No Known Allergies    Objective:   Physical Exam:   Constitutional: She is oriented to person, place, and time. She appears well-developed and well-nourished. No distress.    HENT:   Head: Normocephalic and atraumatic.    Eyes: No scleral icterus.     Cardiovascular:       Exam reveals no cyanosis and no edema.        Pulmonary/Chest: Effort normal. No respiratory distress.        Abdominal: Soft. She exhibits no distension and no fluid wave. There is no abdominal tenderness. There is no rigidity.             Musculoskeletal: Normal range of motion and moves all extremeties. No edema.       Neurological: She is alert and oriented to person, place, and time.    Skin: Skin is warm. No rash noted. No cyanosis or erythema.    Psychiatric: She has a normal mood and affect. Thought content normal.       Assessment:     1. Complex endometrial hyperplasia without atypia    2. Complex endometrial hyperplasia        Plan:       We discussed the natural history of complex endometrial hyperplasia. We reviewed management options including medical with progestin therapy or definitive surgical management with hysterectomy. R/B/A were reviewed. She desires definitive surgical management.     Plan for RTLH/BSO.    The risks, benefits, and indications of the procedure were discussed with the patient and her family members if present.  These included bleeding, transfusion, infection, damage to surrounding tissues (bowel, bladder, ureter), wound separation, lymphedema, conversion to laparotomy if laparoscopic, perioperative cardiac events, VTE, pneumonia, and possible death.  We discussed possible need for bowel or urologic resection, temporary or permanent ostomies. She voiced understanding, all questions were answered and consents were signed.      I spent  approximately 45 minutes reviewing the available records and evaluating the patient, out of which over 50% of the time was spent face to face with the patient in counseling and coordinating this patient's care.         [1]   Social History  Socioeconomic History    Marital status:    Tobacco Use    Smoking status: Former     Current packs/day: 0.00     Types: Cigarettes     Quit date: 2013     Years since quittin.6    Smokeless tobacco: Former   Substance and Sexual Activity    Alcohol use: Yes     Comment: occasionally    Drug use: No    Sexual activity: Not Currently     Comment:    Other Topics Concern    Are you pregnant or think you may be? No     Social Drivers of Health     Financial Resource Strain: Medium Risk (2024)    Overall Financial Resource Strain (CARDIA)     Difficulty of Paying Living Expenses: Somewhat hard   Food Insecurity: No Food Insecurity (2024)    Hunger Vital Sign     Worried About Running Out of Food in the Last Year: Never true     Ran Out of Food in the Last Year: Never true   Transportation Needs: No Transportation Needs (2022)    Received from [facility] - Transportation     Lack of Transportation (Medical): No     Lack of Transportation (Non-Medical): No   Physical Activity: Insufficiently Active (2024)    Exercise Vital Sign     Days of Exercise per Week: 2 days     Minutes of Exercise per Session: 20 min   Stress: No Stress Concern Present (2024)    [employer] - Occupational Stress Questionnaire     Feeling of Stress : Only a little   Housing Stability: Unknown (2024)    Housing Stability Vital Sign     Unable to Pay for Housing in the Last Year: No

## 2025-03-05 ENCOUNTER — TELEPHONE (OUTPATIENT)
Dept: GYNECOLOGIC ONCOLOGY | Facility: CLINIC | Age: 65
End: 2025-03-05
Payer: COMMERCIAL

## 2025-03-05 DIAGNOSIS — N95.0 PMB (POSTMENOPAUSAL BLEEDING): ICD-10-CM

## 2025-03-05 DIAGNOSIS — R93.89 THICKENED ENDOMETRIUM: ICD-10-CM

## 2025-03-05 DIAGNOSIS — Z98.890 STATUS POST HYSTEROSCOPY: ICD-10-CM

## 2025-03-05 DIAGNOSIS — N85.01 COMPLEX ENDOMETRIAL HYPERPLASIA WITHOUT ATYPIA: Primary | ICD-10-CM

## 2025-03-06 DIAGNOSIS — N95.1 MENOPAUSAL SYNDROME: ICD-10-CM

## 2025-03-06 RX ORDER — ESTRADIOL 0.05 MG/D
1 FILM, EXTENDED RELEASE TRANSDERMAL
Qty: 24 PATCH | Refills: 0 | Status: SHIPPED | OUTPATIENT
Start: 2025-03-06

## 2025-03-20 ENCOUNTER — ANESTHESIA EVENT (OUTPATIENT)
Dept: SURGERY | Facility: OTHER | Age: 65
End: 2025-03-20
Payer: COMMERCIAL

## 2025-03-20 NOTE — ANESTHESIA PREPROCEDURE EVALUATION
03/20/2025  Rogelio Davila is a 64 y.o., female.      Pre-op Assessment    I have reviewed the Patient Summary Reports.     I have reviewed the Nursing Notes. I have reviewed the NPO Status.   I have reviewed the Medications.     Review of Systems  Anesthesia Hx:  No problems with previous Anesthesia             Denies Family Hx of Anesthesia complications.    Denies Personal Hx of Anesthesia complications.                    Social:  Former Smoker       Hematology/Oncology:  Hematology Normal   Oncology Normal                                   EENT/Dental:  EENT/Dental Normal           Cardiovascular:     Hypertension           hyperlipidemia    Hx SVT                     Hypertension         Pulmonary:        Sleep Apnea                Renal/:  Renal/ Normal                 Hepatic/GI:  Hepatic/GI Normal                    Musculoskeletal:  Musculoskeletal Normal    Polymyalgia Rheumatica            Neurological:  Neurology Normal          BPPV                            Endocrine:        Obesity / BMI > 30  Dermatological:  Skin Normal    Psych:  Psychiatric History anxiety depression ADHD             Physical Exam  General: Well nourished and Cooperative    Airway:  Mallampati: II   Mouth Opening: Normal  TM Distance: Normal  Tongue: Normal  Neck ROM: Normal ROM    Dental:  Intact      Anesthesia Plan  Type of Anesthesia, risks & benefits discussed:    Anesthesia Type: Gen ETT  Intra-op Monitoring Plan: Standard ASA Monitors  Post Op Pain Control Plan: multimodal analgesia  Induction:  IV  Airway Plan: Video  Informed Consent: Informed consent signed with the Patient and all parties understand the risks and agree with anesthesia plan.  All questions answered.   ASA Score: 2  Day of Surgery Review of History & Physical: H&P Update referred to the surgeon/provider.  Anesthesia Plan Notes:   Needs  CBC/BMP/T&S    1/2025 - had hysteroscopy at [facility]    Ready For Surgery From Anesthesia Perspective.     .

## 2025-03-24 ENCOUNTER — HOSPITAL ENCOUNTER (OUTPATIENT)
Dept: PREADMISSION TESTING | Facility: OTHER | Age: 65
Discharge: HOME | End: 2025-03-24
Attending: OBSTETRICS & GYNECOLOGY
Payer: COMMERCIAL

## 2025-03-24 VITALS — BODY MASS INDEX: 39.56 KG/M2 | HEIGHT: 62 IN | HEART RATE: 71 BPM | TEMPERATURE: 97 F | WEIGHT: 215 LBS

## 2025-03-24 DIAGNOSIS — Z01.818 PRE-OP TESTING: Primary | ICD-10-CM

## 2025-03-24 LAB
ABORH RETYPE: NORMAL
ABSOLUTE EOSINOPHIL (OHS): 0.12 K/UL
ABSOLUTE MONOCYTE (OHS): 0.58 K/UL (ref 0.3–1)
ABSOLUTE NEUTROPHIL COUNT (OHS): 5 K/UL (ref 1.8–7.7)
ANION GAP (OHS): 7 MMOL/L (ref 8–16)
BASOPHILS # BLD AUTO: 0.03 K/UL
BASOPHILS NFR BLD AUTO: 0.4 %
BUN SERPL-MCNC: 14 MG/DL (ref 8–23)
CALCIUM SERPL-MCNC: 9.5 MG/DL (ref 8.7–10.5)
CHLORIDE SERPL-SCNC: 107 MMOL/L (ref 95–110)
CO2 SERPL-SCNC: 26 MMOL/L (ref 23–29)
CREAT SERPL-MCNC: 0.7 MG/DL (ref 0.5–1.4)
ERYTHROCYTE [DISTWIDTH] IN BLOOD BY AUTOMATED COUNT: 12.2 % (ref 11.5–14.5)
GFR SERPLBLD CREATININE-BSD FMLA CKD-EPI: >60 ML/MIN/1.73/M2
GLUCOSE SERPL-MCNC: 100 MG/DL (ref 70–110)
HCT VFR BLD AUTO: 40.6 % (ref 37–48.5)
HGB BLD-MCNC: 13.4 GM/DL (ref 12–16)
IMM GRANULOCYTES # BLD AUTO: 0.03 K/UL (ref 0–0.04)
IMM GRANULOCYTES NFR BLD AUTO: 0.4 % (ref 0–0.5)
INDIRECT COOMBS: NORMAL
LYMPHOCYTES # BLD AUTO: 1.06 K/UL (ref 1–4.8)
MCH RBC QN AUTO: 30.5 PG (ref 27–50)
MCHC RBC AUTO-ENTMCNC: 33 G/DL (ref 32–36)
MCV RBC AUTO: 93 FL (ref 82–98)
NUCLEATED RBC (/100WBC) (OHS): 0 /100 WBC
PLATELET # BLD AUTO: 255 K/UL (ref 150–450)
PMV BLD AUTO: 11.9 FL (ref 9.2–12.9)
POTASSIUM SERPL-SCNC: 4.4 MMOL/L (ref 3.5–5.1)
RBC # BLD AUTO: 4.39 M/UL (ref 4–5.4)
RELATIVE EOSINOPHIL (OHS): 1.8 %
RELATIVE LYMPHOCYTE (OHS): 15.5 % (ref 18–48)
RELATIVE MONOCYTE (OHS): 8.5 % (ref 4–15)
RELATIVE NEUTROPHIL (OHS): 73.4 % (ref 38–73)
RH BLD: NORMAL
SODIUM SERPL-SCNC: 140 MMOL/L (ref 136–145)
SPECIMEN OUTDATE: NORMAL
WBC # BLD AUTO: 6.82 K/UL (ref 3.9–12.7)

## 2025-03-24 PROCEDURE — 36415 COLL VENOUS BLD VENIPUNCTURE: CPT | Mod: 59 | Performed by: ANESTHESIOLOGY

## 2025-03-24 PROCEDURE — 85025 COMPLETE CBC W/AUTO DIFF WBC: CPT | Performed by: ANESTHESIOLOGY

## 2025-03-24 PROCEDURE — 84295 ASSAY OF SERUM SODIUM: CPT | Performed by: ANESTHESIOLOGY

## 2025-03-24 PROCEDURE — 86850 RBC ANTIBODY SCREEN: CPT | Performed by: ANESTHESIOLOGY

## 2025-03-24 RX ORDER — ACETAMINOPHEN 500 MG
1000 TABLET ORAL
Status: CANCELLED | OUTPATIENT
Start: 2025-03-24 | End: 2025-03-24

## 2025-03-24 RX ORDER — SODIUM CHLORIDE, SODIUM LACTATE, POTASSIUM CHLORIDE, CALCIUM CHLORIDE 600; 310; 30; 20 MG/100ML; MG/100ML; MG/100ML; MG/100ML
INJECTION, SOLUTION INTRAVENOUS CONTINUOUS
Status: CANCELLED | OUTPATIENT
Start: 2025-03-24

## 2025-03-24 NOTE — DISCHARGE INSTRUCTIONS
Information to Prepare you for your Surgery    PRE-ADMIT TESTING -  [phone]    [address]  [facility]          Your surgery has been scheduled at [facility]. We are pleased to have the opportunity to serve you. For Further Information please call [phone].    On the day of surgery please report to the Information Desk on the 1st floor.    CONTACT YOUR PHYSICIAN'S OFFICE THE DAY PRIOR TO YOUR SURGERY TO OBTAIN YOUR ARRIVAL TIME.     The evening before surgery do not eat anything after 9 p.m. ( this includes hard candy, chewing gum and mints).  You may only have GATORADE, POWERADE AND WATER  from 9 p.m. until you leave your home.   DO NOT DRINK ANY LIQUIDS ON THE WAY TO THE HOSPITAL.      Why does your anesthesiologist allow you to drink Gatorade/Powerade before surgery?  Gatorade/Powerade helps to increase your comfort before surgery and to decrease your nausea after surgery. The carbohydrates in Gatorade/Powerade help reduce your body's stress response to surgery.  If you are a diabetic-drink only water prior to surgery.    Outpatient Surgery- May allow 2 adult (18 and older) Support Persons (1 being the designated ) for all surgical/procedural patients. A breastfeeding mother will be allowed her infant and 2 adult Support Persons. No one under the age of 18 will be allowed in the building. No swapping out of visitors in the [facility].      SPECIAL MEDICATION INSTRUCTIONS: TAKE medications checked off by the Anesthesiologist on your Medication List.    Angiogram Patients: Take medications as instructed by your physician, including aspirin.     Surgery Patients:    If you take ASPIRIN - Your PHYSICIAN/SURGEON will need to inform you IF/OR when you need to stop taking aspirin prior to your surgery.     The week prior to surgery do not ot take any medications containing IBUPROFEN or NSAIDS ( Advil, Motrin, Goodys, BC, Aleve, Naproxen etc)  If you are not sure if you should take a medicine please call your surgeon's office.  Ok to take Tylenol    Do Not Wear any make-up (especially eye make-up) to surgery. Please remove any false eyelashes or eyelash extensions. If you arrive the day of surgery with makeup/eyelashes on you will be required to remove prior to surgery. (There is a risk of corneal abrasions if eye makeup/eyelash extensions are not removed)      Leave all valuables at home.   Do Not wear any jewelry or watches, including any metal in body piercings. Jewelry must be removed prior to coming to the hospital.  There is a possibility that rings that are unable to be removed may be cut off if they are on the surgical extremity.    Please remove all hair extensions, wigs, clips and any other metal accessories/ ornaments from your hair.  These items may pose a flammable/fire risk in Surgery and must be removed.    Do not shave your surgical area at least 5 days prior to your surgery. The surgical prep will be performed at the hospital according to Infection Control regulations.    Contact Lens must be removed before surgery. Either do not wear the contact lens or bring a case and solution for storage.  Please bring a container for eyeglasses or dentures as required.  Bring any paperwork your physician has provided, such as consent forms,  history and physicals, doctor's orders, etc.   Bring comfortable clothes that are loose fitting to wear upon discharge. Take into consideration the type of surgery being performed.  Maintain your diet as advised per your physician the day prior to surgery.      Adequate rest the night before surgery is advised.   Park in the Parking lot behind the hospital or in the [place] [facility] across the street from the parking lot. Parking is complimentary.  If you will be discharged the same day as your procedure, please arrange for a responsible adult to drive you home or to accompany you if traveling by taxi.    YOU WILL NOT BE PERMITTED TO DRIVE OR TO LEAVE THE HOSPITAL ALONE AFTER SURGERY.   If you are being discharged the same day, it is strongly recommended that you arrange for someone to remain with you for the first 24 hrs following your surgery.    The Surgeon will speak to your family/visitor after your surgery regarding the outcome of your surgery and post op care.  The Surgeon may speak to you after your surgery, but there is a possibility you may not remember the details.  Please check with your family members regarding the conversation with the Surgeon.    We strongly recommend whoever is bringing you home be present for discharge instructions.  This will ensure a thorough understanding for your post op home care.    If the patient has fever, cough, or signs/symptoms of Flu or Covid please do not come in for your surgery. Contact your surgeon and your primary care physician for further instructions.           Thank you for your cooperation.  The Staff of [facility].            Bathing Instructions with Hibiclens    Shower the evening before and morning of your procedure with Chlorhexidine (Hibiclens)  do not use Chlorhexidine on your face or genitals. Do not get in your eyes.  Wash your face with water and your regular face wash/soap  Use your regular shampoo  Apply Chlorhexidine (Hibiclens) directly on your skin or on a wet washcloth and wash gently. When showering: Move away from the shower stream when applying Chlorhexidine (Hibiclens) to avoid rinsing off too soon.  Rinse thoroughly with warm water  Do not dilute Chlorhexidine (Hibiclens)   Dry off as usual, do not use any deodorant, powder, body lotions, perfume, after shave or cologne.

## 2025-03-26 ENCOUNTER — TELEPHONE (OUTPATIENT)
Dept: GYNECOLOGIC ONCOLOGY | Facility: CLINIC | Age: 65
End: 2025-03-26
Payer: COMMERCIAL

## 2025-03-27 DIAGNOSIS — N85.01 COMPLEX ENDOMETRIAL HYPERPLASIA WITHOUT ATYPIA: Primary | ICD-10-CM

## 2025-03-27 RX ORDER — MUPIROCIN 20 MG/G
OINTMENT TOPICAL
Status: CANCELLED | OUTPATIENT
Start: 2025-03-27

## 2025-03-27 RX ORDER — HEPARIN SODIUM 5000 [USP'U]/ML
5000 INJECTION, SOLUTION INTRAVENOUS; SUBCUTANEOUS
Status: CANCELLED | OUTPATIENT
Start: 2025-03-28 | End: 2025-03-28

## 2025-03-27 RX ORDER — FAMOTIDINE 20 MG/1
20 TABLET, FILM COATED ORAL
Status: CANCELLED | OUTPATIENT
Start: 2025-03-27

## 2025-03-27 RX ORDER — SODIUM CHLORIDE 9 MG/ML
INJECTION, SOLUTION INTRAVENOUS CONTINUOUS
Status: CANCELLED | OUTPATIENT
Start: 2025-03-27

## 2025-03-27 RX ORDER — CEFAZOLIN SODIUM 2 G/50ML
2 SOLUTION INTRAVENOUS
Status: CANCELLED | OUTPATIENT
Start: 2025-03-27

## 2025-03-28 ENCOUNTER — ANESTHESIA (OUTPATIENT)
Dept: SURGERY | Facility: OTHER | Age: 65
End: 2025-03-28
Payer: COMMERCIAL

## 2025-03-28 ENCOUNTER — HOSPITAL ENCOUNTER (OUTPATIENT)
Facility: OTHER | Age: 65
Discharge: HOME | End: 2025-03-28
Attending: OBSTETRICS & GYNECOLOGY | Admitting: OBSTETRICS & GYNECOLOGY
Payer: COMMERCIAL

## 2025-03-28 VITALS
WEIGHT: 215 LBS | HEART RATE: 73 BPM | SYSTOLIC BLOOD PRESSURE: 150 MMHG | OXYGEN SATURATION: 100 % | BODY MASS INDEX: 39.56 KG/M2 | RESPIRATION RATE: 18 BRPM | DIASTOLIC BLOOD PRESSURE: 75 MMHG | TEMPERATURE: 98 F | HEIGHT: 62 IN

## 2025-03-28 DIAGNOSIS — Z98.890 STATUS POST HYSTEROSCOPY: ICD-10-CM

## 2025-03-28 DIAGNOSIS — N95.0 PMB (POSTMENOPAUSAL BLEEDING): ICD-10-CM

## 2025-03-28 DIAGNOSIS — N85.01 COMPLEX ENDOMETRIAL HYPERPLASIA WITHOUT ATYPIA: ICD-10-CM

## 2025-03-28 DIAGNOSIS — R93.89 THICKENED ENDOMETRIUM: ICD-10-CM

## 2025-03-28 DIAGNOSIS — N95.1 MENOPAUSAL SYNDROME: ICD-10-CM

## 2025-03-28 DIAGNOSIS — Z90.710 HISTORY OF ROBOT-ASSISTED LAPAROSCOPIC HYSTERECTOMY: Primary | ICD-10-CM

## 2025-03-28 LAB — POCT GLUCOSE: 92 MG/DL (ref 70–110)

## 2025-03-28 PROCEDURE — 71000039 HC RECOVERY, EACH ADD'L HOUR: Performed by: OBSTETRICS & GYNECOLOGY

## 2025-03-28 PROCEDURE — 88309 TISSUE EXAM BY PATHOLOGIST: CPT | Mod: TC | Performed by: OBSTETRICS & GYNECOLOGY

## 2025-03-28 PROCEDURE — 37000009 HC ANESTHESIA EA ADD 15 MINS: Performed by: OBSTETRICS & GYNECOLOGY

## 2025-03-28 PROCEDURE — 25000003 PHARM REV CODE 250: Performed by: ANESTHESIOLOGY

## 2025-03-28 PROCEDURE — 58571 TLH W/T/O 250 G OR LESS: CPT | Mod: ,,, | Performed by: OBSTETRICS & GYNECOLOGY

## 2025-03-28 PROCEDURE — 25000003 PHARM REV CODE 250: Performed by: NURSE ANESTHETIST, CERTIFIED REGISTERED

## 2025-03-28 PROCEDURE — 71000016 HC POSTOP RECOV ADDL HR: Performed by: OBSTETRICS & GYNECOLOGY

## 2025-03-28 PROCEDURE — 63600175 PHARM REV CODE 636 W HCPCS: Performed by: OBSTETRICS & GYNECOLOGY

## 2025-03-28 PROCEDURE — 25000003 PHARM REV CODE 250: Performed by: OBSTETRICS & GYNECOLOGY

## 2025-03-28 PROCEDURE — 27201423 OPTIME MED/SURG SUP & DEVICES STERILE SUPPLY: Performed by: OBSTETRICS & GYNECOLOGY

## 2025-03-28 PROCEDURE — 58571 TLH W/T/O 250 G OR LESS: CPT | Mod: AS,,, | Performed by: NURSE PRACTITIONER

## 2025-03-28 PROCEDURE — 82962 GLUCOSE BLOOD TEST: CPT | Performed by: OBSTETRICS & GYNECOLOGY

## 2025-03-28 PROCEDURE — 88309 TISSUE EXAM BY PATHOLOGIST: CPT | Mod: 26,,, | Performed by: PATHOLOGY

## 2025-03-28 PROCEDURE — 37000008 HC ANESTHESIA 1ST 15 MINUTES: Performed by: OBSTETRICS & GYNECOLOGY

## 2025-03-28 PROCEDURE — 71000033 HC RECOVERY, INTIAL HOUR: Performed by: OBSTETRICS & GYNECOLOGY

## 2025-03-28 PROCEDURE — 36000713 HC OR TIME LEV V EA ADD 15 MIN: Performed by: OBSTETRICS & GYNECOLOGY

## 2025-03-28 PROCEDURE — 36000712 HC OR TIME LEV V 1ST 15 MIN: Performed by: OBSTETRICS & GYNECOLOGY

## 2025-03-28 PROCEDURE — 63600175 PHARM REV CODE 636 W HCPCS: Mod: JZ,TB | Performed by: ANESTHESIOLOGY

## 2025-03-28 PROCEDURE — 63600175 PHARM REV CODE 636 W HCPCS: Performed by: NURSE ANESTHETIST, CERTIFIED REGISTERED

## 2025-03-28 PROCEDURE — 71000015 HC POSTOP RECOV 1ST HR: Performed by: OBSTETRICS & GYNECOLOGY

## 2025-03-28 RX ORDER — DEXTROMETHORPHAN HYDROBROMIDE, GUAIFENESIN 5; 100 MG/5ML; MG/5ML
650 LIQUID ORAL EVERY 6 HOURS
Qty: 60 TABLET | Refills: 1 | Status: SHIPPED | OUTPATIENT
Start: 2025-03-28

## 2025-03-28 RX ORDER — ROCURONIUM BROMIDE 10 MG/ML
INJECTION, SOLUTION INTRAVENOUS
Status: DISCONTINUED | OUTPATIENT
Start: 2025-03-28 | End: 2025-03-28

## 2025-03-28 RX ORDER — HYDROMORPHONE HYDROCHLORIDE 2 MG/ML
0.4 INJECTION, SOLUTION INTRAMUSCULAR; INTRAVENOUS; SUBCUTANEOUS EVERY 5 MIN PRN
Status: DISCONTINUED | OUTPATIENT
Start: 2025-03-28 | End: 2025-03-28 | Stop reason: HOSPADM

## 2025-03-28 RX ORDER — OXYCODONE HYDROCHLORIDE 5 MG/1
5 TABLET ORAL EVERY 4 HOURS PRN
Status: DISCONTINUED | OUTPATIENT
Start: 2025-03-28 | End: 2025-03-28 | Stop reason: HOSPADM

## 2025-03-28 RX ORDER — DEXMEDETOMIDINE HYDROCHLORIDE 100 UG/ML
INJECTION, SOLUTION INTRAVENOUS
Status: DISCONTINUED | OUTPATIENT
Start: 2025-03-28 | End: 2025-03-28

## 2025-03-28 RX ORDER — LIDOCAINE HYDROCHLORIDE 20 MG/ML
INJECTION INTRAVENOUS
Status: DISCONTINUED | OUTPATIENT
Start: 2025-03-28 | End: 2025-03-28

## 2025-03-28 RX ORDER — OXYCODONE HYDROCHLORIDE 5 MG/1
5 TABLET ORAL EVERY 4 HOURS PRN
Qty: 10 TABLET | Refills: 0 | Status: SHIPPED | OUTPATIENT
Start: 2025-03-28

## 2025-03-28 RX ORDER — DEXAMETHASONE SODIUM PHOSPHATE 4 MG/ML
INJECTION, SOLUTION INTRA-ARTICULAR; INTRALESIONAL; INTRAMUSCULAR; INTRAVENOUS; SOFT TISSUE
Status: DISCONTINUED | OUTPATIENT
Start: 2025-03-28 | End: 2025-03-28

## 2025-03-28 RX ORDER — FAMOTIDINE 20 MG/1
20 TABLET, FILM COATED ORAL
Status: COMPLETED | OUTPATIENT
Start: 2025-03-28 | End: 2025-03-28

## 2025-03-28 RX ORDER — HEPARIN SODIUM 5000 [USP'U]/ML
5000 INJECTION, SOLUTION INTRAVENOUS; SUBCUTANEOUS
Status: COMPLETED | OUTPATIENT
Start: 2025-03-28 | End: 2025-03-28

## 2025-03-28 RX ORDER — SODIUM CHLORIDE 0.9 % (FLUSH) 0.9 %
3 SYRINGE (ML) INJECTION
Status: DISCONTINUED | OUTPATIENT
Start: 2025-03-28 | End: 2025-03-28 | Stop reason: HOSPADM

## 2025-03-28 RX ORDER — MUPIROCIN 20 MG/G
OINTMENT TOPICAL
Status: DISCONTINUED | OUTPATIENT
Start: 2025-03-28 | End: 2025-03-28 | Stop reason: HOSPADM

## 2025-03-28 RX ORDER — FENTANYL CITRATE 50 UG/ML
INJECTION, SOLUTION INTRAMUSCULAR; INTRAVENOUS
Status: DISCONTINUED | OUTPATIENT
Start: 2025-03-28 | End: 2025-03-28

## 2025-03-28 RX ORDER — SODIUM CHLORIDE 9 MG/ML
INJECTION, SOLUTION INTRAVENOUS CONTINUOUS
Status: DISCONTINUED | OUTPATIENT
Start: 2025-03-28 | End: 2025-03-28 | Stop reason: HOSPADM

## 2025-03-28 RX ORDER — DOCUSATE SODIUM 100 MG/1
100 CAPSULE, LIQUID FILLED ORAL 2 TIMES DAILY
Qty: 60 CAPSULE | Refills: 1 | Status: SHIPPED | OUTPATIENT
Start: 2025-03-28

## 2025-03-28 RX ORDER — PROPOFOL 10 MG/ML
VIAL (ML) INTRAVENOUS
Status: DISCONTINUED | OUTPATIENT
Start: 2025-03-28 | End: 2025-03-28

## 2025-03-28 RX ORDER — ONDANSETRON HYDROCHLORIDE 2 MG/ML
INJECTION, SOLUTION INTRAVENOUS
Status: DISCONTINUED | OUTPATIENT
Start: 2025-03-28 | End: 2025-03-28

## 2025-03-28 RX ORDER — CEFAZOLIN 2 G/1
2 INJECTION, POWDER, FOR SOLUTION INTRAMUSCULAR; INTRAVENOUS
Status: COMPLETED | OUTPATIENT
Start: 2025-03-28 | End: 2025-03-28

## 2025-03-28 RX ORDER — MIDAZOLAM HYDROCHLORIDE 1 MG/ML
INJECTION INTRAMUSCULAR; INTRAVENOUS
Status: DISCONTINUED | OUTPATIENT
Start: 2025-03-28 | End: 2025-03-28

## 2025-03-28 RX ORDER — KETOROLAC TROMETHAMINE 30 MG/ML
INJECTION, SOLUTION INTRAMUSCULAR; INTRAVENOUS
Status: DISCONTINUED | OUTPATIENT
Start: 2025-03-28 | End: 2025-03-28

## 2025-03-28 RX ORDER — GLUCAGON 1 MG
1 KIT INJECTION
Status: DISCONTINUED | OUTPATIENT
Start: 2025-03-28 | End: 2025-03-28 | Stop reason: HOSPADM

## 2025-03-28 RX ORDER — DIPHENHYDRAMINE HYDROCHLORIDE 50 MG/ML
12.5 INJECTION, SOLUTION INTRAMUSCULAR; INTRAVENOUS EVERY 30 MIN PRN
Status: DISCONTINUED | OUTPATIENT
Start: 2025-03-28 | End: 2025-03-28 | Stop reason: HOSPADM

## 2025-03-28 RX ORDER — ACETAMINOPHEN 500 MG
1000 TABLET ORAL
Status: COMPLETED | OUTPATIENT
Start: 2025-03-28 | End: 2025-03-28

## 2025-03-28 RX ORDER — PROCHLORPERAZINE EDISYLATE 5 MG/ML
5 INJECTION INTRAMUSCULAR; INTRAVENOUS EVERY 30 MIN PRN
Status: DISCONTINUED | OUTPATIENT
Start: 2025-03-28 | End: 2025-03-28 | Stop reason: HOSPADM

## 2025-03-28 RX ORDER — SODIUM CHLORIDE, SODIUM LACTATE, POTASSIUM CHLORIDE, CALCIUM CHLORIDE 600; 310; 30; 20 MG/100ML; MG/100ML; MG/100ML; MG/100ML
INJECTION, SOLUTION INTRAVENOUS CONTINUOUS
Status: DISCONTINUED | OUTPATIENT
Start: 2025-03-28 | End: 2025-03-28 | Stop reason: HOSPADM

## 2025-03-28 RX ORDER — IBUPROFEN 600 MG/1
600 TABLET ORAL EVERY 6 HOURS
Qty: 60 TABLET | Refills: 1 | Status: SHIPPED | OUTPATIENT
Start: 2025-03-28

## 2025-03-28 RX ADMIN — PROPOFOL 50 MG: 10 INJECTION, EMULSION INTRAVENOUS at 10:03

## 2025-03-28 RX ADMIN — HYDROMORPHONE HYDROCHLORIDE 0.4 MG: 2 INJECTION, SOLUTION INTRAMUSCULAR; INTRAVENOUS; SUBCUTANEOUS at 12:03

## 2025-03-28 RX ADMIN — KETOROLAC TROMETHAMINE 15 MG: 30 INJECTION, SOLUTION INTRAMUSCULAR at 12:03

## 2025-03-28 RX ADMIN — CEFAZOLIN 2 G: 2 INJECTION, POWDER, FOR SOLUTION INTRAMUSCULAR; INTRAVENOUS at 10:03

## 2025-03-28 RX ADMIN — DEXMEDETOMIDINE 4 MCG: 200 INJECTION, SOLUTION INTRAVENOUS at 11:03

## 2025-03-28 RX ADMIN — MUPIROCIN: 20 OINTMENT TOPICAL at 08:03

## 2025-03-28 RX ADMIN — FENTANYL CITRATE 100 MCG: 50 INJECTION, SOLUTION INTRAMUSCULAR; INTRAVENOUS at 10:03

## 2025-03-28 RX ADMIN — SODIUM CHLORIDE, SODIUM LACTATE, POTASSIUM CHLORIDE, AND CALCIUM CHLORIDE: .6; .31; .03; .02 INJECTION, SOLUTION INTRAVENOUS at 10:03

## 2025-03-28 RX ADMIN — SODIUM CHLORIDE, SODIUM LACTATE, POTASSIUM CHLORIDE, AND CALCIUM CHLORIDE: .6; .31; .03; .02 INJECTION, SOLUTION INTRAVENOUS at 11:03

## 2025-03-28 RX ADMIN — ROCURONIUM BROMIDE 20 MG: 10 INJECTION INTRAVENOUS at 10:03

## 2025-03-28 RX ADMIN — PROPOFOL 150 MG: 10 INJECTION, EMULSION INTRAVENOUS at 10:03

## 2025-03-28 RX ADMIN — GLYCOPYRROLATE 0.1 MG: 0.2 INJECTION, SOLUTION INTRAMUSCULAR; INTRAVENOUS at 10:03

## 2025-03-28 RX ADMIN — OXYCODONE HYDROCHLORIDE 5 MG: 5 TABLET ORAL at 12:03

## 2025-03-28 RX ADMIN — ONDANSETRON HYDROCHLORIDE 4 MG: 2 INJECTION INTRAMUSCULAR; INTRAVENOUS at 12:03

## 2025-03-28 RX ADMIN — HYDROMORPHONE HYDROCHLORIDE 0.4 MG: 2 INJECTION, SOLUTION INTRAMUSCULAR; INTRAVENOUS; SUBCUTANEOUS at 01:03

## 2025-03-28 RX ADMIN — SUGAMMADEX 200 MG: 100 INJECTION, SOLUTION INTRAVENOUS at 12:03

## 2025-03-28 RX ADMIN — ROCURONIUM BROMIDE 10 MG: 10 INJECTION INTRAVENOUS at 11:03

## 2025-03-28 RX ADMIN — HEPARIN SODIUM 5000 UNITS: 5000 INJECTION, SOLUTION INTRAVENOUS; SUBCUTANEOUS at 08:03

## 2025-03-28 RX ADMIN — PROPOFOL 20 MG: 10 INJECTION, EMULSION INTRAVENOUS at 12:03

## 2025-03-28 RX ADMIN — ACETAMINOPHEN 1000 MG: 500 TABLET ORAL at 08:03

## 2025-03-28 RX ADMIN — DEXAMETHASONE SODIUM PHOSPHATE 8 MG: 4 INJECTION INTRA-ARTICULAR; INTRALESIONAL; INTRAMUSCULAR; INTRAVENOUS; SOFT TISSUE at 10:03

## 2025-03-28 RX ADMIN — LIDOCAINE HYDROCHLORIDE 100 MG: 20 INJECTION, SOLUTION INTRAVENOUS at 10:03

## 2025-03-28 RX ADMIN — MIDAZOLAM HYDROCHLORIDE 2 MG: 1 INJECTION, SOLUTION INTRAMUSCULAR; INTRAVENOUS at 10:03

## 2025-03-28 RX ADMIN — ROCURONIUM BROMIDE 50 MG: 10 INJECTION INTRAVENOUS at 10:03

## 2025-03-28 RX ADMIN — DEXMEDETOMIDINE 4 MCG: 200 INJECTION, SOLUTION INTRAVENOUS at 10:03

## 2025-03-28 RX ADMIN — FAMOTIDINE 20 MG: 20 TABLET, FILM COATED ORAL at 08:03

## 2025-03-28 NOTE — INTERVAL H&P NOTE
Rogelio Davila is 64 y.o.  presenting for scheduled RA-TLH/BSO.    Temp:  [98.5 °F (36.9 °C)] 98.5 °F (36.9 °C)  Pulse:  [63] 63  Resp:  [18] 18  SpO2:  [99 %] 99 %  BP: (151)/(89) 151/89    General: NAD, alert, oriented, cooperative  HEENT: NCAT, EOM grossly intact  Lungs: Normal WOB  Heart: regular rate  Abdomen: nondistended    Consents in chart. Pre-operative heparin given at 0843 . All questions answered and concerns addressed. To OR for planned procedure.       Lisa Crawford MD  PGY-4 OB/GYN

## 2025-03-28 NOTE — OPERATIVE NOTE ADDENDUM
Certification of Assistant at Surgery       Surgery Date: 3/28/2025     Participating Surgeons:  Surgeons and Role:     * Savannah Kramer MD - Primary     * Lisa Crawford MD - Resident - Assisting     * Chetna Galeana MD - Resident - Observing    Procedures:  Procedure(s) (LRB):  XI ROBOTIC HYSTERECTOMY,WITH SALPINGO-OOPHORECTOMY (Bilateral)    Assistant Surgeon's Certification of Necessity:  I understand that section 1842 (b) (6) (d) of the Social Security Act generally prohibits Medicare Part B reasonable charge payment for the services of assistants at surgery in teaching hospitals when qualified residents are available to furnish such services. I certify that the services for which payment is claimed were medically necessary, and that no qualified resident was available to perform the services. I further understand that these services are subject to post-payment review by the Medicare carrier.      SHONDA Bean    03/28/2025  12:35 PM

## 2025-03-28 NOTE — DISCHARGE SUMMARY
Discharge Summary  Gynecology      Admit Date: 3/28/2025    Discharge Date and Time: 3/28/2025    Attending Physician: Savannah Kramer MD    Principal Diagnoses:   History of robot-assisted laparoscopic hysterectomy    Active Hospital Problems    Diagnosis  POA    *S/p RA-TLH/BSO [Z90.710]  Not Applicable      Resolved Hospital Problems   No resolved problems to display.       Procedures:   Procedure(s) (LRB):  XI ROBOTIC HYSTERECTOMY,WITH SALPINGO-OOPHORECTOMY (Bilateral)    Discharged Condition: good    Hospital Course:   Rogelio Davila is a 64 y.o. y.o.  female who presented on 3/28/2025 for the above-listed procedures for the treatment of endometrial hyperplasia without atypia. Patient tolerated the procedure well and was admitted for post-operative care. Post-operative course was uncomplicated.    On day of discharge (POD#0), patient was in stable condition, having met all post-operative milestones. She was urinating spontaneously, ambulating, and tolerating a regular diet without nausea/vomiting. Pain was well-controlled on oral medication. She was discharged with medications and follow up as listed below.     Consults: None    Significant Diagnostic Studies:  Recent Labs   Lab 25  1038   WBC 6.82   HGB 13.4   HCT 40.6   MCV 93           Treatments:   1. Surgery as above    Disposition: Home or Self Care    Patient Instructions:   Current Discharge Medication List        START taking these medications    Details   oxyCODONE (ROXICODONE) 5 MG immediate release tablet Take 1 tablet (5 mg total) by mouth every 4 (four) hours as needed for Pain.  Qty: 10 tablet, Refills: 0    Comments: Quantity prescribed more than 7 day supply? No  Associated Diagnoses: History of robot-assisted laparoscopic hysterectomy           CONTINUE these medications which have CHANGED    Details   acetaminophen (TYLENOL) 650 MG TbSR Take 1 tablet (650 mg total) by mouth every 6 (six) hours. Alternate between  ibuprofen and tylenol every 3 hours. For example: @0800: ibuprofen 600mg @1100: tylenol 650mg @1400: ibuprofen 600mg @1700: tylenol 650 mg @2000: ibuprofen 600mg  Qty: 60 tablet, Refills: 1    Associated Diagnoses: History of robot-assisted laparoscopic hysterectomy      docusate sodium (COLACE) 100 MG capsule Take 1 capsule (100 mg total) by mouth 2 (two) times daily.  Qty: 60 capsule, Refills: 1    Associated Diagnoses: History of robot-assisted laparoscopic hysterectomy      ibuprofen (ADVIL,MOTRIN) 600 MG tablet Take 1 tablet (600 mg total) by mouth every 6 (six) hours. Alternate between ibuprofen and tylenol every 3 hours. For example: @0800: ibuprofen 600mg @1100: tylenol 650mg @1400: ibuprofen 600mg @1700: tylenol 650 mg @2000: ibuprofen 600mg  Qty: 60 tablet, Refills: 1    Associated Diagnoses: History of robot-assisted laparoscopic hysterectomy           CONTINUE these medications which have NOT CHANGED    Details   calcium carbonate (OS-HECTOR) 600 mg calcium (1,500 mg) Tab Take 600 mg by mouth once.      cetirizine (ZYRTEC) 10 MG tablet Take 10 mg by mouth as needed.      estradioL (ESTRACE) 0.01 % (0.1 mg/gram) vaginal cream Place 1 g vaginally 3 (three) times a week.  Qty: 42 g, Refills: 3    Associated Diagnoses: Postmenopausal atrophic vaginitis      estradiol 0.05 mg/24 hr td ptsw (VIVELLE-DOT) 0.05 mg/24 hr Place 1 patch onto the skin twice a week.  Qty: 24 patch, Refills: 0    Associated Diagnoses: Menopausal syndrome      hydrochlorothiazide (HYDRODIURIL) 25 MG tablet Take 25 mg by mouth once daily.       metoprolol tartrate (LOPRESSOR) 25 MG tablet Take 1 tablet by mouth 2 (two) times daily.      MULTIVITAMIN ORAL Take by mouth.           STOP taking these medications       progesterone (PROMETRIUM) 200 MG capsule Comments:   Reason for Stopping:         meloxicam (MOBIC) 15 MG tablet Comments:   Reason for Stopping:               Discharge Procedure Orders   Diet general     Lifting restrictions    Order Comments: No lifting greater than 15 pounds for six weeks.     Other restrictions (specify):   Order Comments: PELVIC REST:  No douching, tampons, or intercourse for 6 weeks.    If prescribed, vaginal estrogen cream may be used during the postoperative period.     DRIVING:  No driving while on narcotics. Driving may be resumed initially with a competent passenger one to two weeks after surgery if no longer taking narcotics.     EXERCISE:  For six weeks your exercise should be limited to walking. You may walk as far as you wish, as long as you increase your level of exertion gradually and avoid slippery surfaces. You may climb stairs as needed to get around, but should not use stair climbing for exercise.     Remove dressing in 24 hours   Order Comments: If you have a bandage on wound, you may remove it the day after dismissal.  If you had steri-strips remove them once they begin to peel off (usually 2 weeks).  If your steri-strips still haven't come off in 2 weeks, please remove them.  Keep incision clean and dry.  Inspect the incision daily for signs and symptoms of infection.     Wound care routine (specify)   Order Comments: WOUND CARE:  If you have a band-aid or bandage on your wound, you may remove it the day after dismissal.  You may wash the wound with mild soap and water.   You may shower at any time but should avoid immersing any abdominal incisions in water for at least two weeks after surgery or until the wound is completely healed. If given, please shower with Hibiclens soap until bottle is completely finished. Keep your wound clean and dry.  You should observe your incision for signs of infection which include redness, warmth, drainage or fever.     Call MD for:  temperature >100.4     Call MD for:  persistent nausea and vomiting     Call MD for:  severe uncontrolled pain     Call MD for:  difficulty breathing, headache or visual disturbances     Call MD for:  redness, tenderness, or signs of  infection (pain, swelling, redness, odor or green/yellow discharge around incision site)     Call MD for:  hives     Call MD for:   Order Comments: inability to void, urine is ketchup colored or you have large clots, vaginal bleeding is heavier than a period.    VAGINAL DISCHARGE: You may develop a vaginal discharge and intermittent vaginal spotting after surgery and up to 6 weeks postoperatively.  The discharge may have an odor and may change in color but it is normal.  This is due to dissolving stiches.  Contact your surgical team if you develop vaginal or vulvar irritation along with a discharge.  Also contact your surgical team if you have vaginal discharge that smells like urine or stool.    CONSTIPATION REMEDIES: Patients are often constipated after surgery or with use of oral narcotic medicine. You should continue to take the stool softener, Senokot-S during the next six weeks, and consume adequate amounts of water.  If you have not had a bowel movement for 3 days after dismissal, or are uncomfortable and unable to pass stool, please try one or all of the following measures:  1.  Milk of Magnesia - 30 cc by mouth every 12 hours   2.  Dulcolax suppository - One suppository per rectum every 4-6 hours   3.  Metamucil, Fibercon or other bulk former - use as directed  4.  Fleets Enema      PAIN MEDICATIONS:     Take your pain medications as instructed. It is best to take pain medications before your pain becomes severe. This will allow you to take less medication yet have better pain relief. For the first 2 or 3 days it may be helpful to take your pain medications on a regular schedule (e.g. every 4 to 6 hours). This will help you to keep your pain under better control. You should then begin to take fewer medications each day until you no longer need them. Do not take pain medication on an empty stomach. This may lead to nausea and vomiting.     Activity as tolerated   Order Comments: PELVIC REST:  No douching,  tampons, or intercourse for 6 weeks.    If prescribed, vaginal estrogen cream may be used during the postoperative period.     DRIVING:  No driving while on narcotics. Driving may be resumed initially with a competent passenger one to two weeks after surgery if no longer taking narcotics.     EXERCISE:  For six weeks your exercise should be limited to walking. You may walk as far as you wish, as long as you increase your level of exertion gradually and avoid slippery surfaces. You may climb stairs as needed to get around, but should not use stair climbing for exercise.     Shower on day dressing removed (No bath)   Order Comments: You may shower at any time but should avoid immersing any abdominal incisions in water for at least 2 weeks after surgery or until the wound is completely healed.  If given, please shower with Hibaclens soap until bottle is completely finish        Follow-up Information       Savannah Kramer MD Follow up on 4/16/2025.    Specialty: Gynecologic Oncology  Why: Post-op follow up appointment  Contact information:  [address] [facility] [address]  [phone]                           Chetna Galeana MD  OB/GYN PGY-1

## 2025-03-28 NOTE — ANESTHESIA POSTPROCEDURE EVALUATION
Anesthesia Post Evaluation    Patient: Rogelio Davila    Procedure(s) Performed: Procedure(s) (LRB):  XI ROBOTIC HYSTERECTOMY,WITH SALPINGO-OOPHORECTOMY (Bilateral)    Final Anesthesia Type: general      Patient location during evaluation: PACU  Patient participation: Yes- Able to Participate  Level of consciousness: awake and alert  Post-procedure vital signs: reviewed and stable  Pain management: adequate  Airway patency: patent  MARIAELENA mitigation strategies: Extubation while patient is awake  PONV status at discharge: No PONV  Anesthetic complications: no      Cardiovascular status: hemodynamically stable  Respiratory status: unassisted  Hydration status: euvolemic  Follow-up not needed.              Vitals Value Taken Time   /83 03/28/25 13:19   Temp 36.5 °C (97.7 °F) 03/28/25 12:35   Pulse 54 03/28/25 13:29   Resp 18 03/28/25 13:15   SpO2 91 % 03/28/25 13:29   Vitals shown include unfiled device data.      No case tracking events are documented in the log.      Pain/Rober Score: Pain Rating Prior to Med Admin: 7 (3/28/2025  1:25 PM)  Pain Rating Post Med Admin: 7 (3/28/2025  1:05 PM)  Rober Score: 9 (3/28/2025  1:05 PM)

## 2025-03-28 NOTE — OP NOTE
[facility]  General Surgery  Operative Note    SUMMARY     Date of Procedure: 3/28/2025     Procedure: Procedure(s) (LRB):  XI ROBOTIC HYSTERECTOMY,WITH SALPINGO-OOPHORECTOMY (Bilateral)       Surgeons and Role:     * Savannah Kramer MD - Primary     * Lisa Crawford MD - Resident - Assisting     * Chetna Galeana MD - Resident - Observing        Pre-Operative Diagnosis: Complex endometrial hyperplasia without atypia [N85.01]  Status post hysteroscopy [Z98.890]  PMB (postmenopausal bleeding) [N95.0]  Thickened endometrium [R93.89]    Post-Operative Diagnosis: Post-Op Diagnosis Codes:     * Complex endometrial hyperplasia without atypia [N85.01]     * Status post hysteroscopy [Z98.890]     * PMB (postmenopausal bleeding) [N95.0]     * Thickened endometrium [R93.89]    Anesthesia: General    Operative Findings (including complications, if any): Normal uterus and bilateral adnexa.     Description of Technical Procedures: The patient was taken to the Operating Room. Informed consent had been obtained.  She underwent general endotracheal anesthesia without difficulty, was prepped and draped in the normal sterile fashion in a dorsal lithotomy position.  Timeout was performed.  All parties agreed to the planned procedure. Perioperative antibiotics were administered.  Brenner catheter was placed under sterile conditions. The VCare uterine manipulator was then secured in place in a standard fashion for uterine manipulation. Gloves were changed and attention was turned to the patient's abdomen.       Veress needle was gently inserted in the umbilicus. Intra-abdominal placement was confirmed with low CO2 pressure and water drop test.  Abdomen was insufflated and pneumoperitoneum was obtained.  Skin incision was made superior to the umbilicus. Robotic trocar was introduced.  Intra-abdominal placement was confirmed.  Additional trocars were placed, 2 robotic trocars to the left of the camera, 1 robotic  trocar to the right of the camera and an additional 8 mm assist port to the right of the camera.  The patient was placed in steep Trendelenburg. Robot was docked and operating surgeon reported to the console.       Survey of the abdomen and pelvis revealed the above findings. Next bilateral round ligaments were identified.  These were cauterized and transected. The posterior leaf of the broad ligament was then opened bilaterally facilitating access to the retroperitoneum. The infundibulopelvic ligaments were then skeletonized with good visualization of the ureter beneath. These were cauterized and transected.  The anterior leaf of the broad ligament was then opened circumferentially.  Proper plane for the bladder flap was identified and the bladder was gently reflected off of the anterior aspect of the uterus and cervix to the level of the cervicovaginal junction. Bilateral uterine arteries were then skeletonized.  These were cauterized and transected.  The remainder of the uterosacral and cardinal ligaments were then also serially cauterized and transected.  Posterior colpotomy was initiated and carried around circumferentially.  The uterus, cervix, bilateral fallopian tubes and ovaries were then removed through the vagina.        We then closed the vaginal cuff with a V-Loc suture in a running fashion. Bilateral ureters were reinspected and both noted to be vermiculating equally and briskly. Pelvis was hemostatic. A final survey was done of the abdomen and pelvis. There was no active bleeding and the integrity of the bowel, bladder, and other visible organs and tissue was again confirmed. The robotic trocars were then removed under direct visualization and the robot was undocked. Skin incisions were then rendered hemostatic.  These were closed with 4-0 Monocryl in a subcuticular fashion. The patient tolerated the procedure well. Sponge, lap, needle and instrument counts were correct x2 as reported by the  circulating nurse.  She was awakened from anesthesia and taken to recovery in stable condition.      Significant Surgical Tasks Conducted by the Assistant(s), if Applicable: Angela Martínez NP- performed expert bedside robotic assist. I certify that the services for which payment is claimed were medically necessary, and that no qualified resident was available to perform the services.     Estimated Blood Loss (EBL): <20cc    Specimens:   Uterus, cervix, bilateral fallopian tubes and ovaries              Condition: Good    Disposition: PACU - hemodynamically stable.    Attestation: I was present and scrubbed for the entire procedure.

## 2025-03-28 NOTE — ANESTHESIA PROCEDURE NOTES
Intubation    Date/Time: 3/28/2025 10:21 AM    Performed by: Elma Adorno CRNA  Authorized by: Adonis Main MD    Intubation:     Induction:  Intravenous    Intubated:  Postinduction    Mask Ventilation:  Easy with oral airway    Attempts:  1    Attempted By:  CRNA    Method of Intubation:  Video laryngoscopy    Blade:  [place] 3    Laryngeal View Grade: Grade IIA - cords partially seen      Difficult Airway Encountered?: No      Complications:  None    Airway Device:  Oral endotracheal tube    Airway Device Size:  7.5    Style/Cuff Inflation:  Cuffed (inflated to minimal occlusive pressure)    Inflation Amount (mL):  4    Tube secured:  21    Secured at:  The lips    Placement Verified By:  Capnometry    Complicating Factors:  None    Findings Post-Intubation:  BS equal bilateral and atraumatic/condition of teeth unchanged

## 2025-03-28 NOTE — TRANSFER OF CARE
"Anesthesia Transfer of Care Note    Patient: Rogelio Davila    Procedure(s) Performed: Procedure(s) (LRB):  XI ROBOTIC HYSTERECTOMY,WITH SALPINGO-OOPHORECTOMY (Bilateral)    Patient location: PACU    Anesthesia Type: general    Transport from OR: Transported from OR on 6-10 L/min O2 by face mask with adequate spontaneous ventilation    Post pain: adequate analgesia    Post assessment: no apparent anesthetic complications and tolerated procedure well    Post vital signs: stable    Level of consciousness: awake and alert    Nausea/Vomiting: no nausea/vomiting    Complications: none    Transfer of care protocol was followed    Last vitals: Visit Vitals  BP (!) 151/89 (BP Location: Right arm, Patient Position: Sitting)   Pulse 63   Temp 36.9 °C (98.5 °F) (Oral)   Resp 18   Ht 5' 2" (1.575 m)   Wt 97.5 kg (215 lb)   LMP 11/24/2010 (Approximate)   SpO2 99%   Breastfeeding No   BMI 39.32 kg/m²     "

## 2025-04-02 LAB
ESTROGEN SERPL-MCNC: NORMAL PG/ML
INSULIN SERPL-ACNC: NORMAL U[IU]/ML
LAB AP CLINICAL INFORMATION: NORMAL
LAB AP GROSS DESCRIPTION: NORMAL
LAB AP PERFORMING LOCATION(S): NORMAL
LAB AP REPORT FOOTNOTES: NORMAL
T3RU NFR SERPL: NORMAL %

## 2025-04-04 ENCOUNTER — TELEPHONE (OUTPATIENT)
Dept: GYNECOLOGIC ONCOLOGY | Facility: CLINIC | Age: 65
End: 2025-04-04
Payer: COMMERCIAL

## 2025-04-04 ENCOUNTER — RESULTS FOLLOW-UP (OUTPATIENT)
Dept: GYNECOLOGIC ONCOLOGY | Facility: CLINIC | Age: 65
End: 2025-04-04

## 2025-04-04 NOTE — TELEPHONE ENCOUNTER
Called with benign pathology. Doing well post op and has no complaints. Post op with Dr Kramer 4/16  Vanessa Jerome, FNP-C, AOP  Gynecologic Oncology    ----- Message from Savannah Kramer MD sent at 4/3/2025  1:08 PM CDT -----  Please make benign post op call.   ----- Message -----  From: Lab, Background User  Sent: 4/2/2025   5:40 PM CDT  To: Savannah Kramer MD

## 2025-04-15 NOTE — PROGRESS NOTES
Subjective     Patient ID: Rogelio Davila is a 64 y.o. female.    Chief Complaint: Post-op Evaluation    HPI    Presents today for post operative evaluation, s/p ALTAF BSO on 3/28/2025 for complex hyperplasia without atypia.    Final pathology benign.    She is recovering appropriately from surgery. Up and about, eating. + BM    History:  64 yr old para 2 referred from Dr Brewster for complex hyperplasia without atypia. Workup for VB she experienced in November when she ran out of her estradiol patches.     12/2024 pelvic US  Uterus 9.0 x 3.2 x 5.3 cm, ES 8 mm.  R ov 3.4 x 2.8 x 3.0 cm, Anechoic structure measuring 1.8 x 1.5 x 1.4 cm, likely representing a small cyst.  L ov 2.2 x 1.5 x 1.6 cm  No FF     1/2025 taken to OR for hysteroscopy, D&C  Complex hyperplasia without atypia arising in a polyp     LMP age 50 on HRT     HTN. BMI 39     Prior c section x 2.     Family history for pat aunt - breast cancer. No gyn/colon cancer     Review of Systems   Constitutional:  Negative for appetite change, chills, diaphoresis, fatigue, fever and unexpected weight change.   Respiratory:  Negative for chest tightness, shortness of breath and wheezing.    Cardiovascular:  Negative for chest pain, palpitations and leg swelling.   Gastrointestinal:  Negative for abdominal pain, constipation, diarrhea, nausea and vomiting.   Genitourinary:  Negative for difficulty urinating, dysuria, flank pain, frequency, hematuria, pelvic pain, urgency, vaginal bleeding, vaginal discharge and vaginal pain.   Musculoskeletal:  Negative for back pain.   Integumentary:  Negative for color change.   Neurological:  Negative for dizziness, light-headedness, numbness and headaches.   Psychiatric/Behavioral:  Negative for agitation. The patient is not nervous/anxious.      BP (!) 184/87   Pulse 70   Wt 96.2 kg (212 lb)   LMP 11/24/2010 (Approximate)   BMI 38.78 kg/m²        Objective     Physical Exam  Vitals and nursing note reviewed.    Constitutional:       General: She is not in acute distress.     Appearance: Normal appearance. She is well-developed. She is not diaphoretic.   HENT:      Head: Normocephalic and atraumatic.   Eyes:      General: No scleral icterus.  Pulmonary:      Effort: Pulmonary effort is normal. No respiratory distress.   Abdominal:      General: There is no distension.      Palpations: Abdomen is soft.      Comments: 5 robotic trocar sites C/D/I   Musculoskeletal:         General: Normal range of motion.      Cervical back: Normal range of motion.      Right lower leg: No edema.      Left lower leg: No edema.   Skin:     General: Skin is warm and dry.      Coloration: Skin is not pale.      Findings: No rash.   Neurological:      Mental Status: She is alert and oriented to person, place, and time.   Psychiatric:         Mood and Affect: Mood normal.         Behavior: Behavior normal.            Assessment and Plan     1. Complex endometrial hyperplasia without atypia    2. S/p RA-TLH/BSO        Healing appropriately from surgery  Fortunately benign pathology. Reviewed and copy of report provided.  Gradual increase of physical activity  May resume baths.  RTC in 4 weeks for vaginal cuff check           No follow-ups on file.

## 2025-04-16 ENCOUNTER — OFFICE VISIT (OUTPATIENT)
Dept: GYNECOLOGIC ONCOLOGY | Facility: CLINIC | Age: 65
End: 2025-04-16
Payer: COMMERCIAL

## 2025-04-16 VITALS
BODY MASS INDEX: 38.78 KG/M2 | SYSTOLIC BLOOD PRESSURE: 184 MMHG | HEART RATE: 70 BPM | DIASTOLIC BLOOD PRESSURE: 87 MMHG | WEIGHT: 212 LBS

## 2025-04-16 DIAGNOSIS — Z90.710 HISTORY OF ROBOT-ASSISTED LAPAROSCOPIC HYSTERECTOMY: ICD-10-CM

## 2025-04-16 DIAGNOSIS — N85.01 COMPLEX ENDOMETRIAL HYPERPLASIA WITHOUT ATYPIA: Primary | ICD-10-CM

## 2025-04-16 PROCEDURE — 99999 PR PBB SHADOW E&M-EST. PATIENT-LVL III: CPT | Mod: PBBFAC,,, | Performed by: OBSTETRICS & GYNECOLOGY

## 2025-04-16 PROCEDURE — 3077F SYST BP >= 140 MM HG: CPT | Mod: CPTII,S$GLB,, | Performed by: OBSTETRICS & GYNECOLOGY

## 2025-04-16 PROCEDURE — 1159F MED LIST DOCD IN RCRD: CPT | Mod: CPTII,S$GLB,, | Performed by: OBSTETRICS & GYNECOLOGY

## 2025-04-16 PROCEDURE — 99024 POSTOP FOLLOW-UP VISIT: CPT | Mod: S$GLB,,, | Performed by: OBSTETRICS & GYNECOLOGY

## 2025-04-16 PROCEDURE — 3288F FALL RISK ASSESSMENT DOCD: CPT | Mod: CPTII,S$GLB,, | Performed by: OBSTETRICS & GYNECOLOGY

## 2025-04-16 PROCEDURE — 1101F PT FALLS ASSESS-DOCD LE1/YR: CPT | Mod: CPTII,S$GLB,, | Performed by: OBSTETRICS & GYNECOLOGY

## 2025-04-16 PROCEDURE — 3079F DIAST BP 80-89 MM HG: CPT | Mod: CPTII,S$GLB,, | Performed by: OBSTETRICS & GYNECOLOGY

## 2025-04-16 PROCEDURE — 1125F AMNT PAIN NOTED PAIN PRSNT: CPT | Mod: CPTII,S$GLB,, | Performed by: OBSTETRICS & GYNECOLOGY

## 2025-05-07 ENCOUNTER — PATIENT MESSAGE (OUTPATIENT)
Dept: GYNECOLOGIC ONCOLOGY | Facility: CLINIC | Age: 65
End: 2025-05-07
Payer: COMMERCIAL

## 2025-05-07 ENCOUNTER — TELEPHONE (OUTPATIENT)
Dept: GYNECOLOGIC ONCOLOGY | Facility: CLINIC | Age: 65
End: 2025-05-07
Payer: COMMERCIAL

## 2025-05-14 ENCOUNTER — OFFICE VISIT (OUTPATIENT)
Dept: GYNECOLOGIC ONCOLOGY | Facility: CLINIC | Age: 65
End: 2025-05-14
Payer: COMMERCIAL

## 2025-05-14 VITALS
TEMPERATURE: 98 F | HEART RATE: 70 BPM | SYSTOLIC BLOOD PRESSURE: 172 MMHG | BODY MASS INDEX: 38.64 KG/M2 | OXYGEN SATURATION: 100 % | DIASTOLIC BLOOD PRESSURE: 102 MMHG | HEIGHT: 62 IN | WEIGHT: 210 LBS | RESPIRATION RATE: 19 BRPM

## 2025-05-14 DIAGNOSIS — Z90.710 HISTORY OF ROBOT-ASSISTED LAPAROSCOPIC HYSTERECTOMY: Primary | ICD-10-CM

## 2025-05-14 DIAGNOSIS — N85.01 COMPLEX ENDOMETRIAL HYPERPLASIA WITHOUT ATYPIA: ICD-10-CM

## 2025-05-14 PROCEDURE — 3077F SYST BP >= 140 MM HG: CPT | Mod: CPTII,S$GLB,, | Performed by: OBSTETRICS & GYNECOLOGY

## 2025-05-14 PROCEDURE — 99999 PR PBB SHADOW E&M-EST. PATIENT-LVL IV: CPT | Mod: PBBFAC,,, | Performed by: OBSTETRICS & GYNECOLOGY

## 2025-05-14 PROCEDURE — 1101F PT FALLS ASSESS-DOCD LE1/YR: CPT | Mod: CPTII,S$GLB,, | Performed by: OBSTETRICS & GYNECOLOGY

## 2025-05-14 PROCEDURE — 3288F FALL RISK ASSESSMENT DOCD: CPT | Mod: CPTII,S$GLB,, | Performed by: OBSTETRICS & GYNECOLOGY

## 2025-05-14 PROCEDURE — 1126F AMNT PAIN NOTED NONE PRSNT: CPT | Mod: CPTII,S$GLB,, | Performed by: OBSTETRICS & GYNECOLOGY

## 2025-05-14 PROCEDURE — 99024 POSTOP FOLLOW-UP VISIT: CPT | Mod: S$GLB,,, | Performed by: OBSTETRICS & GYNECOLOGY

## 2025-05-14 PROCEDURE — 1159F MED LIST DOCD IN RCRD: CPT | Mod: CPTII,S$GLB,, | Performed by: OBSTETRICS & GYNECOLOGY

## 2025-05-14 PROCEDURE — 3080F DIAST BP >= 90 MM HG: CPT | Mod: CPTII,S$GLB,, | Performed by: OBSTETRICS & GYNECOLOGY

## 2025-05-14 NOTE — PROGRESS NOTES
"Subjective     Patient ID: Rogelio Davila is a 64 y.o. female.    Chief Complaint: Post-op Evaluation (6 week post op )    HPI    Presents today for follow up post operative evaluation, s/p ALTAF BSO on 3/28/2025 for complex hyperplasia without atypia.    Final pathology benign.    She continues to recover appropriately from surgery.     ___________________________  History:  64 yr old para 2 referred from Dr Brewster for complex hyperplasia without atypia. Workup for VB she experienced in November when she ran out of her estradiol patches.     12/2024 pelvic US  Uterus 9.0 x 3.2 x 5.3 cm, ES 8 mm.  R ov 3.4 x 2.8 x 3.0 cm, Anechoic structure measuring 1.8 x 1.5 x 1.4 cm, likely representing a small cyst.  L ov 2.2 x 1.5 x 1.6 cm  No FF     1/2025 taken to OR for hysteroscopy, D&C  Complex hyperplasia without atypia arising in a polyp     LMP age 50 on HRT     HTN. BMI 39     Prior c section x 2.     Family history for pat aunt - breast cancer. No gyn/colon cancer     Review of Systems   Constitutional:  Negative for appetite change, chills, diaphoresis, fatigue, fever and unexpected weight change.   Respiratory:  Negative for chest tightness, shortness of breath and wheezing.    Cardiovascular:  Negative for chest pain, palpitations and leg swelling.   Gastrointestinal:  Negative for abdominal pain, constipation, diarrhea, nausea and vomiting.   Genitourinary:  Negative for difficulty urinating, dysuria, flank pain, frequency, hematuria, pelvic pain, urgency, vaginal bleeding, vaginal discharge and vaginal pain.   Musculoskeletal:  Negative for back pain.   Integumentary:  Negative for color change.   Neurological:  Negative for dizziness, light-headedness, numbness and headaches.   Psychiatric/Behavioral:  Negative for agitation. The patient is not nervous/anxious.      BP (!) 172/102 (BP Location: Left arm, Patient Position: Sitting)   Pulse 70   Temp 98.3 °F (36.8 °C) (Oral)   Resp 19   Ht 5' 2" (1.575 m)   " Wt 95.3 kg (210 lb)   LMP 11/24/2010 (Approximate)   SpO2 100%   BMI 38.41 kg/m²        Objective     Physical Exam  Vitals and nursing note reviewed. Exam conducted with a chaperone present.   Constitutional:       General: She is not in acute distress.     Appearance: Normal appearance. She is well-developed. She is not diaphoretic.   HENT:      Head: Normocephalic and atraumatic.   Eyes:      General: No scleral icterus.  Pulmonary:      Effort: Pulmonary effort is normal. No respiratory distress.   Abdominal:      General: There is no distension.      Palpations: Abdomen is soft.      Comments: 5 robotic trocar sites C/D/I   Genitourinary:     Vagina: Normal.      Uterus: Absent.       Adnexa:         Right: No tenderness.          Left: No tenderness.        Comments: Vaginal cuff healing appropriately.  A female staff member was present to chaperone during the pelvic exam.    Musculoskeletal:         General: Normal range of motion.      Cervical back: Normal range of motion.      Right lower leg: No edema.      Left lower leg: No edema.   Skin:     General: Skin is warm and dry.      Coloration: Skin is not pale.      Findings: No rash.   Neurological:      Mental Status: She is alert and oriented to person, place, and time.   Psychiatric:         Mood and Affect: Mood normal.         Behavior: Behavior normal.            Assessment and Plan     1. S/p RA-TLH/BSO    2. Complex endometrial hyperplasia without atypia      Recovered appropriately from surgery  Fortunately benign pathology.   Will return care to primary providers.  May RTC with me as needed.          No follow-ups on file.

## 2025-05-26 ENCOUNTER — TELEPHONE (OUTPATIENT)
Dept: OBSTETRICS AND GYNECOLOGY | Facility: CLINIC | Age: 65
End: 2025-05-26
Payer: COMMERCIAL

## 2025-05-26 DIAGNOSIS — R39.9 UTI SYMPTOMS: Primary | ICD-10-CM

## 2025-05-26 NOTE — TELEPHONE ENCOUNTER
Called patient to assist with properly scheduling hormone foolow up visit. Also ordered UA&UC for UTI symptoms.

## 2025-05-28 DIAGNOSIS — N95.1 MENOPAUSAL SYNDROME: ICD-10-CM

## 2025-05-28 RX ORDER — ESTRADIOL 0.05 MG/D
FILM, EXTENDED RELEASE TRANSDERMAL
Qty: 24 PATCH | Refills: 0 | Status: SHIPPED | OUTPATIENT
Start: 2025-05-28

## 2025-06-26 DIAGNOSIS — Z78.0 MENOPAUSE: ICD-10-CM

## 2025-06-26 NOTE — TELEPHONE ENCOUNTER
Refill Routing Note   Medication(s) are not appropriate for processing by [facility] for the following reason(s):        No active prescription written by provider    [facility] action(s):  Defer        Medication Therapy Plan: Discontinued by: Chetna Galeana MD on 3/28/2025, stop taking at discharge      Appointments  past 12m or future 3m with PCP    Date Provider   Last Visit   1/14/2025 Rachell Brewster MD   Next Visit   7/29/2025 Rachell Brewster MD   ED visits in past 90 days: 0        Note composed:10:47 AM 06/26/2025

## 2025-06-27 RX ORDER — PROGESTERONE 200 MG/1
200 CAPSULE ORAL NIGHTLY
Qty: 90 CAPSULE | Refills: 0 | OUTPATIENT
Start: 2025-06-27

## 2025-07-29 ENCOUNTER — OFFICE VISIT (OUTPATIENT)
Dept: OBSTETRICS AND GYNECOLOGY | Facility: CLINIC | Age: 65
End: 2025-07-29
Attending: OBSTETRICS & GYNECOLOGY
Payer: COMMERCIAL

## 2025-07-29 ENCOUNTER — TELEPHONE (OUTPATIENT)
Dept: OBSTETRICS AND GYNECOLOGY | Facility: CLINIC | Age: 65
End: 2025-07-29

## 2025-07-29 VITALS
DIASTOLIC BLOOD PRESSURE: 80 MMHG | BODY MASS INDEX: 39.2 KG/M2 | HEIGHT: 62 IN | HEART RATE: 85 BPM | SYSTOLIC BLOOD PRESSURE: 131 MMHG | WEIGHT: 213 LBS

## 2025-07-29 DIAGNOSIS — Z90.710 HISTORY OF ROBOT-ASSISTED LAPAROSCOPIC HYSTERECTOMY: ICD-10-CM

## 2025-07-29 DIAGNOSIS — N95.1 MENOPAUSAL SYNDROME: Primary | ICD-10-CM

## 2025-07-29 DIAGNOSIS — Z78.0 MENOPAUSE PRESENT: ICD-10-CM

## 2025-07-29 PROCEDURE — 3075F SYST BP GE 130 - 139MM HG: CPT | Mod: CPTII,S$GLB,, | Performed by: OBSTETRICS & GYNECOLOGY

## 2025-07-29 PROCEDURE — 99999 PR PBB SHADOW E&M-EST. PATIENT-LVL III: CPT | Mod: PBBFAC,,, | Performed by: OBSTETRICS & GYNECOLOGY

## 2025-07-29 PROCEDURE — 3288F FALL RISK ASSESSMENT DOCD: CPT | Mod: CPTII,S$GLB,, | Performed by: OBSTETRICS & GYNECOLOGY

## 2025-07-29 PROCEDURE — 3008F BODY MASS INDEX DOCD: CPT | Mod: CPTII,S$GLB,, | Performed by: OBSTETRICS & GYNECOLOGY

## 2025-07-29 PROCEDURE — 3079F DIAST BP 80-89 MM HG: CPT | Mod: CPTII,S$GLB,, | Performed by: OBSTETRICS & GYNECOLOGY

## 2025-07-29 PROCEDURE — 1101F PT FALLS ASSESS-DOCD LE1/YR: CPT | Mod: CPTII,S$GLB,, | Performed by: OBSTETRICS & GYNECOLOGY

## 2025-07-29 PROCEDURE — 1126F AMNT PAIN NOTED NONE PRSNT: CPT | Mod: CPTII,S$GLB,, | Performed by: OBSTETRICS & GYNECOLOGY

## 2025-07-29 PROCEDURE — 1159F MED LIST DOCD IN RCRD: CPT | Mod: CPTII,S$GLB,, | Performed by: OBSTETRICS & GYNECOLOGY

## 2025-07-29 PROCEDURE — 99214 OFFICE O/P EST MOD 30 MIN: CPT | Mod: S$GLB,,, | Performed by: OBSTETRICS & GYNECOLOGY

## 2025-07-29 RX ORDER — OLMESARTAN MEDOXOMIL AND HYDROCHLOROTHIAZIDE 20/12.5 20; 12.5 MG/1; MG/1
1 TABLET ORAL
COMMUNITY
Start: 2025-06-19 | End: 2025-09-17

## 2025-07-29 RX ORDER — CALCIUM CARBONATE/VITAMIN D3 600MG-5MCG
60 TABLET ORAL
COMMUNITY

## 2025-07-29 RX ORDER — ESTRADIOL 0.1 MG/D
1 FILM, EXTENDED RELEASE TRANSDERMAL
Qty: 24 PATCH | Refills: 1 | Status: SHIPPED | OUTPATIENT
Start: 2025-07-31

## 2025-07-29 RX ORDER — ATORVASTATIN CALCIUM 40 MG/1
40 TABLET, FILM COATED ORAL
COMMUNITY
Start: 2025-06-20 | End: 2026-06-20

## 2025-07-29 RX ORDER — LEVOCETIRIZINE DIHYDROCHLORIDE 5 MG/1
5 TABLET, FILM COATED ORAL NIGHTLY
COMMUNITY
Start: 2025-07-25 | End: 2025-10-23

## 2025-07-29 RX ORDER — SARILUMAB 200 MG/1.14ML
INJECTION, SOLUTION SUBCUTANEOUS
COMMUNITY
Start: 2025-06-16

## 2025-07-29 RX ORDER — FLUTICASONE PROPIONATE 50 MCG
2 SPRAY, SUSPENSION (ML) NASAL
COMMUNITY

## 2025-07-29 RX ORDER — ESTRADIOL 0.1 MG/G
1 CREAM VAGINAL
Qty: 42 G | Refills: 3 | Status: SHIPPED | OUTPATIENT
Start: 2025-07-30

## 2025-07-29 NOTE — PROGRESS NOTES
Rogelio Davila is a 65 y.o. female who presents to discuss ongoing hormone replacement therapy.  Menarche occurred at age 11 and the patient went into menopause at 49 years of age, which was 16 years ago. Patient is requesting  changing hormone replacement therapy due to hot flashes, insomnia, and vaginal dryness, night sweats.The patient is taking hormone replacement therapy. The patient underwent TLHBSO March 2025 due to Complex ENdometrial hyperplasia.   She is currently on Vivelle Dot 0.05 , and has stopped her Progesterone. The patient is not sexually active.  She denies the following contraindications to HRT:  Vaginal bleeding, history of VTE/PE, thrombophilia,  breast cancer, or active liver disease.       Cristal Verduzco MD      Routine labs: 6-  Pap smear: 6/12/2023  Mammogram: 12-4-2024: BI-RADS Category 1: Negative     DEXA: 10-: Normal   Colonoscopy: 2025: Normal    No visits with results within 3 Month(s) from this visit.   Latest known visit with results is:   Admission on 03/28/2025, Discharged on 03/28/2025   Component Date Value Ref Range Status    POCT Glucose 03/28/2025 92  70 - 110 mg/dL Final    Case Report 03/28/2025    Final                    Value:[facility]                                Case: [id number]                                Authorizing Provider:  Savannah Kramer MD       Collected:           03/28/2025 11:46 AM          Ordering Location:     [facility]          Received:            03/28/2025 03:17 PM                                 ([place])                                                                   Pathologist:           Stephanie Agulia MD                                                          Specimen:    Uterus, Cervix, Bilateral Fallopian Tubes & Ovaries, uterus, cervix, bilateral tubes                and ovaries                                                                                Clinical Information 03/28/2025     Final                    Value:Procedure:  XI ROBOTIC HYSTERECTOMY,WITH SALPINGO-OOPHORECTOMY - Bilateral  Pre-op Diagnosis:  Complex endometrial hyperplasia without atypia [N85.01]  Status post hysteroscopy [Z98.890]  PMB (postmenopausal bleeding) [N95.0]  Thickened endometrium [R93.89]  Post-op Diagnosis:  N85.01 - Complex endometrial hyperplasia without atypia [ICD-10-CM]  Z98.890 - Status post hysteroscopy [ICD-10-CM]  N95.0 - PMB (postmenopausal bleeding) [ICD-10-CM]  R93.89 - Thickened endometrium [ICD-10-CM]      Final Diagnosis 03/28/2025    Final                    Value:UTERUS, CERVIX, AND BILATERAL FALLOPIAN TUBES AND OVARIES, TOTAL HYSTERECTOMY WITH BILATERAL SALPINGO-OOPHORECTOMY:    UTERUS:  - Complex endometrial hyperplasia with atypia.  - Leiomyoma, intramural, benign.  - Adenomyosis, involved by complex endometrial hyperplasia.  - Benign lower uterine segment with focal changes compatible with biopsy site changes.  - Benign bilateral parametrial shave margins.  - Negative for malignancy.  - The entire endometrium is submitted for histologic examination.    CERVIX:  - Benign cervix with reactive changes.  - No high-grade dysplasia or malignancy.    RIGHT FALLOPIAN TUBE AND OVARY:  - Benign ovary with Leydig-cell hyperplasia.  - Benign fallopian tube and fimbriated end.  - Negative for malignancy.    LEFT FALLOPIAN TUBE AND OVARY:  - Benign ovary with Leydig-cell hyperplasia.  - Benign fallopian tube and fimbriated end.  - Negative for malignancy.      Microscopic Description 03/28/2025    Final                    Value:A microscopic examination was performed and the diagnosis reflects the findings.          Gross Description 03/28/2025    Final                    Value:1. Uterus, Cervix, Bilateral Fallopian Tubes & Ovaries: uterus, cervix, bilateral tubes and ovaries  MRN # on Epic Label:  [id number]  MRN # on Pathology Label:  [id number]    SPECIMEN DESIGNATION: Received fresh and subsequently placed in  formalin designated as uterus, cervix, bilateral tubes and ovaries.    COMPONENTS: The specimen consists of a 100 g total hysterectomy (8.4 cm superior to inferior x 5.0 cm right to left x 3.5 cm anterior to posterior) with attached right fallopian tube (5.6 cm in length x 0.5 cm in average diameter), left fallopian tube (5.4 cm in length x 0.5 cm in average diameter), right ovary (2.5 x 2.1 x 1.5 cm, 3.3 g), and left ovary (2.6 x 1.7 x 1.5 cm, 2.9 g).     UTERINE SEROSA: The serosa is pink-tan, smooth and glistening.    CERVIX: The attached cervix (3.0 cm in diameter) has purple-gray ectocervical mucosa. The cervical os is round and measures approximately 0.9 cm in diameter. The endocervical canal (2.8 cm in length x 0.9 cm in average width) has                           pink-tan herringbone mucosa.     ENDOMETRIUM: The endometrial cavity (3.0 cm cornu to cornu x 4.0 cm in length) has red-pink endometrium, with an average thickness measuring 0.1-0.2 cm. No endometrial masses are identified.    MYOMETRIUM: The myometrium is pink-tan, mildly trabeculated with an average thickness measuring 1.6 cm.  No myometrial masses are identified.    FALLOPIAN TUBES: The bilateral fallopian tubes have a purple-gray glistening serosal surface. The fimbriated ends are unremarkable. Cross-sections reveal an unremarkable patent lumen (0.1 cm).    OVARIES: The bilateral ovaries have a tan-yellow, minimally bosselated external surface. Cross-sections reveal pink-tan parenchyma with prominent grossly identifiable corporal bodies.  No cysts or solid masses are present.    Ink code:   Anterior-blue   Posterior-black    CASSETTE SUMMARY: Representative sections are submitted as follows (endometrium entirely submitted):  1A and 1B: Right parametrial margin, shaved  1C:  Left                           parametrial margin, shaved   1D-1H:  Anterior endometrium, entirely submitted from fundus to lower uterine segment (1F-full thickness  "cross-section)  1I:  Anterior lower uterine segment  1J:  Anterior cervix  1K-1N:  Posterior endometrium, entirely submitted from fundus to lower uterine segment (1L-full thickness cross-section)  1O:  Posterior lower uterine segment  1P:  Posterior cervix  1Q: Right fallopian tube and ovary   1R: Left fallopian tube and ovary    Date Received/Opened:  2025  Date Grossed:  2025        Grossing was completed by Sue Avitia on 3/31/2025.        Report Footnotes 2025    Final                    Value:Unless the case is a "gross only" or additional testing only, the final diagnosis for each specimen is based on a microscopic examination of appropriate tissue sections.      Performing Location 2025    Final                    Value:Grossing performed at [facility], [address] [place] [address], [id number]    Sign out performed at [facility], [address] [place] [id number]           Past Medical History:   Diagnosis Date    Abnormal Pap smear of cervix     Hyperlipidemia     Hypertension     PMR (polymyalgia rheumatica)     newly diagnosis    SVT (supraventricular tachycardia)     only one episode-     Past Surgical History:   Procedure Laterality Date    CARPAL TUNNEL RELEASE       SECTION      x2    COLPOSCOPY      HYSTEROSCOPY WITH DILATION AND CURETTAGE OF UTERUS N/A 2025    Procedure: [id number];  Surgeon: Rachell Brewster MD;  Location: [facility];  Service: OB/GYN;  Laterality: N/A;    XI ROBOTIC HYSTERECTOMY, WITH SALPINGO-OOPHORECTOMY Bilateral 3/28/2025    Procedure: XI ROBOTIC HYSTERECTOMY,WITH SALPINGO-OOPHORECTOMY;  Surgeon: Savannah Kramer MD;  Location: [facility];  Service: Gynecology Oncology;  Laterality: Bilateral;  1.5 hr case     Social History[1]  Family History   Problem Relation Name Age of Onset    Cancer Maternal Grandfather          lung    Diabetes Maternal " "Grandfather      Stroke Father  62    Hypertension Father      Heart attacks under age 50 Father          smoker, drinker    Hypertension Mother      Breast cancer Paternal Aunt  58    Melanoma Neg Hx      Ovarian cancer Neg Hx      Colon cancer Neg Hx      Cervical cancer Neg Hx      Uterine cancer Neg Hx       OB History    Para Term  AB Living   2 2 2   2   SAB IAB Ectopic Multiple Live Births       2      # Outcome Date GA Lbr Andreas/2nd Weight Sex Type Anes PTL Lv   2 Term      CS-Unspec  N LIDA   1 Term      CS-Unspec  N LIDA     Current Medications[2]    Review of Systems:  General: No fever, chills, or weight loss.  Chest: No chest pain, shortness of breath, or palpitations.  Breast: No pain, masses, or nipple discharge.  Vulva: No pain, lesions, or itching.  Vagina: No relaxation, itching, discharge, or lesions.  Abdomen: No pain, nausea, vomiting, diarrhea, or constipation.  Urinary: No incontinence, nocturia, frequency, or dysuria.  Extremities:  No leg cramps, edema, or calf pain.  Neurologic: No headaches, dizziness, or visual changes.    Vitals:    25 1037   BP: 131/80   Pulse: 85   Weight: 96.6 kg (213 lb)   Height: 5' 2" (1.575 m)   PainSc: 0-No pain     Body mass index is 38.96 kg/m².    Assessment:    Menopausal syndrome  -     estradioL (VIVELLE-DOT) 0.1 mg/24 hr PTSW; Place 1 patch onto the skin twice a week.  Dispense: 24 patch; Refill: 1  -     estradioL (ESTRACE) 0.01 % (0.1 mg/gram) vaginal cream; Place 1 g vaginally 3 (three) times a week.  Dispense: 42 g; Refill: 3    S/p RA-TLH/BSO    Menopause present        Plan:   Risks and benefits of hormone replacement therapy were discussed.  Hormone replacement therapy options, including bioidentical versus non-bioidentical hormones, as well as alternatives discussed.  We spent a significant amt of time discussing estrogen replacement theropy.  We discussed the risks and benefits including breast cancer and embolic risk, osteoporosis " and heart and colon health benefits.  Pt understands that there are many different ways to take estrogen and many different doses and that she does not have to take long term unless she chooses.  She understands that if she still has her uterus, she will need to take progesterone with this to decrease risk of endometrial cancer.We discussed side effects that might include but not limited to headaches, edema, nausea.   We did discuss that transdermal option of estrogen is safer than oral estradiol as transdermal methods decrease risk for blood clots and stroke as they bypass liver metabolism.     I recommend 30 minutes 5 days a week of moderate intensity aerobics (fast walking, treadmill, biking, etc.) and weight-bearing exercise (free weights, machines, tension bands, etc.) at least twice weekly for overall health benefits and the prevention and/or treatment of osteoporosis.    PLAN:   Increase Vivelle to 0.1mg  Estradiol cream three times weekly    Follow up in 3 -6months  Will recheck labs once on typical optimal dose or if having side effects.  Instructed patient to call if she experiences any side effects or has any questions.  I spent 30 minutes with this patient today, >50% counseling.         [1]   Social History  Tobacco Use    Smoking status: Former     Current packs/day: 0.00     Types: Cigarettes     Quit date: 2013     Years since quittin.0    Smokeless tobacco: Former   Substance Use Topics    Alcohol use: Not Currently     Comment: occasionally    Drug use: No   [2]   Current Outpatient Medications:     atorvastatin (LIPITOR) 40 MG tablet, Take 40 mg by mouth., Disp: , Rfl:     KEVZARA 200 mg/1.14 mL PnIj, , Disp: , Rfl:     levocetirizine (XYZAL) 5 MG tablet, Take 5 mg by mouth every evening., Disp: , Rfl:     olmesartan-hydrochlorothiazide (BENICAR HCT) 20-12.5 mg per tablet, Take 1 tablet by mouth., Disp: , Rfl:     acetaminophen (TYLENOL) 650 MG TbSR, Take 1 tablet (650 mg total) by mouth  every 6 (six) hours. Alternate between ibuprofen and tylenol every 3 hours. For example: @0800: ibuprofen 600mg @1100: tylenol 650mg @1400: ibuprofen 600mg @1700: tylenol 650 mg @2000: ibuprofen 600mg, Disp: 60 tablet, Rfl: 1    calcium carbonate (OS-HECTOR) 600 mg calcium (1,500 mg) Tab, Take 600 mg by mouth once., Disp: , Rfl:     calcium-vitamin D tablet 600 mg-200 units, 60 tablets., Disp: , Rfl:     cetirizine (ZYRTEC) 10 MG tablet, Take 10 mg by mouth as needed., Disp: , Rfl:     docusate sodium (COLACE) 100 MG capsule, Take 1 capsule (100 mg total) by mouth 2 (two) times daily., Disp: 60 capsule, Rfl: 1    estradioL (ESTRACE) 0.01 % (0.1 mg/gram) vaginal cream, Place 1 g vaginally 3 (three) times a week., Disp: 42 g, Rfl: 3    [START ON 7/30/2025] estradioL (ESTRACE) 0.01 % (0.1 mg/gram) vaginal cream, Place 1 g vaginally 3 (three) times a week., Disp: 42 g, Rfl: 3    [START ON 7/31/2025] estradioL (VIVELLE-DOT) 0.1 mg/24 hr PTSW, Place 1 patch onto the skin twice a week., Disp: 24 patch, Rfl: 1    fluticasone propionate (FLONASE) 50 mcg/actuation nasal spray, 2 sprays by Each Nostril route., Disp: , Rfl:     ibuprofen (ADVIL,MOTRIN) 600 MG tablet, Take 1 tablet (600 mg total) by mouth every 6 (six) hours. Alternate between ibuprofen and tylenol every 3 hours. For example: @0800: ibuprofen 600mg @1100: tylenol 650mg @1400: ibuprofen 600mg @1700: tylenol 650 mg @2000: ibuprofen 600mg, Disp: 60 tablet, Rfl: 1    metoprolol tartrate (LOPRESSOR) 25 MG tablet, Take 1 tablet by mouth 2 (two) times daily., Disp: , Rfl:     MULTIVITAMIN ORAL, Take by mouth., Disp: , Rfl:

## 2025-07-29 NOTE — TELEPHONE ENCOUNTER
Called [facility] back in reference to message. Informed that yes Dr Brewster wants patient to get both Estradiol cream and patches scripts.

## 2025-07-29 NOTE — TELEPHONE ENCOUNTER
----- Message from Angely sent at 7/29/2025 11:42 AM CDT -----  Bethany calling form [facility] calling about rx Estradial patch and cream need a clarification on directions she can be reached at [phone]

## (undated) DEVICE — SOL POVIDONE PREP IODINE 4 OZ

## (undated) DEVICE — PORT ACCESS 8MM W/120MM LOW

## (undated) DEVICE — IRRIGATOR ENDOSCOPY DISP.

## (undated) DEVICE — DEVICE MYOSURE REACH

## (undated) DEVICE — SYR 50ML CATH TIP

## (undated) DEVICE — SOL ELECTROLUBE ANTI-STIC

## (undated) DEVICE — ELECTRODE REM PLYHSV RETURN 9

## (undated) DEVICE — PACK FLUENT DISPOSABLE

## (undated) DEVICE — SOL POVIDONE SCRUB IODINE 4 OZ

## (undated) DEVICE — SUT MCRYL PLUS 4-0 PS2 27IN

## (undated) DEVICE — SOUND DISPOSABLE UTERINE W/NOT

## (undated) DEVICE — INSERT CUSHIONPRONE VIEW LARGE

## (undated) DEVICE — SYS SEE SHARP SCP ANTIFG LNG

## (undated) DEVICE — ADHESIVE DERMABOND ADVANCED

## (undated) DEVICE — KIT WING PAD POSITIONING

## (undated) DEVICE — DEVICE SNAPSECURE FOL CATH

## (undated) DEVICE — SET BASIN 48X48IN 6000ML RING

## (undated) DEVICE — SOL IRRI STRL WATER 1000ML

## (undated) DEVICE — JELLY SURGILUBE 5GR

## (undated) DEVICE — GLOVE BIOGEL SKINSENSE PI 6.5

## (undated) DEVICE — SOL NACL IRR 3000ML

## (undated) DEVICE — SET TRI-LUMEN FILTERED TUBE

## (undated) DEVICE — TRAY DO THE ROBOT

## (undated) DEVICE — GLOVE SENSICARE PI SURG 6.5

## (undated) DEVICE — DRAPE COLUMN DAVINCI XI

## (undated) DEVICE — MANIPULATOR VCARE PLUS 34MM

## (undated) DEVICE — SYR 10CC LUER LOCK

## (undated) DEVICE — SEAL CANN UNIVERSAL 5-12MM

## (undated) DEVICE — SOL NORMAL USPCA 0.9%

## (undated) DEVICE — OBTURATOR BLADELESS 8MM XI CLR

## (undated) DEVICE — SUT V-LOC 180 GRN GS-21 12IN

## (undated) DEVICE — Device

## (undated) DEVICE — SUT PROLENE 0 CT1 30IN BLUE

## (undated) DEVICE — SEAL LENS SCOPE MYOSURE

## (undated) DEVICE — UNDERGLOVE BIOGEL PI SZ 6.5 LF

## (undated) DEVICE — SOL IRR SOD CHL .9% POUR

## (undated) DEVICE — NDL INSUFFLATION VERRES 120MM

## (undated) DEVICE — COVER TIP CURVED SCISSORS XI

## (undated) DEVICE — DRAPE ARM DAVINCI XI